# Patient Record
Sex: FEMALE | Race: WHITE | NOT HISPANIC OR LATINO | Employment: UNEMPLOYED | URBAN - METROPOLITAN AREA
[De-identification: names, ages, dates, MRNs, and addresses within clinical notes are randomized per-mention and may not be internally consistent; named-entity substitution may affect disease eponyms.]

---

## 2017-01-23 ENCOUNTER — HOSPITAL ENCOUNTER (EMERGENCY)
Facility: HOSPITAL | Age: 27
Discharge: HOME | End: 2017-01-23
Attending: EMERGENCY MEDICINE
Payer: MEDICAID

## 2017-01-23 VITALS
SYSTOLIC BLOOD PRESSURE: 134 MMHG | DIASTOLIC BLOOD PRESSURE: 80 MMHG | BODY MASS INDEX: 34.55 KG/M2 | TEMPERATURE: 98 F | HEIGHT: 66 IN | OXYGEN SATURATION: 97 % | WEIGHT: 215 LBS | RESPIRATION RATE: 20 BRPM | HEART RATE: 95 BPM

## 2017-01-23 DIAGNOSIS — K63.89 EPIPLOIC APPENDAGITIS: Primary | ICD-10-CM

## 2017-01-23 LAB
ALBUMIN SERPL BCP-MCNC: 3.7 G/DL
ALP SERPL-CCNC: 63 U/L
ALT SERPL W/O P-5'-P-CCNC: 35 U/L
ANION GAP SERPL CALC-SCNC: 9 MMOL/L
AST SERPL-CCNC: 20 U/L
B-HCG UR QL: NEGATIVE
BASOPHILS # BLD AUTO: 0 K/UL
BASOPHILS NFR BLD: 0.5 %
BILIRUB SERPL-MCNC: 0.6 MG/DL
BILIRUB UR QL STRIP: NEGATIVE
BUN SERPL-MCNC: 9 MG/DL
CALCIUM SERPL-MCNC: 9.1 MG/DL
CHLORIDE SERPL-SCNC: 109 MMOL/L
CLARITY UR: CLEAR
CO2 SERPL-SCNC: 20 MMOL/L
COLOR UR: YELLOW
CREAT SERPL-MCNC: 0.7 MG/DL
CTP QC/QA: YES
DIFFERENTIAL METHOD: NORMAL
EOSINOPHIL # BLD AUTO: 0 K/UL
EOSINOPHIL NFR BLD: 0.6 %
ERYTHROCYTE [DISTWIDTH] IN BLOOD BY AUTOMATED COUNT: 13.1 %
EST. GFR  (AFRICAN AMERICAN): >60 ML/MIN/1.73 M^2
EST. GFR  (NON AFRICAN AMERICAN): >60 ML/MIN/1.73 M^2
GLUCOSE SERPL-MCNC: 91 MG/DL
GLUCOSE UR QL STRIP: NEGATIVE
HCT VFR BLD AUTO: 38.6 %
HGB BLD-MCNC: 13.1 G/DL
HGB UR QL STRIP: NEGATIVE
KETONES UR QL STRIP: NEGATIVE
LEUKOCYTE ESTERASE UR QL STRIP: NEGATIVE
LIPASE SERPL-CCNC: 17 U/L
LYMPHOCYTES # BLD AUTO: 2.6 K/UL
LYMPHOCYTES NFR BLD: 33.9 %
MCH RBC QN AUTO: 27.9 PG
MCHC RBC AUTO-ENTMCNC: 33.8 %
MCV RBC AUTO: 83 FL
MONOCYTES # BLD AUTO: 0.5 K/UL
MONOCYTES NFR BLD: 6.2 %
NEUTROPHILS # BLD AUTO: 4.5 K/UL
NEUTROPHILS NFR BLD: 58.8 %
NITRITE UR QL STRIP: NEGATIVE
PH UR STRIP: 6 [PH] (ref 5–8)
PLATELET # BLD AUTO: 314 K/UL
PMV BLD AUTO: 9.3 FL
POTASSIUM SERPL-SCNC: 3.6 MMOL/L
PROT SERPL-MCNC: 6.6 G/DL
PROT UR QL STRIP: NEGATIVE
RBC # BLD AUTO: 4.68 M/UL
SODIUM SERPL-SCNC: 138 MMOL/L
SP GR UR STRIP: 1.02 (ref 1–1.03)
URN SPEC COLLECT METH UR: NORMAL
UROBILINOGEN UR STRIP-ACNC: NEGATIVE EU/DL
WBC # BLD AUTO: 7.6 K/UL

## 2017-01-23 PROCEDURE — 87086 URINE CULTURE/COLONY COUNT: CPT

## 2017-01-23 PROCEDURE — 25500020 PHARM REV CODE 255: Performed by: EMERGENCY MEDICINE

## 2017-01-23 PROCEDURE — 81003 URINALYSIS AUTO W/O SCOPE: CPT

## 2017-01-23 PROCEDURE — 83690 ASSAY OF LIPASE: CPT

## 2017-01-23 PROCEDURE — 25500020 PHARM REV CODE 255

## 2017-01-23 PROCEDURE — 81025 URINE PREGNANCY TEST: CPT | Performed by: EMERGENCY MEDICINE

## 2017-01-23 PROCEDURE — 36415 COLL VENOUS BLD VENIPUNCTURE: CPT

## 2017-01-23 PROCEDURE — 80053 COMPREHEN METABOLIC PANEL: CPT

## 2017-01-23 PROCEDURE — 85025 COMPLETE CBC W/AUTO DIFF WBC: CPT

## 2017-01-23 PROCEDURE — 99284 EMERGENCY DEPT VISIT MOD MDM: CPT | Mod: 25

## 2017-01-23 RX ORDER — NAPROXEN 500 MG/1
500 TABLET ORAL 2 TIMES DAILY WITH MEALS
Qty: 30 TABLET | Refills: 0 | Status: SHIPPED | OUTPATIENT
Start: 2017-01-23 | End: 2020-04-14

## 2017-01-23 RX ADMIN — IOHEXOL 100 ML: 350 INJECTION, SOLUTION INTRAVENOUS at 12:01

## 2017-01-23 RX ADMIN — IOHEXOL 30 ML: 350 INJECTION, SOLUTION INTRAVENOUS at 02:01

## 2017-01-23 NOTE — ED NOTES
Upon discharge, patient is AAOx4, no cardiac or respiratory complications. Follow up care and  Medications have been reviewed with patient and has been instructed to return to the ER if needed. Patient verbalized understanding and ambulated to the lobby without difficulty. MARY MITCHELL.

## 2017-01-23 NOTE — ED PROVIDER NOTES
Encounter Date: 2017    SCRIBE #1 NOTE: I, Odette Washington, am scribing for, and in the presence of, Dr. Boateng.       History     Chief Complaint   Patient presents with    Abdominal Pain     left lower abd. pain x 3 days     Review of patient's allergies indicates:   Allergen Reactions    Bactrim [sulfamethoxazole-trimethoprim]     Ciprofloxacin     Penicillins      HPI Comments:   2017 9:47 AM     Chief Complaint: Abdominal pain      The patient is a 26 y.o. female with anxiety who presents to the ED with an acute onset of constant LLQ abdominal pain. The symptoms began 2 days ago. The pain is described as a dull pain and is exacerbated with movement. She also endorses a non-productive cough. The patient is unsure if she has had prior similar symptoms to ovarian cysts in the past. She has a SHx including an appendectomy and a  section. The patient denies smoking tobacco, drinking alcohol, fever, SOB, swelling, chest pain, nausea, vomiting, diarrhea, constipation, dysuria, vaginal discharge, weakness, or any other symptoms at this time. Penicillin, Bactrim and Wellbutrin drug allergies noted.    The history is provided by the patient.     Past Medical History   Diagnosis Date    Anxiety     MRSA pneumonia      No past medical history pertinent negatives.  Past Surgical History   Procedure Laterality Date     section, low transverse      Appendectomy      Tonsillectomy      Bronchoscopy       Family History   Problem Relation Age of Onset    COPD Mother     Heart disease Brother      Social History   Substance Use Topics    Smoking status: Never Smoker    Smokeless tobacco: None    Alcohol use Yes      Comment: socially     Review of Systems   Constitutional: Negative for activity change, appetite change, chills, diaphoresis, fatigue and fever.   HENT: Negative for congestion, rhinorrhea, sore throat, trouble swallowing and voice change.    Respiratory: Positive for cough.  Negative for choking, chest tightness, shortness of breath, wheezing and stridor.    Cardiovascular: Negative for chest pain, palpitations and leg swelling.   Gastrointestinal: Positive for abdominal pain (LLQ). Negative for abdominal distention, blood in stool, constipation, diarrhea, nausea and vomiting.   Genitourinary: Negative for difficulty urinating, dysuria, flank pain, frequency and vaginal discharge.   Musculoskeletal: Negative for arthralgias, back pain, joint swelling, myalgias and neck pain.   Skin: Negative for color change and rash.   Neurological: Negative for dizziness, syncope, speech difficulty, weakness, numbness and headaches.   Hematological: Negative for adenopathy. Does not bruise/bleed easily.   All other systems reviewed and are negative.    Physical Exam   Initial Vitals   BP Pulse Resp Temp SpO2   01/23/17 0912 01/23/17 0912 01/23/17 0912 01/23/17 0912 01/23/17 0912   134/80 95 20 98.4 °F (36.9 °C) 97 %     Physical Exam    Nursing note and vitals reviewed.  Constitutional: She appears well-developed and well-nourished. She is not diaphoretic. No distress.   HENT:   Head: Normocephalic and atraumatic.   Mouth/Throat: Oropharynx is clear and moist.   Eyes: EOM are normal. Pupils are equal, round, and reactive to light.   Pupils are 3 mm round and reactive to light.   Neck: Neck supple.   Cardiovascular: Normal rate, regular rhythm and normal heart sounds. Exam reveals no gallop and no friction rub.    No murmur heard.  Pulses:       Radial pulses are 2+ on the right side, and 2+ on the left side.        Posterior tibial pulses are 2+ on the right side, and 2+ on the left side.   Capillary refill less than 2 seconds.    Pulmonary/Chest: Breath sounds normal. She has no wheezes. She has no rhonchi. She has no rales.   Abdominal: Soft. Bowel sounds are normal. She exhibits no distension. There is tenderness (to palpation) in the left lower quadrant. There is CVA tenderness (mild). There is no  rebound and no guarding.   Active bowel sounds. No palpable organomegaly.    Musculoskeletal: She exhibits no edema or tenderness.   No step-off, deformity, or bony tenderness of the spine. LE's full ROM.    Lymphadenopathy:     She has no cervical adenopathy.   Neurological: She is alert and oriented to person, place, and time. She has normal strength. No cranial nerve deficit or sensory deficit.   Negative straight leg raise, bilaterally.    Skin: Skin is warm and dry.   Psychiatric: She has a normal mood and affect. Her behavior is normal. Judgment and thought content normal.       ED Course   Procedures  Labs Reviewed   COMPREHENSIVE METABOLIC PANEL - Abnormal; Notable for the following:        Result Value    CO2 20 (*)     All other components within normal limits   CULTURE, URINE   CBC W/ AUTO DIFFERENTIAL   LIPASE   URINALYSIS   POCT URINE PREGNANCY     Imaging Results         CT Abdomen Pelvis With Contrast (Final result) Result time:  01/23/17 14:23:49    Final result by Lavonne Delarosa MD (01/23/17 14:23:49)    Impression:      1.  Acute, epiploic appendagitis in the left lower quadrant adjacent to the distal descending colon.    2.  Diffuse hepatic steatosis.      Electronically signed by: Lavonne Delarosa MD  Date:     01/23/17  Time:    14:23     Narrative:      Comparison:12/07/14    Technique: Axial 5-mm images are reviewed from above the diaphragm to the proximal femora following the administration of intravenous (100 ml Omnipaque 350) and oral contrast material.  Coronal and sagittal reformatted images are reviewed.    Findings:The lung bases are clear.    Diffuse hepatic steatosis is identified.  There is peripheral fatty sparing.  No acute gallbladder abnormality is seen.  The spleen is unremarkable.  The adrenal glands and pancreas are unremarkable.  No biliary dilatation is seen.    The kidneys enhance symmetrically.  No hydroureteronephrosis or nephroureterolithiasis is  identified.    The stomach is mildly well distended.  No dilated loops of small bowel are seen.  No signs of acute appendicitis noted.  The appendix is not clearly visualized and may be absent.  Positive contrast reaches the colon.  There is an inflamed epiploic appendage within the left lower quadrant on axial image 56, adjacent to the distal descending colon.    The uterus and adnexa are unremarkable for age.  There is a small follicle in the right ovary.  The urinary bladder is mildly well distended and otherwise unremarkable.    No abdominal or pelvic lymphadenopathy is seen.  The aorta is non-aneurysmal.  The principal vascular structures of the abdomen and pelvis appear patent.    No acute osseous abnormality is seen.            US Pelvis Comp with Transvag NON-OB (xpd (Final result) Result time:  01/23/17 11:02:29    Final result by Trey Owens MD (01/23/17 11:02:29)    Impression:     Unremarkable pelvic ultrasound.      Electronically signed by: Trey Owens MD  Date:     01/23/17  Time:    11:02     Narrative:    Sonographic evaluation of the pelvis was performed transabdominally and transvaginally    Comparison: 12/07/2014    Findings: The uterus measures 9.1 x 4.6 x 5.7 cm.  The individual stripe is normal in thickness at 2 mm.  A small nabothian cyst of the cervix is noted.    There is a normal in size and appearance with normal flow present.  There is no adnexal mass.    There is no free fluid.               X-Rays:   Independently Interpreted Readings:   Abdomen:   Abdomen and Pelvis CT with Contrast - No masses.  Normal vessels. Patient has findings consistent with epiploic appendagitis in the left lower quadrant consistant with her pain     Medical Decision Making:   History:   Old Medical Records: I decided to obtain old medical records.  Initial Assessment:   This is an emergent evaluation of an 26 y.o. presenting with LLQ abdominal pain.   Differential Diagnosis:   My differential  diagnosis includes, but not limited to, ovarian cysts, UTI, pyelonephritis, and kidney stone.   Independently Interpreted Test(s):   I have ordered and independently interpreted X-rays - see prior notes.  Clinical Tests:   Lab Tests: Reviewed and Ordered  Radiological Study: Reviewed and Ordered            Scribe Attestation:   Scribe #1: I performed the above scribed service and the documentation accurately describes the services I performed. I attest to the accuracy of the note.    Attending Attestation:           Physician Attestation for Scribe:  Physician Attestation Statement for Scribe #1: I, Dr. Boateng, reviewed documentation, as scribed by Odette Washington in my presence, and it is both accurate and complete.         Attending ED Notes:   Patient labs show no acute abnormality, patient CT shows epiploic appendicitis Echo she will be referred to surgery for elective management and will be started on Naprosyn for pain she is cautioned to return immediately to the ER for any worsening or any further concerns          ED Course     Clinical Impression:     1. Epiploic appendagitis        Disposition:   Disposition: Discharged  Condition: Stable       Blair Boateng MD  01/23/17 9795

## 2017-01-23 NOTE — ED AVS SNAPSHOT
[facility] [address] [place] [address]               Oneida Dela Cruz   2017  9:15 AM   ED    Description:  Female : 1990   Department:  [facility]           Your Care was Coordinated By:     Provider Role From To    Blair Boateng MD Attending Provider 17 0942 --      Reason for Visit     Abdominal Pain           Diagnoses this Visit        Comments    Epiploic appendagitis    -  Primary       ED Disposition     ED Disposition Condition Comment    Discharge             To Do List           Follow-up Information     Follow up with [facility].    Specialty:  Emergency Medicine    Why:  If symptoms worsen    Contact information:    [address] [place] [id number] [phone]        Follow up with Trey Rodrigues MD.    Specialties:  General Surgery, Surgery    Contact information:     [facility]  [address]  [facility] [address]  [phone]         These Medications        Disp Refills Start End    naproxen (NAPROSYN) 500 MG tablet 30 tablet 0 2017     Take 1 tablet (500 mg total) by mouth 2 (two) times daily with meals. - Oral      [facility]     [facility] On Call Nurse Care Line -  Assistance  Registered nurses in the [facility] On Call Center provide clinical advisement, health education, appointment booking, and other advisory services.  Call for this free service at [phone].             Medications           Message regarding Medications     Verify the changes and/or additions to your medication regime listed below are the same as discussed with your clinician today.  If any of these changes or additions are incorrect, please notify your healthcare provider.        START taking these NEW medications        Refills    naproxen (NAPROSYN) 500 MG tablet 0    Sig: Take 1 tablet (500 mg total) by mouth 2 (two) times daily with meals.    Class: Print    Route: Oral     "  These medications were administered today        Dose Freq    omnipaque 350 iohexol 350 mg iodine/mL      Notes to Pharmacy: Created by cabinet override    omnipaque 350 iohexol 30 mL 30 mL IMG once as needed    Sig: Take 30 mLs by mouth ONCE PRN for contrast.    Class: Normal    Route: Oral      STOP taking these medications     tramadol (ULTRAM) 50 mg tablet Take 1 tablet (50 mg total) by mouth every 6 (six) hours as needed.    ibuprofen (ADVIL,MOTRIN) 800 MG tablet Take 1 tablet (800 mg total) by mouth 3 (three) times daily as needed.    docusate sodium (COLACE) 100 MG capsule Take 1 capsule (100 mg total) by mouth 2 (two) times daily as needed for Constipation.           Verify that the below list of medications is an accurate representation of the medications you are currently taking.  If none reported, the list may be blank. If incorrect, please contact your healthcare provider. Carry this list with you in case of emergency.           Current Medications     cetirizine (ZYRTEC) 10 MG tablet Take 10 mg by mouth once daily.    naproxen (NAPROSYN) 500 MG tablet Take 1 tablet (500 mg total) by mouth 2 (two) times daily with meals.    sertraline (ZOLOFT) 100 MG tablet Take 100 mg by mouth once daily.           Clinical Reference Information           Your Vitals Were     BP Pulse Temp Resp Height Weight    134/80 95 98.4 °F (36.9 °C) (Oral) 20 5' 6" (1.676 m) 97.5 kg (215 lb)    Last Period SpO2 BMI          01/13/2017 97% 34.7 kg/m2        Allergies as of 1/23/2017        Reactions    Bactrim [Sulfamethoxazole-trimethoprim]     Ciprofloxacin     Penicillins       Immunizations Administered on Date of Encounter - 1/23/2017     None      ED Micro, Lab, POCT     Start Ordered       Status Ordering Provider    01/23/17 0959 01/23/17 0958  POCT urine pregnancy  Once      Final result     01/23/17 0959 01/23/17 0958  Urine culture **CANNOT BE ORDERED STAT**  Once      In process     01/23/17 0955 01/23/17 0954  CBC W/ " AUTO DIFFERENTIAL  Once      Final result     01/23/17 0955 01/23/17 0954  Comp. Metabolic Panel  STAT      Final result     01/23/17 0955 01/23/17 0954  Lipase  STAT      Final result     01/23/17 0955 01/23/17 0954  Urinalysis - Clean Catch  STAT      Final result       ED Imaging Orders     Start Ordered       Status Ordering Provider    01/23/17 1128 01/23/17 1128  CT Abdomen Pelvis With Contrast  1 time imaging      Final result     01/23/17 0957 01/23/17 0957  US Pelvis Comp with Transvag NON-OB (xpd  1 time imaging      Final result       Discharge References/Attachments     COLON, HOW IT WORKS (ENGLISH)    ABDOMINAL PAIN, ADULT (ENGLISH)    HERNIA (ADULT) (ENGLISH)      [facility] Sign-Up     Activating your [facility] account is as easy as 1-2-3!     1) Visit [email or web address], select Sign Up Now, enter this activation code and your date of birth, then select Next.  [email or web address]  Expires: 3/9/2017  2:55 PM      2) Create a username and password to use when you visit [facility] in the future and select a security question in case you lose your password and select Next.    3) Enter your e-mail address and click Sign Up!    Additional Information  If you have questions, please e-mail [email or web address] or call [phone] to talk to our [facility] staff. Remember, [facility] is NOT to be used for urgent needs. For medical emergencies, dial 911.          [facility] complies with applicable Federal civil rights laws and does not discriminate on the basis of race, color, national origin, age, disability, or sex.        Language Assistance Services     ATTENTION: Language assistance services are available, free of charge. Please call [phone].      ATENCIÓN: Si habla español, tiene a hylton disposición servicios gratuitos de asistencia lingüística. Llame al [phone].     CHÚ Ý: N?u b?n nói Ti?ng Vi?t, có các d?ch v? h? tr? ngôn ng? mi?n phí dành cho b?n. G?i s? [phone].

## 2017-01-24 LAB — BACTERIA UR CULT: NO GROWTH

## 2017-06-27 ENCOUNTER — OFFICE VISIT (OUTPATIENT)
Dept: ALLERGY | Facility: CLINIC | Age: 27
End: 2017-06-27
Payer: COMMERCIAL

## 2017-06-27 VITALS
DIASTOLIC BLOOD PRESSURE: 70 MMHG | WEIGHT: 212 LBS | SYSTOLIC BLOOD PRESSURE: 124 MMHG | BODY MASS INDEX: 34.07 KG/M2 | HEIGHT: 66 IN

## 2017-06-27 DIAGNOSIS — K21.9 LARYNGOPHARYNGEAL REFLUX (LPR): ICD-10-CM

## 2017-06-27 DIAGNOSIS — Z87.01 HISTORY OF PNEUMONIA: ICD-10-CM

## 2017-06-27 DIAGNOSIS — J31.0 CHRONIC RHINITIS: ICD-10-CM

## 2017-06-27 DIAGNOSIS — B99.9 RECURRENT INFECTIONS: Primary | ICD-10-CM

## 2017-06-27 PROCEDURE — 99244 OFF/OP CNSLTJ NEW/EST MOD 40: CPT | Mod: ,,, | Performed by: ALLERGY & IMMUNOLOGY

## 2017-06-27 RX ORDER — AZELASTINE 1 MG/ML
1 SPRAY, METERED NASAL 2 TIMES DAILY
Qty: 30 ML | Refills: 3 | Status: SHIPPED | OUTPATIENT
Start: 2017-06-27 | End: 2020-06-09

## 2017-06-27 RX ORDER — CLONAZEPAM 0.5 MG/1
0.5 TABLET ORAL
COMMUNITY
End: 2019-12-19 | Stop reason: SDUPTHER

## 2017-06-27 RX ORDER — TIZANIDINE 4 MG/1
4 TABLET ORAL
COMMUNITY
End: 2020-04-14

## 2017-06-27 RX ORDER — CEFDINIR 300 MG/1
300 CAPSULE ORAL 2 TIMES DAILY
COMMUNITY
End: 2017-09-01 | Stop reason: ALTCHOICE

## 2017-06-27 NOTE — PATIENT INSTRUCTIONS
Lifestyle Changes for Controlling GERD    When you have GERD, stomach acid feels as if its backing up toward your mouth. Whether or not you take medicine to control your GERD, your symptoms can often be improved with lifestyle changes. Talk to your healthcare provider about the following suggestions. These suggestions may help you get relief from your symptoms.  Raise your head  Reflux is more likely to strike when youre lying down flat, because stomach fluid can flow backward more easily. Raising the head of your bed 4 to 6 inches can help. To do this:  · Slide blocks or books under the legs at the head of your bed. Or, place a wedge under the mattress. Many [employer] can make a suitable wedge for you. The wedge should run from your waist to the top of your head.  · Dont just prop your head on several pillows. This increases pressure on your stomach. It can make GERD worse.  Watch your eating habits  Certain foods may increase the acid in your stomach or relax the lower esophageal sphincter. This makes GERD more likely. Its best to avoid the following if they cause you symptoms:  · Coffee, tea, and carbonated drinks (with and without caffeine)  · Fatty, fried, or spicy food  · Mint, chocolate, onions, and tomatoes  · Peppermint  · Any other foods that seem to irritate your stomach or cause you pain  Relieve the pressure  Tips include the following:  · Eat smaller meals, even if you have to eat more often.  · Dont lie down right after you eat. Wait a few hours for your stomach to empty.  · Avoid tight belts and tight-fitting clothes.  · Lose excess weight.  Tobacco and alcohol  Avoid smoking tobacco and drinking alcohol. They can make GERD symptoms worse.  Date Last Reviewed: 7/1/2016  © 1527-5912 [employer]. [address] [place] [address], [place] [address]. All rights reserved. This information is not intended as a substitute for professional medical care. Always follow your healthcare  professional's instructions.

## 2017-06-27 NOTE — PROGRESS NOTES
"Subjective:       Patient ID: Oneida Dela Cruz is a 27 y.o. female.    Chief Complaint: Allergic Rhinitis  (recurrent sinus infections) and Pneumonia (twice in the last 3 years, recurrent bronchitis)    HPI     Pt presents as a consult from Peace Gray NP.   Pt states since she was 19 yrs had recurrent infections.   SInus infections about 6 infections per year.   Abx: 2-3 or 3-4 days will help.   Steroid shots will help.   Sinus infections: headaches, pressure, green mucus , increased congestion, ear pain, lymph node swelling in posterior chain. No fever.   Pneumonia- 3 years ago MRSA and intubated. She was hospitalized for a while.   Ear infections: usually at the same time as sinustis.   No recurrent skin staph infections.   Sinus ct: none   No family history of PID.   Never had an immune eval prior.     She will stay sick longer than other contacts.     Pt has a baseline of chronic rhinitis. She takes a zyrtec daily. "If I don't take zyrtec daily I will get sick."     Review of Systems    General: neg unexpected weight changes, fevers, chills, night sweats, malaise  HEENT: see hpi, Neg eye pain, vision changes, ear drainage, nose bleeds, throat tightness, sores in the mouth  CV: Neg chest pain, palpitations, swelling  Resp: see hpi, neg shortness of breath, hemoptysis, cough  GI: see hpi, neg dysphagia, night abdominal pain, reflux, chronic diarrhea, chronic constipation  Derm: See Hpi, neg new rash, neg flushing  Mu/sk: Neg joint pain, joint swelling   Psych: Neg anxiety  neuro: neg chronic headaches, muscle weakness  Endo: neg heat/cold intolerance, chronic fatigue      Objective:       Vitals:    06/27/17 1039   BP: 124/70   Weight: 96.2 kg (212 lb)   Height: 5' 5.5" (1.664 m)       Physical Exam      General: no acute distress, well developed well nourished   HEENT:   Head:normocephalic atraumatic  Eyes: DARÍO, EOMI, Neg injection, scleral icterus, or conjunctival papillary hypertrophy.  Ears: tm clear " bilaterally, normal canal  Nose:2-3 + inferior turbinates pink, neg nasal polyps            Mucosa: dry             Septal irritation: distal bilaterally  OP: mucus membranes moist, - cobblestoning, - PND, neg erythema or lesions  Neck: supple, Full range of motion, neg lymphadenopathy  Chest: full respiratory excursion no abnormal chest abnormality  Resp: clear to ascultation bilaterally  CV: RRR, neg MRG, brisk capillary refill  Abdomen: BS+, non tender, non distended, neg hepatosplenomegaly.   Ext:  Neg clubbing, cyanosis, pitting edema  Skin: Neg rashes or lesions  Lymph: neg supraclavicular, axillary     Assessment:     Recurrent bacterial infection  - start immune eval  - quant ig titers and strep pneumo- pt getting done at [facility].     Chronic rhinitis  - start rhinitis action plan  1. rhinitis action plan given with INS technique shown today   2. start fluticasone 2 SEN BID x 2 weeks then titrate to lowest effective dose   3. start azelastine 1 SEN BID   4. start sinus rinse- instruction on use given   5. start sinus buster   6. skin prick testing- skin testing deferred today   7. F/u in 4-6 weeks    LPR- likely contributing to rhinitis symptoms.   Information given and reviewed

## 2017-06-28 NOTE — PROGRESS NOTES
I will have to look up her chart in [facility] to get the full lab scope. Cody doesn't report fully back to [facility] unfortunately....

## 2017-07-06 NOTE — PROGRESS NOTES
Will you call her and tell her that her strep pneumo protection is very low. She needs to get Dr. Arias's office to give her the pneumovax-23 vaccine and repeat titers 4-6 weeks later to see if she responded.

## 2017-08-08 DIAGNOSIS — A49.9 RECURRENT BACTERIAL INFECTION: Primary | ICD-10-CM

## 2017-09-01 ENCOUNTER — OFFICE VISIT (OUTPATIENT)
Dept: ALLERGY | Facility: CLINIC | Age: 27
End: 2017-09-01
Payer: MEDICAID

## 2017-09-01 VITALS
WEIGHT: 215.5 LBS | BODY MASS INDEX: 35.9 KG/M2 | HEIGHT: 65 IN | DIASTOLIC BLOOD PRESSURE: 80 MMHG | SYSTOLIC BLOOD PRESSURE: 118 MMHG

## 2017-09-01 DIAGNOSIS — K21.9 LARYNGOPHARYNGEAL REFLUX (LPR): ICD-10-CM

## 2017-09-01 DIAGNOSIS — J31.0 OTHER CHRONIC RHINITIS: ICD-10-CM

## 2017-09-01 DIAGNOSIS — Z87.01 HISTORY OF PNEUMONIA: ICD-10-CM

## 2017-09-01 DIAGNOSIS — B99.9 RECURRENT INFECTIONS: Primary | ICD-10-CM

## 2017-09-01 PROCEDURE — 99214 OFFICE O/P EST MOD 30 MIN: CPT | Mod: ,,, | Performed by: ALLERGY & IMMUNOLOGY

## 2017-09-01 PROCEDURE — 3008F BODY MASS INDEX DOCD: CPT | Mod: ,,, | Performed by: ALLERGY & IMMUNOLOGY

## 2017-09-01 RX ORDER — FLUTICASONE PROPIONATE 50 MCG
1 SPRAY, SUSPENSION (ML) NASAL DAILY
COMMUNITY
End: 2023-01-11

## 2017-09-01 NOTE — PROGRESS NOTES
"Subjective:       Patient ID: Oneida Dela Cruz is a 27 y.o. female.    Chief Complaint: Allergic Rhinitis  (no better since last visit, labs not done)    HPI     Pt presents for sinusitis management.  She has been ill twice since her last visit.   The first illness lasted for about a week. She felt that she was able to fight it "off."  She used elder berry and emergency C.   The second illness was about 2 weeks ago today. She did take cefdinir.   Her main symptoms- cough, congestion, malaise, fatigue, denies any fevers.   Currently: her symptoms are Rhinorrhea, and cough  Tx: flonase 2 SEN BID and azelastine 2 SEN BID, saline- BID- sinus buster helped for a few mins but then it seemed that her nose was congested afterwards.     Reflux: omeprazole- 20 mg- takes before breakfast. Doesn't fast 30 mins.     Her immune eval showed:    Quantitative IG  Normal     IgG- 984  IgM- 148  IgA- 220  Normal subclasses    Strep Pneumo  4/14 protective    Pneumovax at Dr. Arias's office. July 2017  Post strep titers: not done yet  Pt is willing to do today at [facility].     Review of Systems      General: neg unexpected weight changes, fevers, chills, night sweats, malaise  HEENT: see hpi, Neg eye pain, vision changes, ear drainage, nose bleeds, throat tightness, sores in the mouth  CV: Neg chest pain, palpitations, swelling  Resp: see hpi, neg shortness of breath, hemoptysis  GI: see hpi, neg dysphagia, night abdominal pain, chronic diarrhea, chronic constipation  Derm: See Hpi, neg new rash, neg flushing  Mu/sk: Neg joint pain, joint swelling   Psych: Neg anxiety  neuro: neg chronic headaches, muscle weakness  Endo: neg heat/cold intolerance, chronic fatigue      Objective:       Vitals:    09/01/17 1045   BP: 118/80   Weight: 97.8 kg (215 lb 8 oz)   Height: 5' 5" (1.651 m)       Physical Exam      General: no acute distress, well developed well nourished   HEENT:   Head:normocephalic atraumatic  Eyes: DARÍO, EOMI, Neg injection, " scleral icterus, or conjunctival papillary hypertrophy.  Ears: tm clear bilaterally, normal canal  Nose: 3+ inferior turbinates pink, neg nasal polyps            Mucosa: dry and red             Septal irritation: none   OP: mucus membranes moist, - cobblestoning, - PND, neg erythema or lesions  Neck: supple, Full range of motion, neg lymphadenopathy  Chest: full respiratory excursion no abnormal chest abnormality  Resp: clear to ascultation bilaterally  CV: RRR, neg MRG, brisk capillary refill  Abdomen: BS+, non tender, non distended.   Ext:  Neg clubbing, cyanosis, pitting edema  Skin: Neg rashes or lesions  Lymph: neg supraclavicular, axillary       Assessment:       1. Recurrent infections    2. History of pneumonia    3. Other chronic rhinitis    4. Laryngopharyngeal reflux (LPR)        Plan:       Recurrent infections  -     CT Maxillofacial Without Contrast  -     Humoral Immune Eval (Pneumo 14) with H. flu; Future; Expected date: 09/01/2017        -     currently normal quantitative titers, eval vaccine response today        -      In 6 months, repeat titer to evaluate for memory.      History of pneumonia  - continue immune eval  - consider amox prophylaxis.     Other chronic rhinitis  - continue fluticasone, azelastine, saline, sinus buster per rhinitis action plan.   - discussed using afrin and the proper use of it when she goes to [place] in December.   - CT sinus to evaluate for obstruction and need for surgical management.     Laryngopharyngeal reflux (LPR)  - continue omeprazole.   - may possibly be contributing to congestion and pnd.   - fast 30 mins prior to omeprazole ingestion.      Follow up in 3 months.     Allie Hicks M.D.  Allergy/Immunology  [facility]   [phone] phone  [phone] fax

## 2017-09-01 NOTE — LETTER
September 1, 2017        Cliff Arias MD  [address]  [address]  [facility] [address]             [facility]  [address] [place]  [address]  [place] [address]  Phone: [phone]  Fax: [phone]   Patient: Oneida Dela Cruz   MR Number: [id number]   YOB: 1990   Date of Visit: 9/1/2017       Dear Dr. Arias:    Thank you for referring Oneida Dela Cruz to me for evaluation. Attached you will find relevant portions of my assessment and plan of care.    If you have questions, please do not hesitate to call me. I look forward to following Oneida Dela Cruz along with you.    Sincerely,      Allie Hicks MD            CC  No Recipients    Enclosure

## 2017-09-07 ENCOUNTER — TELEPHONE (OUTPATIENT)
Dept: ALLERGY | Facility: CLINIC | Age: 27
End: 2017-09-07

## 2017-09-07 NOTE — TELEPHONE ENCOUNTER
----- Message from Allie Hicks MD sent at 9/7/2017  3:56 PM CDT -----  Will you tell her that her strep pneumo levels look fabulous?

## 2017-09-12 ENCOUNTER — TELEPHONE (OUTPATIENT)
Dept: ALLERGY | Facility: CLINIC | Age: 27
End: 2017-09-12

## 2017-09-12 NOTE — TELEPHONE ENCOUNTER
----- Message from Allie Hicks MD sent at 9/12/2017  9:05 AM CDT -----  Will you call and tell her that she has mild sinus disease however she may have some polyp vs other lesions in her right maxillary sinus. This may not account for the totality of her symptoms but may contribute towards any right sided fullness in the face. To me, It doesn't look as though antibiotics are needed at this time. For the polyp type lesions, ENT can better comment on whether or not surgical intervention is required.

## 2019-06-13 ENCOUNTER — HOSPITAL ENCOUNTER (EMERGENCY)
Facility: HOSPITAL | Age: 29
Discharge: HOME | End: 2019-06-13
Attending: EMERGENCY MEDICINE
Payer: COMMERCIAL

## 2019-06-13 VITALS
WEIGHT: 215.63 LBS | OXYGEN SATURATION: 97 % | TEMPERATURE: 98 F | HEART RATE: 96 BPM | SYSTOLIC BLOOD PRESSURE: 128 MMHG | HEIGHT: 65 IN | BODY MASS INDEX: 35.93 KG/M2 | DIASTOLIC BLOOD PRESSURE: 79 MMHG | RESPIRATION RATE: 18 BRPM

## 2019-06-13 DIAGNOSIS — J06.9 VIRAL URI WITH COUGH: ICD-10-CM

## 2019-06-13 DIAGNOSIS — R05.9 COUGH: Primary | ICD-10-CM

## 2019-06-13 LAB
B-HCG UR QL: NEGATIVE
CTP QC/QA: YES

## 2019-06-13 PROCEDURE — 99283 EMERGENCY DEPT VISIT LOW MDM: CPT | Mod: 25

## 2019-06-13 PROCEDURE — 81025 URINE PREGNANCY TEST: CPT | Performed by: PHYSICIAN ASSISTANT

## 2019-06-13 RX ORDER — CHOLECALCIFEROL (VITAMIN D3) 25 MCG
3000 TABLET ORAL DAILY
COMMUNITY
End: 2020-06-09

## 2019-06-13 RX ORDER — DOXYCYCLINE HYCLATE 100 MG
100 TABLET ORAL 2 TIMES DAILY
COMMUNITY
End: 2019-12-17

## 2019-06-13 NOTE — ED NOTES
C/o moist cough with productive green sputum and fever x3 days also c/o emesis after coughing and states that she has been wheezing. Even non labored respirations aware to notify nurse of needs or concerns

## 2019-06-13 NOTE — ED PROVIDER NOTES
Encounter Date: 2019       History     Chief Complaint   Patient presents with    Cough       Time seen by provider: 10:33 AM on 2019    Oneida Dela Cruz is a 29 y.o. female with a PMHx of MRSA pneumonia who presents to the ED with the onset of a green productive cough that began 3 days PTA and has been persistent since onset. Associated sx include post tussive emesis, wheezing, and a fever. The pt states that her doctor prescribed her Doxycycline for the sx 3 days ago which has not helped relieve the sx. She also admits to taking Flonase, Albuterol, and Mucinex. She denies having any congestion, runny nose, being a smoker or any other sx at this time. Past surgical hx includes an appendectomy and a tonsillectomy. Pt has drug allergies to Bactrim, Ciprofloxacin, and Penicillins.    The history is provided by the patient.     Review of patient's allergies indicates:   Allergen Reactions    Bactrim [sulfamethoxazole-trimethoprim]      Rash     Ciprofloxacin      Doesn't remember ? Rash     Penicillins Rash     Tiny raised bumps. Not urticarial- 10 years.      Past Medical History:   Diagnosis Date    Anxiety     MRSA pneumonia      Past Surgical History:   Procedure Laterality Date    APPENDECTOMY      BRONCHOSCOPY      BRONCHOSCOPY N/A 2014    [facility]    BRONCHOSCOPY N/A 2014    [facility] Lambert [facility] at [facility]     SECTION, LOW TRANSVERSE      TONSILLECTOMY       Family History   Problem Relation Age of Onset    COPD Mother     Asthma Mother     Heart disease Mother     Heart disease Brother     Heart disease Maternal Grandfather     Diabetes Paternal Grandmother     Heart disease Paternal Grandmother     Immunodeficiency Neg Hx     Eczema Neg Hx     Rhinitis Neg Hx      Social History     Tobacco Use    Smoking status: Never Smoker    Smokeless tobacco: Never Used   Substance Use Topics    Alcohol use: Yes     Comment:  "socially    Drug use: No     Review of Systems   Constitutional: Positive for fever. Negative for activity change, appetite change and chills.   HENT: Negative for congestion, rhinorrhea and sore throat.    Eyes: Negative for redness and visual disturbance.   Respiratory: Positive for cough and wheezing. Negative for chest tightness and shortness of breath.         Positive for post tussive emesis.   Cardiovascular: Negative for chest pain.   Gastrointestinal: Negative for abdominal pain, diarrhea, nausea and vomiting.   Genitourinary: Negative for dysuria and frequency.   Musculoskeletal: Negative for back pain, neck pain and neck stiffness.   Skin: Negative for rash.   Neurological: Negative for dizziness, syncope, numbness and headaches.       Physical Exam     Vitals:    06/13/19 1012 06/13/19 1053 06/13/19 1115   BP: 137/83 128/79    BP Location: Right arm Left arm    Patient Position: Sitting Sitting    Pulse: 109 107 96   Resp: 16 18    Temp: 98.5 °F (36.9 °C) 98.2 °F (36.8 °C)    TempSrc: Oral Oral    SpO2: 97% 97%    Weight: 97.8 kg (215 lb 9.8 oz)     Height: 5' 5" (1.651 m)         Physical Exam    Nursing note and vitals reviewed.  Constitutional: She appears well-developed and well-nourished. She is cooperative.  Non-toxic appearance. She does not have a sickly appearance.   HENT:   Head: Normocephalic and atraumatic.   Right Ear: External ear normal.   Left Ear: External ear normal.   Nose: Nose normal.   Mouth/Throat: Oropharynx is clear and moist.   Eyes: Conjunctivae and lids are normal. Pupils are equal, round, and reactive to light.   Neck: Normal range of motion and full passive range of motion without pain. Neck supple.   Cardiovascular: Normal rate, regular rhythm and normal heart sounds. Exam reveals no gallop and no friction rub.    No murmur heard.  Pulmonary/Chest: Breath sounds normal. She has no wheezes. She has no rhonchi. She has no rales.   Abdominal: Soft. Normal appearance. There is " no tenderness. There is no rigidity, no rebound and no guarding.   Skin: Skin is warm, dry and intact. No rash noted.         ED Course   Procedures  Labs Reviewed   POCT URINE PREGNANCY          Imaging Results          X-Ray Chest PA And Lateral (Final result)  Result time 06/13/19 10:56:02    Final result by Trey Owens MD (06/13/19 10:56:02)                 Impression:      No acute process.  No significant change.      Electronically signed by: Trey Owens MD  Date:    06/13/2019  Time:    10:56             Narrative:    EXAMINATION:  XR CHEST PA AND LATERAL    CLINICAL HISTORY:  Cough    TECHNIQUE:  PA and lateral views of the chest were performed.    COMPARISON:  07/27/2014    FINDINGS:  The cardiomediastinal silhouette is with normal limits.  There is no consolidation or pleural effusion.                                 Medical Decision Making:   History:   Old Medical Records: I decided to obtain old medical records.  Clinical Tests:   Lab Tests: Ordered and Reviewed  Radiological Study: Ordered and Reviewed       APC / Resident Notes:   Urgent evaluation of a 29 year old female with complaint of congestion, rhinorrhea, cough and subjective fever. No abdominal pain, nausea or vomiting.  Vital signs are stable.  Patient is afebrile.  Abdomen is soft and nontender.  There is no rebound, rigidity or distention.  I doubt intra-abdominal process.  Bilateral TMs with no erythema, retraction or perforation.  There is no mastoid tenderness.  There is no movement tenderness to bilateral ears.  No tonsillar swelling or exudate noted.  Uvula is midline.  No concern for ludwigs angina.  Workup is negative.  Suspect symptoms are secondary to viral illness.  Symptomatic treatment. Discussed results with patient. Return precautions given. Patient is to follow up with their primary care provider. Case was discussed with Dr. Salazar who has evaluated the patient and is in agreement with the plan of care.  All questions answered.          Scribe Attestation:   Scribe #1: I performed the above scribed service and the documentation accurately describes the services I performed. I attest to the accuracy of the note.    I, Lakshmi Banda PA-C, personally performed the services described in this documentation. All medical record entries made by the scribe were at my direction and in my presence.  I have reviewed the chart and agree that the record reflects my personal performance and is accurate and complete. Lakshmi Banda PA-C.  12:22 PM 06/13/2019          ED Course as of Jun 13 1223   Thu Jun 13, 2019   1056 CXR:  NAD. (my read)    [MR]      ED Course User Index  [MR] Roge Salazar MD     Clinical Impression:       ICD-10-CM ICD-9-CM   1. Cough R05 786.2   2. Viral URI with cough J06.9 465.9    B97.89          Disposition:   Disposition: Discharged  Condition: Stable                        Lakshmi Banda PA-C  06/13/19 1223

## 2019-06-13 NOTE — DISCHARGE INSTRUCTIONS
Continue your medication regimen as prescribed.  Follow up with your primary care provider.  For worsening symptoms, chest pain, shortness of breath, increased abdominal pain, high grade fever, stroke or stroke like symptoms, immediately go to the nearest Emergency Room or call 911 as soon as possible.

## 2019-11-14 ENCOUNTER — CLINICAL SUPPORT (OUTPATIENT)
Dept: FAMILY MEDICINE | Facility: CLINIC | Age: 29
End: 2019-11-14
Payer: COMMERCIAL

## 2019-11-14 VITALS — TEMPERATURE: 99 F

## 2019-11-14 DIAGNOSIS — Z23 FLU VACCINE NEED: Primary | ICD-10-CM

## 2019-11-14 PROCEDURE — 90471 FLU VACCINE - QUADRIVALENT (RECOMBINANT) PRESERVATIVE FREE: ICD-10-PCS | Mod: S$GLB,,, | Performed by: FAMILY MEDICINE

## 2019-11-14 PROCEDURE — 90682 RIV4 VACC RECOMBINANT DNA IM: CPT | Mod: S$GLB,,, | Performed by: FAMILY MEDICINE

## 2019-11-14 PROCEDURE — 90682 FLU VACCINE - QUADRIVALENT (RECOMBINANT) PRESERVATIVE FREE: ICD-10-PCS | Mod: S$GLB,,, | Performed by: FAMILY MEDICINE

## 2019-11-14 PROCEDURE — 90471 IMMUNIZATION ADMIN: CPT | Mod: S$GLB,,, | Performed by: FAMILY MEDICINE

## 2019-12-09 ENCOUNTER — TELEPHONE (OUTPATIENT)
Dept: FAMILY MEDICINE | Facility: CLINIC | Age: 29
End: 2019-12-09

## 2019-12-09 NOTE — TELEPHONE ENCOUNTER
----- Message from Earline Minor sent at 12/9/2019 10:11 AM CST -----  Patient Is requesting for a refill of KLONOPIN sent to [facility] in [facility] [phone]

## 2019-12-09 NOTE — TELEPHONE ENCOUNTER
LMOR letting pt know she would have to be seen before sending any refills because we have not seen her since March for a sick visit and have no sent this medication since 2018.

## 2019-12-17 ENCOUNTER — OFFICE VISIT (OUTPATIENT)
Dept: FAMILY MEDICINE | Facility: CLINIC | Age: 29
End: 2019-12-17
Payer: COMMERCIAL

## 2019-12-17 VITALS
WEIGHT: 228 LBS | HEIGHT: 65 IN | DIASTOLIC BLOOD PRESSURE: 86 MMHG | SYSTOLIC BLOOD PRESSURE: 122 MMHG | HEART RATE: 84 BPM | BODY MASS INDEX: 37.99 KG/M2

## 2019-12-17 DIAGNOSIS — N39.0 URINARY TRACT INFECTION WITHOUT HEMATURIA, SITE UNSPECIFIED: ICD-10-CM

## 2019-12-17 DIAGNOSIS — R30.0 DYSURIA: Primary | ICD-10-CM

## 2019-12-17 LAB
BILIRUB UR QL STRIP: NEGATIVE
GLUCOSE UR QL STRIP: NEGATIVE
KETONES UR QL STRIP: NEGATIVE
LEUKOCYTE ESTERASE UR QL STRIP: NEGATIVE
PH, POC UA: 6
POC BLOOD, URINE: NEGATIVE
POC NITRATES, URINE: NEGATIVE
PROT UR QL STRIP: NEGATIVE
SP GR UR STRIP: 1.02 (ref 1–1.03)
UROBILINOGEN UR STRIP-ACNC: 0.2 (ref 0.1–1.1)

## 2019-12-17 PROCEDURE — 99213 PR OFFICE/OUTPT VISIT, EST, LEVL III, 20-29 MIN: ICD-10-PCS | Mod: 25,S$GLB,, | Performed by: NURSE PRACTITIONER

## 2019-12-17 PROCEDURE — 3008F BODY MASS INDEX DOCD: CPT | Mod: S$GLB,,, | Performed by: NURSE PRACTITIONER

## 2019-12-17 PROCEDURE — 81003 POCT URINALYSIS, DIPSTICK, AUTOMATED, W/O SCOPE: ICD-10-PCS | Mod: QW,S$GLB,, | Performed by: NURSE PRACTITIONER

## 2019-12-17 PROCEDURE — 81003 URINALYSIS AUTO W/O SCOPE: CPT | Mod: QW,S$GLB,, | Performed by: NURSE PRACTITIONER

## 2019-12-17 PROCEDURE — 3008F PR BODY MASS INDEX (BMI) DOCUMENTED: ICD-10-PCS | Mod: S$GLB,,, | Performed by: NURSE PRACTITIONER

## 2019-12-17 PROCEDURE — 99213 OFFICE O/P EST LOW 20 MIN: CPT | Mod: 25,S$GLB,, | Performed by: NURSE PRACTITIONER

## 2019-12-17 RX ORDER — METFORMIN HYDROCHLORIDE 500 MG/1
1000 TABLET ORAL 2 TIMES DAILY WITH MEALS
Refills: 5 | COMMUNITY
Start: 2019-11-27 | End: 2020-06-09

## 2019-12-17 RX ORDER — NITROFURANTOIN 25; 75 MG/1; MG/1
100 CAPSULE ORAL 2 TIMES DAILY
Qty: 14 CAPSULE | Refills: 0 | Status: SHIPPED | OUTPATIENT
Start: 2019-12-17 | End: 2020-04-14

## 2019-12-17 NOTE — PROGRESS NOTES
SUBJECTIVE:    Patient ID: Oneida Ingram is a 29 y.o. female.    Chief Complaint: Abdominal Pain (x 2 days. no bottles brought ac) and Urinary Tract Infection    Presents with complaints of urinary discomfort.  Reports that symptoms started about 3 days ago.  Feels like symptoms are getting worse.  No fever.  No back pain.  Has not taken anything.  Is experiencing pressure and burning at the end of urination.  Patient does report a history of UTIs.      Office Visit on 2019   Component Date Value Ref Range Status    POC Blood, Urine 2019 Negative  Negative Final    POC Bilirubin, Urine 2019 Negative  Negative Final    POC Urobilinogen, Urine 2019 0.2  0.1 - 1.1 Final    POC Ketones, Urine 2019 Negative  Negative Final    POC Protein, Urine 2019 Negative  Negative Final    POC Nitrates, Urine 2019 Negative  Negative Final    POC Glucose, Urine 2019 Negative  Negative Final    pH, UA 2019 6.0   Final    POC Specific Gravity, Urine 2019 1.025  1.003 - 1.029 Final    POC Leukocytes, Urine 2019 Negative  Negative Final       Past Medical History:   Diagnosis Date    Anxiety     MRSA pneumonia      Past Surgical History:   Procedure Laterality Date    APPENDECTOMY      BRONCHOSCOPY       SECTION, LOW TRANSVERSE      TONSILLECTOMY       Family History   Problem Relation Age of Onset    COPD Mother     Asthma Mother     Heart disease Mother     Heart disease Brother     Heart disease Maternal Grandfather     Diabetes Paternal Grandmother     Heart disease Paternal Grandmother     Immunodeficiency Neg Hx     Eczema Neg Hx     Rhinitis Neg Hx        Marital Status:   Alcohol History:  reports that she drinks alcohol.  Tobacco History:  reports that she has never smoked. She has never used smokeless tobacco.  Drug History:  reports that she does not use drugs.    Review of patient's allergies indicates:   Allergen  Reactions    Azithromycin Hives    Bactrim [sulfamethoxazole-trimethoprim]      Rash     Ciprofloxacin      Doesn't remember ? Rash     Naltrexone-bupropion Nausea And Vomiting and Other (See Comments)     hands and face numbness    Penicillins Rash     Tiny raised bumps. Not urticarial- 10 years.        Current Outpatient Medications:     albuterol sulfate (PROAIR HFA INHL), Inhale into the lungs., Disp: , Rfl:     azelastine (ASTELIN) 137 mcg (0.1 %) nasal spray, 1 spray (137 mcg total) by Nasal route 2 (two) times daily., Disp: 30 mL, Rfl: 3    cetirizine (ZYRTEC) 10 MG tablet, Take 10 mg by mouth once daily., Disp: , Rfl:     clonazePAM (KLONOPIN) 0.5 MG tablet, Take 0.5 mg by mouth as needed for Anxiety., Disp: , Rfl:     fluticasone (FLONASE) 50 mcg/actuation nasal spray, 1 spray by Each Nare route once daily., Disp: , Rfl:     metFORMIN (GLUCOPHAGE) 500 MG tablet, TAKE ONE TABLET BY MOUTH DAILY FOR TWO WEEKS, then increase 1 TABLET TWICE DAILY FOR TWO WEEKS, then 2 TABLETS TWICE DAILY, Disp: , Rfl: 5    naproxen (NAPROSYN) 500 MG tablet, Take 1 tablet (500 mg total) by mouth 2 (two) times daily with meals., Disp: 30 tablet, Rfl: 0    nitrofurantoin, macrocrystal-monohydrate, (MACROBID) 100 MG capsule, Take 1 capsule (100 mg total) by mouth 2 (two) times daily., Disp: 14 capsule, Rfl: 0    sertraline (ZOLOFT) 100 MG tablet, Take 100 mg by mouth once daily., Disp: , Rfl:     tizanidine (ZANAFLEX) 4 MG tablet, Take 4 mg by mouth as needed (lower back pain)., Disp: , Rfl:     vitamin D (VITAMIN D3) 1000 units Tab, Take 3,000 Units by mouth once daily., Disp: , Rfl:     Review of Systems   Constitutional: Negative for chills and fever.   Respiratory: Negative for cough and shortness of breath.    Cardiovascular: Negative for chest pain and leg swelling.   Gastrointestinal: Negative for abdominal pain.   Genitourinary: Positive for difficulty urinating and urgency. Negative for flank pain and  "hematuria.   Musculoskeletal: Negative for back pain.          Objective:      Vitals:    12/17/19 1040   BP: 122/86   Pulse: 84   Weight: 103.4 kg (228 lb)   Height: 5' 5" (1.651 m)     Body mass index is 37.94 kg/m².  Physical Exam   Constitutional: She appears well-developed and well-nourished.   Cardiovascular: Normal rate, regular rhythm and normal heart sounds.   Pulmonary/Chest: Effort normal and breath sounds normal.   Abdominal: Soft. Bowel sounds are normal. There is tenderness in the periumbilical area. There is no CVA tenderness.         Assessment:       1. Dysuria    2. Urinary tract infection without hematuria, site unspecified         Plan:       Dysuria  -     POCT Urinalysis, Dipstick, Automated, W/O Scope  -     Urine Culture, Routine; Future; Expected date: 12/17/2019    Urinary tract infection without hematuria, site unspecified  Comments:  Start Macrobid.  Push fluids.  Will send for culture.    Other orders  -     nitrofurantoin, macrocrystal-monohydrate, (MACROBID) 100 MG capsule; Take 1 capsule (100 mg total) by mouth 2 (two) times daily.  Dispense: 14 capsule; Refill: 0      Follow up if symptoms worsen or fail to improve.            "

## 2019-12-19 LAB — BACTERIA UR CULT: NORMAL

## 2019-12-19 RX ORDER — CLONAZEPAM 0.5 MG/1
0.5 TABLET ORAL
Qty: 30 TABLET | Refills: 0 | Status: SHIPPED | OUTPATIENT
Start: 2019-12-19 | End: 2020-04-14 | Stop reason: SDUPTHER

## 2019-12-19 NOTE — TELEPHONE ENCOUNTER
----- Message from Luna Morris sent at 12/19/2019 12:04 PM CST -----  Klonopin   [facility]   Pt [phone]

## 2019-12-26 ENCOUNTER — TELEPHONE (OUTPATIENT)
Dept: FAMILY MEDICINE | Facility: CLINIC | Age: 29
End: 2019-12-26

## 2019-12-26 NOTE — TELEPHONE ENCOUNTER
----- Message from Peace Gray NP sent at 12/26/2019  2:37 PM CST -----  Urine culture is negative.

## 2020-01-24 ENCOUNTER — TELEPHONE (OUTPATIENT)
Dept: FAMILY MEDICINE | Facility: CLINIC | Age: 30
End: 2020-01-24

## 2020-01-24 ENCOUNTER — OFFICE VISIT (OUTPATIENT)
Dept: FAMILY MEDICINE | Facility: CLINIC | Age: 30
End: 2020-01-24
Payer: COMMERCIAL

## 2020-01-24 VITALS
TEMPERATURE: 99 F | HEIGHT: 65 IN | WEIGHT: 228 LBS | SYSTOLIC BLOOD PRESSURE: 122 MMHG | HEART RATE: 92 BPM | BODY MASS INDEX: 37.99 KG/M2 | DIASTOLIC BLOOD PRESSURE: 84 MMHG

## 2020-01-24 DIAGNOSIS — J02.9 PHARYNGITIS, UNSPECIFIED ETIOLOGY: Primary | ICD-10-CM

## 2020-01-24 DIAGNOSIS — R50.9 FEVER, UNSPECIFIED FEVER CAUSE: ICD-10-CM

## 2020-01-24 LAB
CTP QC/QA: YES
CTP QC/QA: YES
MOLECULAR STREP A: NEGATIVE
POC MOLECULAR INFLUENZA A AGN: NEGATIVE
POC MOLECULAR INFLUENZA B AGN: NEGATIVE

## 2020-01-24 PROCEDURE — 3008F PR BODY MASS INDEX (BMI) DOCUMENTED: ICD-10-PCS | Mod: S$GLB,,, | Performed by: NURSE PRACTITIONER

## 2020-01-24 PROCEDURE — 87651 POCT STREP A MOLECULAR: ICD-10-PCS | Mod: QW,,, | Performed by: NURSE PRACTITIONER

## 2020-01-24 PROCEDURE — 87651 STREP A DNA AMP PROBE: CPT | Mod: QW,,, | Performed by: NURSE PRACTITIONER

## 2020-01-24 PROCEDURE — 99213 PR OFFICE/OUTPT VISIT, EST, LEVL III, 20-29 MIN: ICD-10-PCS | Mod: 25,S$GLB,, | Performed by: NURSE PRACTITIONER

## 2020-01-24 PROCEDURE — 3008F BODY MASS INDEX DOCD: CPT | Mod: S$GLB,,, | Performed by: NURSE PRACTITIONER

## 2020-01-24 PROCEDURE — 87502 POCT INFLUENZA A/B MOLECULAR: ICD-10-PCS | Mod: QW,,, | Performed by: NURSE PRACTITIONER

## 2020-01-24 PROCEDURE — 99213 OFFICE O/P EST LOW 20 MIN: CPT | Mod: 25,S$GLB,, | Performed by: NURSE PRACTITIONER

## 2020-01-24 PROCEDURE — 87502 INFLUENZA DNA AMP PROBE: CPT | Mod: QW,,, | Performed by: NURSE PRACTITIONER

## 2020-01-24 RX ORDER — CEFUROXIME AXETIL 250 MG/1
250 TABLET ORAL EVERY 12 HOURS
Qty: 20 TABLET | Refills: 0 | Status: SHIPPED | OUTPATIENT
Start: 2020-01-24 | End: 2020-02-03

## 2020-01-24 NOTE — TELEPHONE ENCOUNTER
----- Message from Cory Pedraza sent at 1/24/2020  8:32 AM CST -----  Contact: Oneidacapo Ingram  Pt says she thinks she may have strep throat. Her throat is sore and she has fever, she would like to know can she be seen today?? Please give the pt a call back as soon as possible.   Pt# [phone]

## 2020-01-24 NOTE — PROGRESS NOTES
SUBJECTIVE:    Patient ID: Oneida Ingram is a 29 y.o. female.    Chief Complaint: Sore Throat (possible strep, went to  yesterday and they said they didn't see any puss pockets// SW) and Otalgia (since Wednesday, no bottles// SW)    Pt presents for c/o sore throat since Wednesday. Reports exudate in her throat as well as submandible gland swelling and tenderness. Went to  and was told she was strep negative and not treated. Started with fever yesterday evening. Tmax 102. Some nausea. Denies cough. Some headaches and fatigue. Painful swallowing.         Office Visit on 2020   Component Date Value Ref Range Status    POC Molecular Influenza A Ag 2020 Negative  Negative, Not Reported Final    POC Molecular Influenza B Ag 2020 Negative  Negative, Not Reported Final     Acceptable 2020 Yes   Final    Molecular Strep A, POC 2020 Negative  Negative Final     Acceptable 2020 Yes   Final   Office Visit on 2019   Component Date Value Ref Range Status    POC Blood, Urine 2019 Negative  Negative Final    POC Bilirubin, Urine 2019 Negative  Negative Final    POC Urobilinogen, Urine 2019 0.2  0.1 - 1.1 Final    POC Ketones, Urine 2019 Negative  Negative Final    POC Protein, Urine 2019 Negative  Negative Final    POC Nitrates, Urine 2019 Negative  Negative Final    POC Glucose, Urine 2019 Negative  Negative Final    pH, UA 2019 6.0   Final    POC Specific Gravity, Urine 2019 1.025  1.003 - 1.029 Final    POC Leukocytes, Urine 2019 Negative  Negative Final    Urine Culture, Routine 2019    Final       Past Medical History:   Diagnosis Date    Anxiety     MRSA pneumonia      Past Surgical History:   Procedure Laterality Date    APPENDECTOMY      BRONCHOSCOPY       SECTION, LOW TRANSVERSE      TONSILLECTOMY       Family History   Problem Relation Age of Onset     COPD Mother     Asthma Mother     Heart disease Mother     Heart disease Brother     Heart disease Maternal Grandfather     Diabetes Paternal Grandmother     Heart disease Paternal Grandmother     Immunodeficiency Neg Hx     Eczema Neg Hx     Rhinitis Neg Hx        Marital Status:   Alcohol History:  reports that she drinks alcohol.  Tobacco History:  reports that she has never smoked. She has never used smokeless tobacco.  Drug History:  reports that she does not use drugs.    Review of patient's allergies indicates:   Allergen Reactions    Azithromycin Hives    Bactrim [sulfamethoxazole-trimethoprim]      Rash     Ciprofloxacin      Doesn't remember ? Rash     Naltrexone-bupropion Nausea And Vomiting and Other (See Comments)     hands and face numbness    Penicillins Rash     Tiny raised bumps. Not urticarial- 10 years.        Current Outpatient Medications:     albuterol sulfate (PROAIR HFA INHL), Inhale into the lungs., Disp: , Rfl:     azelastine (ASTELIN) 137 mcg (0.1 %) nasal spray, 1 spray (137 mcg total) by Nasal route 2 (two) times daily., Disp: 30 mL, Rfl: 3    cefUROXime (CEFTIN) 250 MG tablet, Take 1 tablet (250 mg total) by mouth every 12 (twelve) hours. for 10 days, Disp: 20 tablet, Rfl: 0    cetirizine (ZYRTEC) 10 MG tablet, Take 10 mg by mouth once daily., Disp: , Rfl:     clonazePAM (KLONOPIN) 0.5 MG tablet, Take 1 tablet (0.5 mg total) by mouth as needed for Anxiety., Disp: 30 tablet, Rfl: 0    fluticasone (FLONASE) 50 mcg/actuation nasal spray, 1 spray by Each Nare route once daily., Disp: , Rfl:     metFORMIN (GLUCOPHAGE) 500 MG tablet, TAKE ONE TABLET BY MOUTH DAILY FOR TWO WEEKS, then increase 1 TABLET TWICE DAILY FOR TWO WEEKS, then 2 TABLETS TWICE DAILY, Disp: , Rfl: 5    naproxen (NAPROSYN) 500 MG tablet, Take 1 tablet (500 mg total) by mouth 2 (two) times daily with meals., Disp: 30 tablet, Rfl: 0    nitrofurantoin, macrocrystal-monohydrate, (MACROBID) 100 MG  "capsule, Take 1 capsule (100 mg total) by mouth 2 (two) times daily., Disp: 14 capsule, Rfl: 0    sertraline (ZOLOFT) 100 MG tablet, Take 100 mg by mouth once daily., Disp: , Rfl:     tizanidine (ZANAFLEX) 4 MG tablet, Take 4 mg by mouth as needed (lower back pain)., Disp: , Rfl:     vitamin D (VITAMIN D3) 1000 units Tab, Take 3,000 Units by mouth once daily., Disp: , Rfl:     Review of Systems   Constitutional: Positive for fatigue and fever.   HENT: Positive for ear pain, sore throat and trouble swallowing. Negative for congestion and sinus pain.    Respiratory: Negative for choking.    Cardiovascular: Negative for chest pain and palpitations.   Gastrointestinal: Positive for nausea. Negative for vomiting.   Genitourinary: Negative.    Musculoskeletal: Negative.    Neurological: Positive for headaches. Negative for dizziness and weakness.   Psychiatric/Behavioral: Negative.           Objective:      Vitals:    01/24/20 1122   BP: 122/84   Pulse: 92   Temp: 98.7 °F (37.1 °C)   Weight: 103.4 kg (228 lb)   Height: 5' 5" (1.651 m)     Body mass index is 37.94 kg/m².  Physical Exam   Constitutional: She appears well-developed and well-nourished.   HENT:   Head: Normocephalic.   Right Ear: External ear normal.   Left Ear: External ear normal.   Nose: Nose normal.   Mouth/Throat: Oropharyngeal exudate and posterior oropharyngeal erythema present.   Neck: Normal range of motion. Neck supple.   Cardiovascular: Normal rate, regular rhythm and normal heart sounds.   Pulmonary/Chest: Effort normal and breath sounds normal.   Abdominal: Soft.   Musculoskeletal: Normal range of motion.   Neurological: She is alert.   Skin: Skin is warm.   Psychiatric: She has a normal mood and affect.         Assessment:       1. Pharyngitis, unspecified etiology    2. Fever, unspecified fever cause         Plan:       Pharyngitis, unspecified etiology  -     POCT Influenza A/B Molecular  -     POCT Strep A, Molecular  -     cefUROXime " (CEFTIN) 250 MG tablet; Take 1 tablet (250 mg total) by mouth every 12 (twelve) hours. for 10 days  Dispense: 20 tablet; Refill: 0  - Strep a negative but given symptoms, will treat as strep pharyngitis. Ceftin as pt is allergic to penicillins.    Fever, unspecified fever cause  - tylenol and ibuprofen as needed for fever.  - Rest, fluids    Follow up if symptoms worsen or fail to improve.

## 2020-04-01 ENCOUNTER — TELEPHONE (OUTPATIENT)
Dept: FAMILY MEDICINE | Facility: CLINIC | Age: 30
End: 2020-04-01

## 2020-04-13 ENCOUNTER — TELEPHONE (OUTPATIENT)
Dept: FAMILY MEDICINE | Facility: CLINIC | Age: 30
End: 2020-04-13

## 2020-04-13 NOTE — TELEPHONE ENCOUNTER
----- Message from Cory Pedraza sent at 4/13/2020  3:48 PM CDT -----  Contact: Oneida Ingram  Pt would like to set up a VV this week. She says she needs medications refilled.   Pt# [phone]

## 2020-04-14 ENCOUNTER — TELEPHONE (OUTPATIENT)
Dept: FAMILY MEDICINE | Facility: CLINIC | Age: 30
End: 2020-04-14

## 2020-04-14 ENCOUNTER — OFFICE VISIT (OUTPATIENT)
Dept: FAMILY MEDICINE | Facility: CLINIC | Age: 30
End: 2020-04-14
Payer: COMMERCIAL

## 2020-04-14 DIAGNOSIS — F33.42 RECURRENT MAJOR DEPRESSIVE DISORDER, IN FULL REMISSION: Primary | ICD-10-CM

## 2020-04-14 DIAGNOSIS — F41.9 ANXIETY: ICD-10-CM

## 2020-04-14 DIAGNOSIS — E28.2 PCOS (POLYCYSTIC OVARIAN SYNDROME): ICD-10-CM

## 2020-04-14 DIAGNOSIS — J30.2 SEASONAL ALLERGIES: ICD-10-CM

## 2020-04-14 PROCEDURE — 99213 OFFICE O/P EST LOW 20 MIN: CPT | Mod: 95,,, | Performed by: NURSE PRACTITIONER

## 2020-04-14 PROCEDURE — 99213 PR OFFICE/OUTPT VISIT, EST, LEVL III, 20-29 MIN: ICD-10-PCS | Mod: 95,,, | Performed by: NURSE PRACTITIONER

## 2020-04-14 RX ORDER — CLONAZEPAM 0.5 MG/1
0.5 TABLET ORAL 2 TIMES DAILY PRN
Qty: 60 TABLET | Refills: 1 | Status: SHIPPED | OUTPATIENT
Start: 2020-04-14 | End: 2020-12-11 | Stop reason: SDUPTHER

## 2020-04-14 RX ORDER — SERTRALINE HYDROCHLORIDE 100 MG/1
100 TABLET, FILM COATED ORAL DAILY
Qty: 90 TABLET | Refills: 1 | Status: SHIPPED | OUTPATIENT
Start: 2020-04-14 | End: 2020-10-19 | Stop reason: SDUPTHER

## 2020-04-14 NOTE — PROGRESS NOTES
Subjective:        The chief complaint leading to consultation is: medication refill  The patient location is:  Home  Visit type: Virtual visit with synchronous audio/video or audio only  This was a video visit in lieu of in-person visit due to the coronavirus emergency. Patient acknowledged and consented to the video visit encounter.     Patient presents for routine maintenance and medication refill.  History of pneumonia, seasonal allergies, anxiety and depression, in PCOS.  Currently on metformin her OBGYN to help control PCOS. Also taking spearmint tea capsules to decrease testosterone.  Not currently on birth control.  Still taking Zoloft daily and feels it works well for her.  Taking Klonopin 1-2 times per week.  Takes Zyrtec daily for seasonal allergies.  Uses Flonase p.r.n..  States she has been well and has not dealt with any sicknesses.  She is due for lab work with OBGYN but it is currently on hold due to COVID.        Past Surgical History:   Procedure Laterality Date    APPENDECTOMY      BRONCHOSCOPY       SECTION, LOW TRANSVERSE      TONSILLECTOMY       Past Medical History:   Diagnosis Date    Anxiety     MRSA pneumonia      Family History   Problem Relation Age of Onset    COPD Mother     Asthma Mother     Heart disease Mother     Heart disease Brother     Heart disease Maternal Grandfather     Diabetes Paternal Grandmother     Heart disease Paternal Grandmother     Immunodeficiency Neg Hx     Eczema Neg Hx     Rhinitis Neg Hx         Social History:   Marital Status:   Alcohol History:  reports that she drinks alcohol.  Tobacco History:  reports that she has never smoked. She has never used smokeless tobacco.  Drug History:  reports that she does not use drugs.    Review of patient's allergies indicates:   Allergen Reactions    Azithromycin Hives    Bactrim [sulfamethoxazole-trimethoprim]      Rash     Ciprofloxacin      Doesn't remember ? Rash      Naltrexone-bupropion Nausea And Vomiting and Other (See Comments)     hands and face numbness    Penicillins Rash     Tiny raised bumps. Not urticarial- 10 years.        Current Outpatient Medications   Medication Sig Dispense Refill    cetirizine (ZYRTEC) 10 MG tablet Take 10 mg by mouth once daily.      clonazePAM (KLONOPIN) 0.5 MG tablet Take 1 tablet (0.5 mg total) by mouth 2 (two) times daily as needed for Anxiety. 60 tablet 1    fluticasone (FLONASE) 50 mcg/actuation nasal spray 1 spray by Each Nare route once daily.      metFORMIN (GLUCOPHAGE) 500 MG tablet Take 1,000 mg by mouth 2 (two) times daily with meals.   5    NON FORMULARY MEDICATION Take 1 capsule by mouth 2 (two) times daily. Spearmint tea capsule      sertraline (ZOLOFT) 100 MG tablet Take 1 tablet (100 mg total) by mouth once daily. 90 tablet 1    albuterol sulfate (PROAIR HFA INHL) Inhale into the lungs.      azelastine (ASTELIN) 137 mcg (0.1 %) nasal spray 1 spray (137 mcg total) by Nasal route 2 (two) times daily. 30 mL 3    vitamin D (VITAMIN D3) 1000 units Tab Take 3,000 Units by mouth once daily.       No current facility-administered medications for this visit.        Review of Systems   Constitutional: Negative.    HENT: Negative for congestion, ear pain and sinus pressure.    Respiratory: Negative for cough and chest tightness.    Cardiovascular: Negative for chest pain and palpitations.   Gastrointestinal: Negative for abdominal pain, nausea and vomiting.   Genitourinary: Negative for dysuria, frequency and urgency.   Musculoskeletal: Negative for back pain and myalgias.   Skin: Negative for rash.   Neurological: Negative for dizziness and headaches.   Psychiatric/Behavioral: Negative.          Objective:        Physical Exam:   Physical Exam   Constitutional: She is oriented to person, place, and time. She appears well-developed and well-nourished. No distress.   HENT:   Head: Normocephalic and atraumatic.   Neck: Normal  range of motion.   Pulmonary/Chest: No respiratory distress.   Neurological: She is alert and oriented to person, place, and time.   Psychiatric: She has a normal mood and affect. Her behavior is normal.            Assessment:       1. Recurrent major depressive disorder, in full remission    2. Anxiety    3. PCOS (polycystic ovarian syndrome)    4. Seasonal allergies      Plan:   Recurrent major depressive disorder, in full remission  -     sertraline (ZOLOFT) 100 MG tablet; Take 1 tablet (100 mg total) by mouth once daily.  Dispense: 90 tablet; Refill: 1    Anxiety  -     clonazePAM (KLONOPIN) 0.5 MG tablet; Take 1 tablet (0.5 mg total) by mouth 2 (two) times daily as needed for Anxiety.  Dispense: 60 tablet; Refill: 1    PCOS (polycystic ovarian syndrome)  - Managed per OBGYN. Due for lab work.    Seasonal allergies  - Managed with zyrtec    Pt doing well on current medication regimen. Will refill meds. Follow up in about 6 months for annual wellness exam and annual lab work.     Follow up in about 6 months (around 10/14/2020) for Annual Physical.    Total time spent with patient: 15 minutes    Each patient to whom he or she provides medical services by telemedicine is:  (1) informed of the relationship between the physician and patient and the respective role of any other health care provider with respect to management of the patient; and (2) notified that he or she may decline to receive medical services by telemedicine and may withdraw from such care at any time.    This note was created using [employer] voice recognition software that occasionally misinterprets phrases or words.

## 2020-05-18 ENCOUNTER — TELEPHONE (OUTPATIENT)
Dept: FAMILY MEDICINE | Facility: CLINIC | Age: 30
End: 2020-05-18

## 2020-05-18 DIAGNOSIS — M79.673 PAIN OF FOOT, UNSPECIFIED LATERALITY: Primary | ICD-10-CM

## 2020-05-18 DIAGNOSIS — R20.8 BURNING SENSATION OF FOOT: ICD-10-CM

## 2020-05-18 NOTE — TELEPHONE ENCOUNTER
Has she been diagnosed with plantar fasciitis in the past? I can place an order but I'd like to know her symptoms. Please also ask her is she has preference of where she would like to do PT.

## 2020-05-18 NOTE — TELEPHONE ENCOUNTER
----- Message from Deyanira Maria sent at 5/18/2020  1:53 PM CDT -----  Contact: [facility]   estefani Mcmahon calling about the attached pt no information left.  # [phone]

## 2020-05-18 NOTE — TELEPHONE ENCOUNTER
Patient contacted the below PT office wanting PT services for plantar fascitis. They need a referral in order to provide services for the pt.

## 2020-05-19 ENCOUNTER — PATIENT MESSAGE (OUTPATIENT)
Dept: ADMINISTRATIVE | Facility: OTHER | Age: 30
End: 2020-05-19

## 2020-05-19 NOTE — TELEPHONE ENCOUNTER
Well I can certainly place the referral and we can see if PT helps. If that does not help we could always send to Podiatry.

## 2020-05-20 ENCOUNTER — TELEPHONE (OUTPATIENT)
Dept: FAMILY MEDICINE | Facility: CLINIC | Age: 30
End: 2020-05-20

## 2020-05-20 ENCOUNTER — PATIENT MESSAGE (OUTPATIENT)
Dept: FAMILY MEDICINE | Facility: CLINIC | Age: 30
End: 2020-05-20

## 2020-05-20 NOTE — TELEPHONE ENCOUNTER
----- Message from Cory Pedraza sent at 5/20/2020  2:41 PM CDT -----  Contact: Oneida Ingram  Pt says that she has a rash that she needs to speak with the nurse about. She would like to schedule a VV this evening today please?? She says that she has been putting steroid cream on it but it's just not working.   Pt# [phone]

## 2020-05-21 ENCOUNTER — OFFICE VISIT (OUTPATIENT)
Dept: FAMILY MEDICINE | Facility: CLINIC | Age: 30
End: 2020-05-21
Payer: COMMERCIAL

## 2020-05-21 DIAGNOSIS — L28.2 PRURITIC RASH: Primary | ICD-10-CM
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
## 2020-05-21 PROCEDURE — 99212 PR OFFICE/OUTPT VISIT, EST, LEVL II, 10-19 MIN: ICD-10-PCS | Mod: 95,,, | Performed by: NURSE PRACTITIONER

## 2020-05-21 PROCEDURE — 99212 OFFICE O/P EST SF 10 MIN: CPT | Mod: 95,,, | Performed by: NURSE PRACTITIONER

## 2020-05-21 RX ORDER — METHYLPREDNISOLONE 4 MG/1
TABLET ORAL
Qty: 1 PACKAGE | Refills: 0 | Status: SHIPPED | OUTPATIENT
Start: 2020-05-21 | End: 2020-06-09

## 2020-05-21 RX ORDER — HYDROXYZINE HYDROCHLORIDE 25 MG/1
25 TABLET, FILM COATED ORAL 3 TIMES DAILY
Qty: 30 TABLET | Refills: 0 | Status: SHIPPED | OUTPATIENT
Start: 2020-05-21 | End: 2020-06-09

## 2020-05-21 NOTE — PROGRESS NOTES
Subjective:        The chief complaint leading to consultation is: rash  The patient location is:  Home  Visit type: Virtual visit with synchronous audio/video or audio only  This was a video visit in lieu of in-person visit due to the coronavirus emergency. Patient acknowledged and consented to the video visit encounter.     Pt presents with c/o a rash she states started about 2 days ago. First thought it was a bug bite as it felt like a single, pruritic bump. Has her daughter look and found that at was a handful of red bumps. No burning or pain, just itching. Does not recall touching any particular plants or chemicals. States she was outside washing windows a few nights ago. No fevers. Has been putting clobetasol on it without much change.    Rash   This is a new problem. The current episode started in the past 7 days. The problem is unchanged. The affected locations include theback. The rash is characterized by redness and itchiness. Pertinent negatives include no anorexia, congestion, cough, diarrhea, eye pain, facial edema, fatigue, fever, joint pain, nail changes, rhinorrhea, shortness of breath, sore throat or vomiting. Past treatments include antihistamine, moisturizer and topical steroids. The treatment provided mild relief. Her past medical history is significant for varicella. There is no history of allergies, asthma or eczema.       Past Surgical History:   Procedure Laterality Date    APPENDECTOMY      BRONCHOSCOPY       SECTION, LOW TRANSVERSE      TONSILLECTOMY       Past Medical History:   Diagnosis Date    Anxiety     MRSA pneumonia      Family History   Problem Relation Age of Onset    COPD Mother     Asthma Mother     Heart disease Mother     Heart disease Brother     Heart disease Maternal Grandfather     Diabetes Paternal Grandmother     Heart disease Paternal Grandmother     Immunodeficiency Neg Hx     Eczema Neg Hx     Rhinitis Neg Hx         Social History:    Marital Status:   Alcohol History:  reports that she drinks alcohol.  Tobacco History:  reports that she has never smoked. She has never used smokeless tobacco.  Drug History:  reports that she does not use drugs.    Review of patient's allergies indicates:   Allergen Reactions    Azithromycin Hives    Bactrim [sulfamethoxazole-trimethoprim]      Rash     Ciprofloxacin      Doesn't remember ? Rash     Naltrexone-bupropion Nausea And Vomiting and Other (See Comments)     hands and face numbness    Penicillins Rash     Tiny raised bumps. Not urticarial- 10 years.        Current Outpatient Medications   Medication Sig Dispense Refill    albuterol sulfate (PROAIR HFA INHL) Inhale into the lungs.      azelastine (ASTELIN) 137 mcg (0.1 %) nasal spray 1 spray (137 mcg total) by Nasal route 2 (two) times daily. 30 mL 3    cetirizine (ZYRTEC) 10 MG tablet Take 10 mg by mouth once daily.      clonazePAM (KLONOPIN) 0.5 MG tablet Take 1 tablet (0.5 mg total) by mouth 2 (two) times daily as needed for Anxiety. 60 tablet 1    fluticasone (FLONASE) 50 mcg/actuation nasal spray 1 spray by Each Nare route once daily.      hydroxyzine HCL (ATARAX) 25 MG tablet Take 1 tablet (25 mg total) by mouth 3 (three) times daily. 30 tablet 0    metFORMIN (GLUCOPHAGE) 500 MG tablet Take 1,000 mg by mouth 2 (two) times daily with meals.   5    methylPREDNISolone (MEDROL DOSEPACK) 4 mg tablet use as directed 1 Package 0    NON FORMULARY MEDICATION Take 1 capsule by mouth 2 (two) times daily. Spearmint tea capsule      sertraline (ZOLOFT) 100 MG tablet Take 1 tablet (100 mg total) by mouth once daily. 90 tablet 1    vitamin D (VITAMIN D3) 1000 units Tab Take 3,000 Units by mouth once daily.       No current facility-administered medications for this visit.        Review of Systems   Constitutional: Negative for fatigue and fever.   HENT: Negative for congestion, rhinorrhea and sore throat.    Eyes: Negative for pain.    Respiratory: Negative for cough and shortness of breath.    Gastrointestinal: Negative for anorexia, diarrhea and vomiting.   Musculoskeletal: Negative for joint pain.   Skin: Positive for rash. Negative for nail changes.   Neurological: Negative for headaches.         Objective:        Physical Exam:   Physical Exam   Constitutional: She is oriented to person, place, and time. She appears well-developed and well-nourished. No distress.   Pulmonary/Chest: No respiratory distress.   Neurological: She is alert and oriented to person, place, and time.   Skin: Rash noted.        Psychiatric: She has a normal mood and affect.            Assessment:       1. Pruritic rash    2. Mosquito bite, initial encounter      Plan:   Pruritic rash  -     methylPREDNISolone (MEDROL DOSEPACK) 4 mg tablet; use as directed  Dispense: 1 Package; Refill: 0  -     hydroxyzine HCL (ATARAX) 25 MG tablet; Take 1 tablet (25 mg total) by mouth 3 (three) times daily.  Dispense: 30 tablet; Refill: 0    Mosquito bite, initial encounter    Rash from heat and friction vs multiple bug bites. Difficult to distinguish via video conference. Will treat with steroids and atarax for itching.    Follow up if symptoms worsen or fail to improve.    Total time spent with patient: 12 minutes    Each patient to whom he or she provides medical services by telemedicine is:  (1) informed of the relationship between the physician and patient and the respective role of any other health care provider with respect to management of the patient; and (2) notified that he or she may decline to receive medical services by telemedicine and may withdraw from such care at any time.    This note was created using [employer] voice recognition software that occasionally misinterprets phrases or words.

## 2020-05-27 ENCOUNTER — PATIENT MESSAGE (OUTPATIENT)
Dept: FAMILY MEDICINE | Facility: CLINIC | Age: 30
End: 2020-05-27

## 2020-06-11 ENCOUNTER — HOSPITAL ENCOUNTER (OUTPATIENT)
Dept: RADIOLOGY | Facility: HOSPITAL | Age: 30
Discharge: HOME | End: 2020-06-11
Attending: NURSE PRACTITIONER
Payer: COMMERCIAL

## 2020-06-11 VITALS — WEIGHT: 229.06 LBS | BODY MASS INDEX: 36.81 KG/M2 | HEIGHT: 66 IN

## 2020-06-11 DIAGNOSIS — Z80.3 FAMILY HISTORY OF BREAST CANCER: ICD-10-CM

## 2020-06-11 DIAGNOSIS — N63.10 MASS OF BREAST, RIGHT: ICD-10-CM

## 2020-06-11 DIAGNOSIS — N63.10 BREAST MASS, RIGHT: ICD-10-CM

## 2020-06-11 PROCEDURE — 76642 US BREAST RIGHT LIMITED: ICD-10-PCS | Mod: 26,RT,, | Performed by: RADIOLOGY

## 2020-06-11 PROCEDURE — 77062 BREAST TOMOSYNTHESIS BI: CPT | Mod: 26,,, | Performed by: RADIOLOGY

## 2020-06-11 PROCEDURE — 76642 ULTRASOUND BREAST LIMITED: CPT | Mod: TC,RT

## 2020-06-11 PROCEDURE — 77062 MAMMO DIGITAL DIAGNOSTIC BILAT WITH TOMOSYNTHESIS_CAD: ICD-10-PCS | Mod: 26,,, | Performed by: RADIOLOGY

## 2020-06-11 PROCEDURE — 77066 DX MAMMO INCL CAD BI: CPT | Mod: 26,,, | Performed by: RADIOLOGY

## 2020-06-11 PROCEDURE — 76642 ULTRASOUND BREAST LIMITED: CPT | Mod: 26,RT,, | Performed by: RADIOLOGY

## 2020-06-11 PROCEDURE — 77066 DX MAMMO INCL CAD BI: CPT | Mod: TC

## 2020-06-11 PROCEDURE — 77066 MAMMO DIGITAL DIAGNOSTIC BILAT WITH TOMOSYNTHESIS_CAD: ICD-10-PCS | Mod: 26,,, | Performed by: RADIOLOGY

## 2020-07-21 ENCOUNTER — OFFICE VISIT (OUTPATIENT)
Dept: FAMILY MEDICINE | Facility: CLINIC | Age: 30
End: 2020-07-21
Payer: COMMERCIAL

## 2020-07-21 ENCOUNTER — PATIENT MESSAGE (OUTPATIENT)
Dept: FAMILY MEDICINE | Facility: CLINIC | Age: 30
End: 2020-07-21

## 2020-07-21 VITALS
HEIGHT: 67 IN | SYSTOLIC BLOOD PRESSURE: 118 MMHG | DIASTOLIC BLOOD PRESSURE: 84 MMHG | HEART RATE: 74 BPM | TEMPERATURE: 99 F | BODY MASS INDEX: 35 KG/M2 | WEIGHT: 223 LBS

## 2020-07-21 DIAGNOSIS — K52.9 GASTROENTERITIS: Primary | ICD-10-CM

## 2020-07-21 DIAGNOSIS — A09 DIARRHEA OF INFECTIOUS ORIGIN: ICD-10-CM

## 2020-07-21 DIAGNOSIS — R10.9 ABDOMINAL PAIN, UNSPECIFIED ABDOMINAL LOCATION: ICD-10-CM

## 2020-07-21 DIAGNOSIS — R10.12 LEFT UPPER QUADRANT PAIN: Primary | ICD-10-CM

## 2020-07-21 LAB
BILIRUB UR QL STRIP: NEGATIVE
GLUCOSE UR QL STRIP: NEGATIVE
KETONES UR QL STRIP: NEGATIVE
LEUKOCYTE ESTERASE UR QL STRIP: NEGATIVE
PH, POC UA: 5.5
POC BLOOD, URINE: POSITIVE
POC NITRATES, URINE: NEGATIVE
PROT UR QL STRIP: NEGATIVE
SP GR UR STRIP: 1.03 (ref 1–1.03)
UROBILINOGEN UR STRIP-ACNC: ABNORMAL (ref 0.1–1.1)

## 2020-07-21 PROCEDURE — 3008F BODY MASS INDEX DOCD: CPT | Mod: S$GLB,,, | Performed by: NURSE PRACTITIONER

## 2020-07-21 PROCEDURE — 81003 URINALYSIS AUTO W/O SCOPE: CPT | Mod: QW,S$GLB,, | Performed by: NURSE PRACTITIONER

## 2020-07-21 PROCEDURE — 81003 POCT URINALYSIS, DIPSTICK, AUTOMATED, W/O SCOPE: ICD-10-PCS | Mod: QW,S$GLB,, | Performed by: NURSE PRACTITIONER

## 2020-07-21 PROCEDURE — 99213 OFFICE O/P EST LOW 20 MIN: CPT | Mod: 25,S$GLB,, | Performed by: NURSE PRACTITIONER

## 2020-07-21 PROCEDURE — 99213 PR OFFICE/OUTPT VISIT, EST, LEVL III, 20-29 MIN: ICD-10-PCS | Mod: 25,S$GLB,, | Performed by: NURSE PRACTITIONER

## 2020-07-21 PROCEDURE — 3008F PR BODY MASS INDEX (BMI) DOCUMENTED: ICD-10-PCS | Mod: S$GLB,,, | Performed by: NURSE PRACTITIONER

## 2020-07-21 RX ORDER — METRONIDAZOLE 500 MG/1
500 TABLET ORAL EVERY 12 HOURS
Qty: 14 TABLET | Refills: 0 | Status: SHIPPED | OUTPATIENT
Start: 2020-07-21 | End: 2020-07-28

## 2020-07-21 NOTE — PROGRESS NOTES
SUBJECTIVE:    Patient ID: Oneida Ingram is a 30 y.o. female.    Chief Complaint: Abdominal Pain (started friday//brought bottles tb ) and Diarrhea    Pt presents with c/o abdominal pain and diarrhea that has been ongoing since Friday. Abdominal pain comes and goes, worse after eating. LUQ. Has diarrhea quickly after anything that she eats. Has been taking homeopathic supplements to help with PCOS for the last month but has stopped them since symptoms started. Also still taking probiotics. Has been having issues with menstrual cycle and has been experiencing menses for 3 weeks now. LUQ is not painful to touch but is tender. No fevers, no heart burn, no indigestion. Denies blood in stool. No dysuria or urinary urgency.      Office Visit on 07/21/2020   Component Date Value Ref Range Status    POC Blood, Urine 07/21/2020 Positive* Negative Final    POC Bilirubin, Urine 07/21/2020 Negative  Negative Final    POC Urobilinogen, Urine 07/21/2020 -  0.1 - 1.1 Final    POC Ketones, Urine 07/21/2020 Negative  Negative Final    POC Protein, Urine 07/21/2020 Negative  Negative Final    POC Nitrates, Urine 07/21/2020 Negative  Negative Final    POC Glucose, Urine 07/21/2020 Negative  Negative Final    pH, UA 07/21/2020 5.5   Final    POC Specific Gravity, Urine 07/21/2020 1.030* 1.003 - 1.029 Final    POC Leukocytes, Urine 07/21/2020 Negative  Negative Final   Orders Only on 06/29/2020   Component Date Value Ref Range Status    AM Cortisol 06/30/2020 6.6  6.2 - 19.4 ug/dL Final    LH 06/30/2020 12.8  mIU/mL Final    Follicle Stimulating Hormone 06/30/2020 4.9  mIU/mL Final    Estradiol 06/30/2020 44.3  pg/mL Final   Orders Only on 06/24/2020   Component Date Value Ref Range Status    Salivary Cortisol, MS 07/07/2020 0.022  ug/dL Final   Office Visit on 06/09/2020   Component Date Value Ref Range Status    DIAGNOSIS: 06/09/2020 Comment   Final    Specimen adequacy: 06/09/2020 Comment   Final    Clinician  Provided ICD10 2020 Comment   Final    Performed by: 2020 Comment   Final    QC reviewed by: 2020 Comment   Final    . 2020 .   Final    Note: 2020 Comment   Final    Test Methodology: 2020 Comment   Final    . 2020 Comment   Final   Office Visit on 2020   Component Date Value Ref Range Status    Glucose 2020 86  65 - 99 mg/dL Final    Insulin 2020 25.7* 2.6 - 24.9 uIU/mL Final    Testosterone 2020 53* 8 - 48 ng/dL Final    Testost., % Free+Weakly Bound 2020 21.3* 3.0 - 18.0 % Final    Testosterone, Free and Weakly Bound 2020 11.3* 0.0 - 9.5 ng/dL Final    Prolactin 2020 10.2  4.8 - 23.3 ng/mL Final    DHEA-SO4 2020 121.0  84.8 - 378.0 ug/dL Final    TSH 2020 3.150  0.450 - 4.500 uIU/mL Final    Thyroid Peroxidase Ab 2020 <9  0 - 34 IU/mL Final    Hemoglobin A1C 2020 5.3  4.8 - 5.6 % Final    Cholesterol 2020 167  100 - 199 mg/dL Final    Triglycerides 2020 152* 0 - 149 mg/dL Final    HDL 2020 36* >39 mg/dL Final    VLDL Cholesterol Pawel 2020 30  5 - 40 mg/dL Final    LDL Calculated 2020 101* 0 - 99 mg/dL Final    T4, Free 2020 1.02  0.82 - 1.77 ng/dL Final    T3, Free 2020 3.2  2.0 - 4.4 pg/mL Final   Office Visit on 2020   Component Date Value Ref Range Status    POC Molecular Influenza A Ag 2020 Negative  Negative, Not Reported Final    POC Molecular Influenza B Ag 2020 Negative  Negative, Not Reported Final     Acceptable 2020 Yes   Final    Molecular Strep A, POC 2020 Negative  Negative Final     Acceptable 2020 Yes   Final       Past Medical History:   Diagnosis Date    Anxiety     MRSA pneumonia      Past Surgical History:   Procedure Laterality Date    APPENDECTOMY      BRONCHOSCOPY       SECTION, LOW TRANSVERSE      TONSILLECTOMY       Family History    Problem Relation Age of Onset    COPD Mother     Asthma Mother     Heart disease Mother     Heart disease Brother     Heart disease Maternal Grandfather     Diabetes Paternal Grandmother     Heart disease Paternal Grandmother     Breast cancer Paternal Aunt     Breast cancer Paternal Aunt     Breast cancer Paternal Cousin     Immunodeficiency Neg Hx     Eczema Neg Hx     Rhinitis Neg Hx        Marital Status:   Alcohol History:  reports current alcohol use.  Tobacco History:  reports that she has never smoked. She has never used smokeless tobacco.  Drug History:  reports no history of drug use.    Review of patient's allergies indicates:   Allergen Reactions    Azithromycin Hives    Bactrim [sulfamethoxazole-trimethoprim]      Rash     Ciprofloxacin      Doesn't remember ? Rash     Naltrexone-bupropion Nausea And Vomiting and Other (See Comments)     hands and face numbness    Penicillins Rash     Tiny raised bumps. Not urticarial- 10 years.        Current Outpatient Medications:     albuterol sulfate (PROAIR HFA INHL), Inhale into the lungs., Disp: , Rfl:     APPLE CIDER VINEGAR ORAL, Take by mouth., Disp: , Rfl:     cetirizine (ZYRTEC) 10 MG tablet, Take 10 mg by mouth once daily., Disp: , Rfl:     clonazePAM (KLONOPIN) 0.5 MG tablet, Take 1 tablet (0.5 mg total) by mouth 2 (two) times daily as needed for Anxiety., Disp: 60 tablet, Rfl: 1    fluticasone (FLONASE) 50 mcg/actuation nasal spray, 1 spray by Each Nare route once daily., Disp: , Rfl:     sertraline (ZOLOFT) 100 MG tablet, Take 1 tablet (100 mg total) by mouth once daily., Disp: 90 tablet, Rfl: 1    thyroid, pork, (ARMOUR THYROID) 15 mg Tab, Take 1 tablet (15 mg total) by mouth before breakfast., Disp: 30 tablet, Rfl: 1    metroNIDAZOLE (FLAGYL) 500 MG tablet, Take 1 tablet (500 mg total) by mouth every 12 (twelve) hours. for 7 days, Disp: 14 tablet, Rfl: 0    Review of Systems   Constitutional: Negative.    HENT:  "Negative for congestion, ear pain, sinus pressure, sinus pain, tinnitus and trouble swallowing.    Eyes: Negative for pain and redness.   Respiratory: Negative for cough, chest tightness, shortness of breath and wheezing.    Cardiovascular: Negative for chest pain and palpitations.   Gastrointestinal: Positive for abdominal distention, abdominal pain, diarrhea and nausea. Negative for blood in stool and vomiting.   Genitourinary: Negative for dysuria, frequency and urgency.   Musculoskeletal: Negative for arthralgias, back pain and myalgias.   Skin: Negative for rash and wound.   Neurological: Negative for dizziness, weakness, light-headedness and headaches.   Psychiatric/Behavioral: Negative.           Objective:      Vitals:    07/21/20 1426   BP: 118/84   Pulse: 74   Temp: 98.7 °F (37.1 °C)   Weight: 101.2 kg (223 lb)   Height: 5' 7" (1.702 m)     Body mass index is 34.93 kg/m².  Physical Exam  Constitutional:       General: She is not in acute distress.     Appearance: Normal appearance.   HENT:      Head: Normocephalic and atraumatic.      Mouth/Throat:      Mouth: Mucous membranes are moist.   Neck:      Musculoskeletal: Normal range of motion.   Cardiovascular:      Rate and Rhythm: Normal rate and regular rhythm.      Heart sounds: Normal heart sounds.   Pulmonary:      Effort: Pulmonary effort is normal. No respiratory distress.      Breath sounds: Normal breath sounds.   Abdominal:      General: Bowel sounds are normal. There is no distension.      Tenderness: There is abdominal tenderness. There is no guarding.   Skin:     General: Skin is warm and dry.      Capillary Refill: Capillary refill takes less than 2 seconds.   Neurological:      Mental Status: She is alert and oriented to person, place, and time.   Psychiatric:         Mood and Affect: Mood normal.           Assessment:       1. Left upper quadrant pain    2. Diarrhea of infectious origin    3. Abdominal pain, unspecified abdominal location     "     Plan:       Left upper quadrant pain  -     CBC auto differential; Future; Expected date: 07/21/2020  -     Amylase; Future; Expected date: 07/21/2020  -     Lipase; Future; Expected date: 07/21/2020  -     Comprehensive metabolic panel; Future; Expected date: 07/21/2020  -     POCT Urinalysis, Dipstick, Automated, W/O Scope  - UA unremarkable. Will get lab work to rule out more serious concern.    Diarrhea of infectious origin  -     metroNIDAZOLE (FLAGYL) 500 MG tablet; Take 1 tablet (500 mg total) by mouth every 12 (twelve) hours. for 7 days  Dispense: 14 tablet; Refill: 0  - Will treat as infectious diarrhea at this point. Monitor symptoms. If no improvement, consider imaging and stool studies.    Abdominal pain, unspecified abdominal location      Follow up if symptoms worsen or fail to improve.

## 2020-07-22 ENCOUNTER — TELEPHONE (OUTPATIENT)
Dept: FAMILY MEDICINE | Facility: CLINIC | Age: 30
End: 2020-07-22

## 2020-07-22 LAB
ALBUMIN SERPL-MCNC: 4.8 G/DL (ref 3.6–5.1)
ALBUMIN/GLOB SERPL: 1.8 (CALC) (ref 1–2.5)
ALP SERPL-CCNC: 75 U/L (ref 31–125)
ALT SERPL-CCNC: 131 U/L (ref 6–29)
AMYLASE SERPL-CCNC: 15 U/L (ref 21–101)
AST SERPL-CCNC: 65 U/L (ref 10–30)
BASOPHILS # BLD AUTO: 57 CELLS/UL (ref 0–200)
BASOPHILS NFR BLD AUTO: 0.7 %
BILIRUB SERPL-MCNC: 0.7 MG/DL (ref 0.2–1.2)
BUN SERPL-MCNC: 12 MG/DL (ref 7–25)
BUN/CREAT SERPL: ABNORMAL (CALC) (ref 6–22)
CALCIUM SERPL-MCNC: 10 MG/DL (ref 8.6–10.2)
CHLORIDE SERPL-SCNC: 104 MMOL/L (ref 98–110)
CO2 SERPL-SCNC: 26 MMOL/L (ref 20–32)
CREAT SERPL-MCNC: 0.69 MG/DL (ref 0.5–1.1)
EOSINOPHIL # BLD AUTO: 510 CELLS/UL (ref 15–500)
EOSINOPHIL NFR BLD AUTO: 6.3 %
ERYTHROCYTE [DISTWIDTH] IN BLOOD BY AUTOMATED COUNT: 12.7 % (ref 11–15)
GFRSERPLBLD MDRD-ARVRAT: 118 ML/MIN/1.73M2
GLOBULIN SER CALC-MCNC: 2.7 G/DL (CALC) (ref 1.9–3.7)
GLUCOSE SERPL-MCNC: 81 MG/DL (ref 65–139)
HCT VFR BLD AUTO: 43 % (ref 35–45)
HGB BLD-MCNC: 14.5 G/DL (ref 11.7–15.5)
LIPASE SERPL-CCNC: 37 U/L (ref 7–60)
LYMPHOCYTES # BLD AUTO: 3046 CELLS/UL (ref 850–3900)
LYMPHOCYTES NFR BLD AUTO: 37.6 %
MCH RBC QN AUTO: 28.8 PG (ref 27–33)
MCHC RBC AUTO-ENTMCNC: 33.7 G/DL (ref 32–36)
MCV RBC AUTO: 85.5 FL (ref 80–100)
MONOCYTES # BLD AUTO: 583 CELLS/UL (ref 200–950)
MONOCYTES NFR BLD AUTO: 7.2 %
NEUTROPHILS # BLD AUTO: 3904 CELLS/UL (ref 1500–7800)
NEUTROPHILS NFR BLD AUTO: 48.2 %
PLATELET # BLD AUTO: 343 THOUSAND/UL (ref 140–400)
PMV BLD REES-ECKER: 11.3 FL (ref 7.5–12.5)
POTASSIUM SERPL-SCNC: 4.2 MMOL/L (ref 3.5–5.3)
PROT SERPL-MCNC: 7.5 G/DL (ref 6.1–8.1)
RBC # BLD AUTO: 5.03 MILLION/UL (ref 3.8–5.1)
SODIUM SERPL-SCNC: 140 MMOL/L (ref 135–146)
WBC # BLD AUTO: 8.1 THOUSAND/UL (ref 3.8–10.8)

## 2020-07-22 NOTE — TELEPHONE ENCOUNTER
----- Message from Elsa Bowers NP sent at 7/22/2020 11:47 AM CDT -----  White count is normal but liver enzymes are mildly elevated, which is not uncommon with acute gastroenteritis. We will monitor this for now and repeat in about 3 weeks.   Informed pt via portal, please set remind me to repeat CMP in 3 weeks.

## 2020-07-22 NOTE — PROGRESS NOTES
White count is normal but liver enzymes are mildly elevated, which is not uncommon with acute gastroenteritis. We will monitor this for now and repeat in about 3 weeks.   Informed pt via portal, please set remind me to repeat CMP in 3 weeks.

## 2020-07-22 NOTE — TELEPHONE ENCOUNTER
Spoke to patient with results verbatim per Elsa. Verbalized understanding on all. States that she hasn't seen it on her portal yet. Remind me created for 3 week lab.

## 2020-07-22 NOTE — TELEPHONE ENCOUNTER
I put all of the orders in. Please get appropriate sample cups from [facility] and bring them to the desk for her to .

## 2020-07-23 ENCOUNTER — TELEPHONE (OUTPATIENT)
Dept: FAMILY MEDICINE | Facility: CLINIC | Age: 30
End: 2020-07-23

## 2020-07-23 NOTE — TELEPHONE ENCOUNTER
Patient presents in office to  stool containers-question regarding this. Per Yani at [facility] when we looked at orders, the AFB Culture and Smear is a sputum test. I mentioned to Elsa what Yani said and Elsa said ok to cancel this test. Gm canceled.

## 2020-07-24 ENCOUNTER — PATIENT MESSAGE (OUTPATIENT)
Dept: FAMILY MEDICINE | Facility: CLINIC | Age: 30
End: 2020-07-24

## 2020-07-25 LAB — C DIFF GDH STL QL: NORMAL

## 2020-07-29 ENCOUNTER — PATIENT MESSAGE (OUTPATIENT)
Dept: FAMILY MEDICINE | Facility: CLINIC | Age: 30
End: 2020-07-29

## 2020-07-30 LAB
CAMPYLOBACTER STL CULT: NORMAL
E COLI SXT STL QL IA: NORMAL
O+P STL TRI STN: NORMAL
SALM + SHIG STL CULT: NORMAL
WBC STL QL MICRO: NORMAL

## 2020-08-06 ENCOUNTER — TELEPHONE (OUTPATIENT)
Dept: FAMILY MEDICINE | Facility: CLINIC | Age: 30
End: 2020-08-06

## 2020-08-06 DIAGNOSIS — R74.8 ABNORMAL LIVER ENZYMES: Primary | ICD-10-CM

## 2020-08-06 NOTE — TELEPHONE ENCOUNTER
----- Message from [facility], RT sent at 7/22/2020 11:59 AM CDT -----  Regarding: Lab due next week  Elsa Bowers, NP   7/22/2020 11:47 AM    White count is normal but liver enzymes are mildly elevated, which is not uncommon with acute gastroenteritis. We will monitor this for now and repeat in about 3 weeks.   Informed pt via portal, please set remind me to repeat CMP in 3 weeks.

## 2020-08-06 NOTE — TELEPHONE ENCOUNTER
Spoke to patient that fasting lab is due to recheck liver enzymes. Order pended. Patient will come in the next week or so.

## 2020-08-09 ENCOUNTER — HOSPITAL ENCOUNTER (EMERGENCY)
Facility: HOSPITAL | Age: 30
Discharge: HOME | End: 2020-08-09
Attending: EMERGENCY MEDICINE
Payer: COMMERCIAL

## 2020-08-09 VITALS
RESPIRATION RATE: 16 BRPM | TEMPERATURE: 99 F | OXYGEN SATURATION: 97 % | DIASTOLIC BLOOD PRESSURE: 73 MMHG | HEART RATE: 81 BPM | SYSTOLIC BLOOD PRESSURE: 127 MMHG | BODY MASS INDEX: 35.36 KG/M2 | WEIGHT: 220 LBS | HEIGHT: 66 IN

## 2020-08-09 DIAGNOSIS — N93.8 DYSFUNCTIONAL UTERINE BLEEDING: Primary | ICD-10-CM

## 2020-08-09 DIAGNOSIS — N85.00 ENDOMETRIAL HYPERPLASIA: ICD-10-CM

## 2020-08-09 LAB
ABO + RH BLD: NORMAL
ALBUMIN SERPL BCP-MCNC: 4.3 G/DL (ref 3.5–5.2)
ALP SERPL-CCNC: 70 U/L (ref 55–135)
ALT SERPL W/O P-5'-P-CCNC: 79 U/L (ref 10–44)
ANION GAP SERPL CALC-SCNC: 8 MMOL/L (ref 8–16)
AST SERPL-CCNC: 34 U/L (ref 10–40)
B-HCG UR QL: NEGATIVE
BACTERIA #/AREA URNS HPF: NEGATIVE /HPF
BASOPHILS # BLD AUTO: 0.04 K/UL (ref 0–0.2)
BASOPHILS NFR BLD: 0.5 % (ref 0–1.9)
BILIRUB SERPL-MCNC: 1.2 MG/DL (ref 0.1–1)
BILIRUB UR QL STRIP: NEGATIVE
BLD GP AB SCN CELLS X3 SERPL QL: NORMAL
BUN SERPL-MCNC: 11 MG/DL (ref 6–20)
CALCIUM SERPL-MCNC: 8.6 MG/DL (ref 8.7–10.5)
CHLORIDE SERPL-SCNC: 109 MMOL/L (ref 95–110)
CLARITY UR: CLEAR
CO2 SERPL-SCNC: 21 MMOL/L (ref 23–29)
COLOR UR: YELLOW
CREAT SERPL-MCNC: 0.6 MG/DL (ref 0.5–1.4)
CTP QC/QA: YES
DIFFERENTIAL METHOD: NORMAL
EOSINOPHIL # BLD AUTO: 0.3 K/UL (ref 0–0.5)
EOSINOPHIL NFR BLD: 3.2 % (ref 0–8)
ERYTHROCYTE [DISTWIDTH] IN BLOOD BY AUTOMATED COUNT: 12.5 % (ref 11.5–14.5)
EST. GFR  (AFRICAN AMERICAN): >60 ML/MIN/1.73 M^2
EST. GFR  (NON AFRICAN AMERICAN): >60 ML/MIN/1.73 M^2
GLUCOSE SERPL-MCNC: 107 MG/DL (ref 70–110)
GLUCOSE UR QL STRIP: NEGATIVE
HCT VFR BLD AUTO: 39 % (ref 37–48.5)
HGB BLD-MCNC: 13.4 G/DL (ref 12–16)
HGB UR QL STRIP: ABNORMAL
HYALINE CASTS #/AREA URNS LPF: 5 /LPF
IMM GRANULOCYTES # BLD AUTO: 0.02 K/UL (ref 0–0.04)
IMM GRANULOCYTES NFR BLD AUTO: 0.2 % (ref 0–0.5)
KETONES UR QL STRIP: NEGATIVE
LEUKOCYTE ESTERASE UR QL STRIP: NEGATIVE
LYMPHOCYTES # BLD AUTO: 2.5 K/UL (ref 1–4.8)
LYMPHOCYTES NFR BLD: 28.9 % (ref 18–48)
MCH RBC QN AUTO: 29.1 PG (ref 27–31)
MCHC RBC AUTO-ENTMCNC: 34.4 G/DL (ref 32–36)
MCV RBC AUTO: 85 FL (ref 82–98)
MICROSCOPIC COMMENT: ABNORMAL
MONOCYTES # BLD AUTO: 0.7 K/UL (ref 0.3–1)
MONOCYTES NFR BLD: 8.5 % (ref 4–15)
NEUTROPHILS # BLD AUTO: 5.1 K/UL (ref 1.8–7.7)
NEUTROPHILS NFR BLD: 58.7 % (ref 38–73)
NITRITE UR QL STRIP: NEGATIVE
NRBC BLD-RTO: 0 /100 WBC
PH UR STRIP: 5 [PH] (ref 5–8)
PLATELET # BLD AUTO: 341 K/UL (ref 150–350)
PMV BLD AUTO: 11.4 FL (ref 9.2–12.9)
POTASSIUM SERPL-SCNC: 3.5 MMOL/L (ref 3.5–5.1)
PROT SERPL-MCNC: 6.8 G/DL (ref 6–8.4)
PROT UR QL STRIP: NEGATIVE
RBC # BLD AUTO: 4.61 M/UL (ref 4–5.4)
RBC #/AREA URNS HPF: >100 /HPF (ref 0–4)
SODIUM SERPL-SCNC: 138 MMOL/L (ref 136–145)
SP GR UR STRIP: 1.02 (ref 1–1.03)
SPECIMEN SOURCE: NORMAL
SQUAMOUS #/AREA URNS HPF: 1 /HPF
T VAGINALIS GENITAL QL WET PREP: NORMAL
URN SPEC COLLECT METH UR: ABNORMAL
UROBILINOGEN UR STRIP-ACNC: NEGATIVE EU/DL
WBC # BLD AUTO: 8.7 K/UL (ref 3.9–12.7)
WBC #/AREA URNS HPF: 1 /HPF (ref 0–5)
YEAST GENITAL QL WET PREP: NORMAL

## 2020-08-09 PROCEDURE — 87491 CHLMYD TRACH DNA AMP PROBE: CPT

## 2020-08-09 PROCEDURE — 81025 URINE PREGNANCY TEST: CPT | Performed by: EMERGENCY MEDICINE

## 2020-08-09 PROCEDURE — 87081 CULTURE SCREEN ONLY: CPT

## 2020-08-09 PROCEDURE — 96360 HYDRATION IV INFUSION INIT: CPT

## 2020-08-09 PROCEDURE — 80053 COMPREHEN METABOLIC PANEL: CPT

## 2020-08-09 PROCEDURE — 81001 URINALYSIS AUTO W/SCOPE: CPT

## 2020-08-09 PROCEDURE — 36415 COLL VENOUS BLD VENIPUNCTURE: CPT

## 2020-08-09 PROCEDURE — 99284 EMERGENCY DEPT VISIT MOD MDM: CPT | Mod: 25

## 2020-08-09 PROCEDURE — 85025 COMPLETE CBC W/AUTO DIFF WBC: CPT

## 2020-08-09 PROCEDURE — 87210 SMEAR WET MOUNT SALINE/INK: CPT

## 2020-08-09 PROCEDURE — 87205 SMEAR GRAM STAIN: CPT

## 2020-08-09 PROCEDURE — 25000003 PHARM REV CODE 250: Performed by: EMERGENCY MEDICINE

## 2020-08-09 PROCEDURE — 86901 BLOOD TYPING SEROLOGIC RH(D): CPT

## 2020-08-09 RX ORDER — IBUPROFEN 200 MG
800 TABLET ORAL ONCE AS NEEDED
Status: ON HOLD | COMMUNITY
End: 2020-08-12 | Stop reason: HOSPADM

## 2020-08-09 RX ORDER — HYDROXYZINE HYDROCHLORIDE 25 MG/1
25 TABLET, FILM COATED ORAL ONCE AS NEEDED
COMMUNITY
End: 2020-10-19

## 2020-08-09 RX ADMIN — SODIUM CHLORIDE 1000 ML: 0.9 INJECTION, SOLUTION INTRAVENOUS at 05:08

## 2020-08-09 NOTE — FIRST PROVIDER EVALUATION
"Medical screening exam completed.  I have conducted a focused provider triage encounter, findings are as follows:    Brief history of present illness:  Vaginal bleeding and pelvic pain seen fro same a few weeks ago at [facility] had a positive urine pregnancy test at home however at [facility] had a negative urine pregnancy test and a negative serum pregnancy test.  Patient states she continues to have vaginal bleeding she was seen by her gyn doctor and placed on medications stop the bleeding however is not helped.    Vitals:    08/09/20 1634   BP: 136/89   BP Location: Left arm   Patient Position: Sitting   Pulse: 89   Resp: 16   Temp: 98.9 °F (37.2 °C)   TempSrc: Oral   SpO2: 98%   Weight: 99.8 kg (220 lb)   Height: 5' 6" (1.676 m)       Pertinent physical exam:  Vag bleeding with clots and pain    Brief workup plan:  ua labs     Preliminary workup initiated; this workup will be continued and followed by the physician or advanced practice provider that is assigned to the patient when roomed.  "

## 2020-08-09 NOTE — DISCHARGE INSTRUCTIONS
Please follow-up with your gyn doctor tomorrow for further evaluation definitive care as instructed  Return to the emergency department if you developed weakness, you feel like you may pass out, bleeding increases, pain increases, or any concerns

## 2020-08-09 NOTE — ED PROVIDER NOTES
Encounter Date: 2020       History     Chief Complaint   Patient presents with    Vaginal Bleeding     ONSET      30-year-old well-appearing female presents emergency department reports that she has been having some vaginal bleeding that started approximately .  Patient was seen for the same complaint at [facility] because at the time of her bleeding she had a positive urine pregnancy test at home.  At [facility] she was evaluated for this abnormal uterine bleeding she had a negative urine pregnancy test and a negative serum pregnancy test she was discharged home she followed up with Ms. Ansari her NP at her GYNs office.  The patient was placed on lysteda Friday however she reports she is continuing to have vaginal bleeding and some mild cramping.  Patient denies any shortness of breath or dizziness.        Review of patient's allergies indicates:   Allergen Reactions    Azithromycin Hives    Bactrim [sulfamethoxazole-trimethoprim]      Rash     Ciprofloxacin      Doesn't remember ? Rash     Naltrexone-bupropion Nausea And Vomiting and Other (See Comments)     hands and face numbness    Penicillins Rash     Tiny raised bumps. Not urticarial- 10 years.      Past Medical History:   Diagnosis Date    Anxiety     MRSA pneumonia      Past Surgical History:   Procedure Laterality Date    APPENDECTOMY      BRONCHOSCOPY       SECTION, LOW TRANSVERSE      TONSILLECTOMY       Family History   Problem Relation Age of Onset    COPD Mother     Asthma Mother     Heart disease Mother     Heart disease Brother     Heart disease Maternal Grandfather     Diabetes Paternal Grandmother     Heart disease Paternal Grandmother     Breast cancer Paternal Aunt     Breast cancer Paternal Aunt     Breast cancer Paternal Cousin     Immunodeficiency Neg Hx     Eczema Neg Hx     Rhinitis Neg Hx      Social History     Tobacco Use    Smoking status: Never Smoker     Smokeless tobacco: Never Used   Substance Use Topics    Alcohol use: Yes     Comment: socially    Drug use: No     Review of Systems   Constitutional: Negative.    Respiratory: Negative.    Cardiovascular: Negative.    Genitourinary: Positive for pelvic pain and vaginal bleeding.   Musculoskeletal: Negative.    Skin: Negative.    Allergic/Immunologic: Negative.    Neurological: Negative.    Hematological: Negative.    Psychiatric/Behavioral: Negative.    All other systems reviewed and are negative.      Physical Exam     Initial Vitals [08/09/20 1634]   BP Pulse Resp Temp SpO2   136/89 89 16 98.9 °F (37.2 °C) 98 %      MAP       --         Physical Exam    Nursing note and vitals reviewed.  Constitutional: She appears well-developed and well-nourished.   HENT:   Head: Normocephalic.   Right Ear: External ear normal.   Left Ear: External ear normal.   Eyes: Conjunctivae and EOM are normal. Pupils are equal, round, and reactive to light.   Neck: Normal range of motion.   Cardiovascular: Normal rate, regular rhythm, normal heart sounds and intact distal pulses.   Pulmonary/Chest: Breath sounds normal.   Abdominal: Soft. Bowel sounds are normal.   Genitourinary:    Vagina normal.      Pelvic exam was performed with patient prone.   Cervix exhibits no motion tenderness and no friability. Right adnexum displays no mass, no tenderness and no fullness. Left adnexum displays no mass, no tenderness and no fullness.    Genitourinary Comments: Patient is noted to have blood in her vaginal vault however do not appreciate copious amounts of blood from her cervical os.  There are no clots noted.     Musculoskeletal: Normal range of motion.   Neurological: She is alert and oriented to person, place, and time. She has normal strength. GCS score is 15. GCS eye subscore is 4. GCS verbal subscore is 5. GCS motor subscore is 6.   Skin: Skin is warm. Capillary refill takes less than 2 seconds.   Psychiatric: She has a normal mood and  affect. Her behavior is normal. Judgment and thought content normal.         ED Course   Procedures  Labs Reviewed   URINALYSIS, REFLEX TO URINE CULTURE - Abnormal; Notable for the following components:       Result Value    Occult Blood UA 3+ (*)     All other components within normal limits    Narrative:     Specimen Source->Urine   COMPREHENSIVE METABOLIC PANEL - Abnormal; Notable for the following components:    CO2 21 (*)     Calcium 8.6 (*)     Total Bilirubin 1.2 (*)     ALT 79 (*)     All other components within normal limits   URINALYSIS MICROSCOPIC - Abnormal; Notable for the following components:    RBC, UA >100 (*)     Hyaline Casts, UA 5 (*)     All other components within normal limits    Narrative:     Specimen Source->Urine   C. TRACHOMATIS/N. GONORRHOEAE BY AMP DNA   CULTURE, GONOCOCCUS   CBC W/ AUTO DIFFERENTIAL   VAGINAL SCREEN   POCT URINE PREGNANCY   TYPE & SCREEN          Imaging Results          US Pelvis Comp with Transvag NON-OB (xpd) (In process)  Result time 08/09/20 17:39:14   Procedure changed from US Pelvis Complete Non OB                  Medical Decision Making:   Initial Assessment:   Vaginal bleeding   Differential Diagnosis:   Ruptured cyst , dysfunctional uterine bleeding.  ED Management:  30-year-old well-appearing female presents to the emergency department she has had intermittent vaginal bleeding for several weeks she has been seen at [facility] for the same thing and her local gyn office she is currently on Lysteda  which is a anti febrile indicate agent.  She reports she continues to have vaginal bleeding.  The patient is hemodynamically stable her her vital signs are normal, her H&H is stable.  On vaginal exam she has blood noted to her vaginal vault no large amount of bleeding or clots noted from her cervical os.  Patient has no adnexal or uterine tenderness.  Or UPT here is negative.  Patient had a pelvic ultrasound which revealed endometrial hyperplasia.  I  did  the patient that she should follow-up with her OBGYN they may consider a short trial of hormone pills to slow down uterine bleeding.  I also instructed her she should be evaluated for possible biopsy secondary to endometrial hyperplasia.  Patient was given detailed return precautions                                 Clinical Impression:       ICD-10-CM ICD-9-CM   1. Dysfunctional uterine bleeding  N93.8 626.8   2. Endometrial hyperplasia  N85.00 621.30                                Kiana Holman, PADMINI  08/09/20 5117

## 2020-08-10 PROBLEM — N93.9 ABNORMAL UTERINE BLEEDING: Status: ACTIVE | Noted: 2020-08-10

## 2020-08-10 NOTE — H&P (VIEW-ONLY)
Subjective:       Patient ID: Oneida Ingram is a 30 y.o. female.    Chief Complaint:  Endometrial Biopsy      History of Present Illness  30 year old obese  female patient presents today with a prolonged episode of menorrhagia. Patient's LMP prior to this was in 2020. Patient has a history of PCOS. Patient desires pregnancy. Patient has been taking supplements to help regulate her cycle and increase chances of fertility. Patient was seen a few weeks ago after an episode of heavy bleeding and clotting and had an ultrasound which revealed a thickened endometrium of 16mm. Patient then said the bleeding subsided for a few days-a week, and then began again . Patient was prescribed Lysteda and went on vacation this weekend. On the way home patient stopped at [facility]. Patient had an ultrasound and labs. Patient's endometrium was noted to be 13mm yesterday. Patient states the Lysteda did not help the bleeding at all. Patient's ultrasound today reveals an endometrium of 7mm with a questionable endometrial polyp. Patient desires definitive treatment of this as she is attempting pregnancy.   Patient has a history of PCOS.       Past Medical History:   Diagnosis Date    Anxiety     MRSA pneumonia      Past Surgical History:   Procedure Laterality Date    APPENDECTOMY      BRONCHOSCOPY       SECTION, LOW TRANSVERSE      TONSILLECTOMY       Social History     Tobacco Use    Smoking status: Never Smoker    Smokeless tobacco: Never Used   Substance Use Topics    Alcohol use: Yes     Comment: socially    Drug use: No          Current Outpatient Medications:     albuterol sulfate (PROAIR HFA INHL), Inhale 1 puff into the lungs every 4 to 6 hours as needed. , Disp: , Rfl:     cetirizine (ZYRTEC) 10 MG tablet, Take 10 mg by mouth once daily., Disp: , Rfl:     clonazePAM (KLONOPIN) 0.5 MG tablet, Take 1 tablet (0.5 mg total) by mouth 2 (two) times daily as needed for Anxiety.,  Disp: 60 tablet, Rfl: 1    fluticasone (FLONASE) 50 mcg/actuation nasal spray, 1 spray by Each Nare route once daily., Disp: , Rfl:     HYDROcodone-acetaminophen (NORCO) 5-325 mg per tablet, Take 1 tablet by mouth every 8 (eight) hours as needed for Pain., Disp: 10 tablet, Rfl: 0    hydrOXYzine HCL (ATARAX) 25 MG tablet, Take 25 mg by mouth once as needed., Disp: , Rfl:     ibuprofen (ADVIL,MOTRIN) 200 MG tablet, Take 800 mg by mouth once as needed for Pain., Disp: , Rfl:     progesterone (PROMETRIUM) 100 MG capsule, Place 1 capsule (100 mg total) vaginally nightly., Disp: 14 capsule, Rfl: 11    sertraline (ZOLOFT) 100 MG tablet, Take 1 tablet (100 mg total) by mouth once daily., Disp: 90 tablet, Rfl: 1    thyroid, pork, (ARMOUR THYROID) 15 mg Tab, Take 1 tablet (15 mg total) by mouth before breakfast., Disp: 30 tablet, Rfl: 1    tranexamic acid (LYSTEDA) 650 mg tablet, Take 2 tablets (1,300 mg total) by mouth 3 (three) times daily., Disp: 30 tablet, Rfl: 1  No current facility-administered medications for this visit.    Review of patient's allergies indicates:   Allergen Reactions    Azithromycin Hives    Bactrim [sulfamethoxazole-trimethoprim]      Rash     Ciprofloxacin      Doesn't remember ? Rash     Naltrexone-bupropion Nausea And Vomiting and Other (See Comments)     hands and face numbness    Penicillins Rash     Tiny raised bumps. Not urticarial- 10 years.           Review of Systems  See HPI      Review of Systems   Constitutional: Negative for chills, fever and weight loss.   HENT: Negative for ear pain, hearing loss and tinnitus.    Eyes: Negative for blurred vision, double vision and photophobia.   Respiratory: Negative for cough, hemoptysis and sputum production.    Cardiovascular: Negative for chest pain, palpitations and orthopnea.   Gastrointestinal: Negative for heartburn, nausea and vomiting.   Genitourinary: Negative for dysuria, frequency, hematuria and urgency.        + pelvic  "pain; dysmenorrhea; menorrhagia    Musculoskeletal: Negative for back pain, myalgias and neck pain.   Skin: Negative for itching and rash.   Neurological: Negative for dizziness, tingling and headaches.   Endo/Heme/Allergies: Negative for environmental allergies and polydipsia. Does not bruise/bleed easily.        PCOS    Psychiatric/Behavioral: Negative for depression, substance abuse and suicidal ideas.        Objective:    /76 (BP Location: Right arm, Patient Position: Sitting)   Temp 99.1 °F (37.3 °C) (Temporal)   Ht 5' 6" (1.676 m)   Wt 101.2 kg (223 lb)   LMP 07/13/2020 (Exact Date) Comment: pt started menstrual on 6/30 pt currently still on cycle  BMI 35.99 kg/m²    Physical Exam   Constitutional: She is oriented to person, place, and time and well-developed, well-nourished, and in no distress.   HENT:   Head: Normocephalic and atraumatic.   Eyes: Pupils are equal, round, and reactive to light. Conjunctivae and EOM are normal.   Neck: Normal range of motion. Neck supple.   Cardiovascular: Normal rate, regular rhythm, normal heart sounds and intact distal pulses.   Pulmonary/Chest: Effort normal and breath sounds normal.   Abdominal: Soft. Bowel sounds are normal.   Genitourinary:    Vagina, cervix, uterus, right adnexa and left adnexa normal.      Genitourinary Comments: Vaginal bleeding      Musculoskeletal: Normal range of motion.   Neurological: She is alert and oriented to person, place, and time. She has normal reflexes. Gait normal. GCS score is 15.   Skin: Skin is warm and dry.   Psychiatric: Mood, memory, affect and judgment normal.   Nursing note and vitals reviewed.        Lab Results   Component Value Date    WBC 8.70 08/09/2020    HGB 13.4 08/09/2020    HCT 39.0 08/09/2020    MCV 85 08/09/2020     08/09/2020       CMP  Sodium   Date Value Ref Range Status   08/09/2020 138 136 - 145 mmol/L Final     Potassium   Date Value Ref Range Status   08/09/2020 3.5 3.5 - 5.1 mmol/L Final "     Chloride   Date Value Ref Range Status   08/09/2020 109 95 - 110 mmol/L Final     CO2   Date Value Ref Range Status   08/09/2020 21 (L) 23 - 29 mmol/L Final     Glucose   Date Value Ref Range Status   08/09/2020 107 70 - 110 mg/dL Final     BUN, Bld   Date Value Ref Range Status   08/09/2020 11 6 - 20 mg/dL Final     Creatinine   Date Value Ref Range Status   08/09/2020 0.6 0.5 - 1.4 mg/dL Final     Calcium   Date Value Ref Range Status   08/09/2020 8.6 (L) 8.7 - 10.5 mg/dL Final     Total Protein   Date Value Ref Range Status   08/09/2020 6.8 6.0 - 8.4 g/dL Final     Albumin   Date Value Ref Range Status   08/09/2020 4.3 3.5 - 5.2 g/dL Final     Total Bilirubin   Date Value Ref Range Status   08/09/2020 1.2 (H) 0.1 - 1.0 mg/dL Final     Comment:     For infants and newborns, interpretation of results should be based  on gestational age, weight and in agreement with clinical  observations.  Premature Infant recommended reference ranges:  Up to 24 hours.............<8.0 mg/dL  Up to 48 hours............<12.0 mg/dL  3-5 days..................<15.0 mg/dL  6-29 days.................<15.0 mg/dL       Alkaline Phosphatase   Date Value Ref Range Status   08/09/2020 70 55 - 135 U/L Final     AST   Date Value Ref Range Status   08/09/2020 34 10 - 40 U/L Final     ALT   Date Value Ref Range Status   08/09/2020 79 (H) 10 - 44 U/L Final     Anion Gap   Date Value Ref Range Status   08/09/2020 8 8 - 16 mmol/L Final     eGFR if    Date Value Ref Range Status   08/09/2020 >60.0 >60 mL/min/1.73 m^2 Final     eGFR if non    Date Value Ref Range Status   08/09/2020 >60.0 >60 mL/min/1.73 m^2 Final     Comment:     Calculation used to obtain the estimated glomerular filtration  rate (eGFR) is the CKD-EPI equation.        US Pelvis Comp with Transvag NON-OB (xpd)  PROCEDURE:  US PELVIS COMP WITH TRANSVAG NON-OB (XPD)  dated  8/9/2020  5:22 PM    CLINICAL HISTORY:   Female 30 years of age.   pelvic  pain  . Heavy  bleeding.    TECHNIQUE:  Transvaginal and transabdominal Sonographic evaluation of  the pelvis was performed.    PREVIOUS STUDIES:  None Available    FINDINGS:    Transvaginal: Uterus measures 9.4 cm x 5.2 cm x 3.9 cm. Visualization  of the uterus and of the adnexa is omitted on transvaginal scanning  due to large amount of bowel gas.    Transabdominal: Uterus measures 9.8 cm x 4.5 cm on sagittal scanning,  by 4.5 cm in width. The endometrium is smooth. The double layer  endometrial stripe thickness is 13 mm. There is no uterine mass. There  is no free fluid. Left ovary is not visualized. Right ovary is normal  and measures 4 cm x 1.8 cm x 4.6 cm.    IMPRESSION:    Normal uterus. Normal right ovary. Nonvisualization of the left ovary.    Electronically Signed by Alla Andrews on 8/9/2020 6:50 PM       Assessment:        1. Abnormal uterine bleeding    2. 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 PCOS (polycystic ovarian syndrome)    4. Menorrhagia with irregular cycle    5. Depression, unspecified depression type    6. Dysmenorrhea    7. Obesity (BMI 35.0-39.9 without comorbidity)    8. Preop examination               Plan:      Abnormal uterine bleeding  -     Case Request Operating Room: DILATION AND CURETTAGE, UTERUS, HYSTEROSCOPY, POLYPECTOMY, UTERUS, HYSTEROSCOPIC  -     US OB/GYN In Clinic Procedure (Non Viewpoint)- Today  -     POCT URINE PREGNANCY  -     POCT RAPID DRUG SCREEN    Homozygous MTHFR mutation [id number]    PCOS (polycystic ovarian syndrome)    Menorrhagia with irregular cycle    Depression, unspecified depression type    Dysmenorrhea  -     HYDROcodone-acetaminophen (NORCO) 5-325 mg per tablet; Take 1 tablet by mouth every 8 (eight) hours as needed for Pain.  Dispense: 10 tablet; Refill: 0  -     POCT RAPID DRUG SCREEN    Obesity (BMI 35.0-39.9 without comorbidity)    Preop examination  -     CBC auto differential; Future; Expected date: 08/10/2020  -     ABO/Rh; Future; Expected date:  08/10/2020  -     Mita test, indirect, qualitative; Future; Expected date: 08/10/2020  -     X-Ray Chest PA And Lateral; Future; Expected date: 08/10/2020    Other orders  -     Full code; Standing  -     Insert peripheral IV; Standing  -     Notify Physician - Potential Need of Opioid Reversal; Standing  -     Diet NPO; Standing  -     COVID-19 Rapid Screening; Standing     D&C, hysteroscopy +/- myosure Wednesday      Follow up if symptoms worsen or fail to improve.   Spent :55 minutes (>50% of visit) discussing the risks of AUB  , the  pathophysiology, etiology, risks, and principles of treatment.

## 2020-08-11 ENCOUNTER — ANESTHESIA EVENT (OUTPATIENT)
Dept: SURGERY | Facility: HOSPITAL | Age: 30
End: 2020-08-11
Payer: COMMERCIAL

## 2020-08-11 ENCOUNTER — HOSPITAL ENCOUNTER (OUTPATIENT)
Dept: RADIOLOGY | Facility: HOSPITAL | Age: 30
Discharge: HOME | End: 2020-08-11
Attending: NURSE PRACTITIONER
Payer: COMMERCIAL

## 2020-08-11 ENCOUNTER — HOSPITAL ENCOUNTER (OUTPATIENT)
Dept: PREADMISSION TESTING | Facility: HOSPITAL | Age: 30
Discharge: HOME | End: 2020-08-11
Attending: OBSTETRICS & GYNECOLOGY
Payer: COMMERCIAL

## 2020-08-11 VITALS — BODY MASS INDEX: 35.68 KG/M2 | WEIGHT: 222 LBS | HEIGHT: 66 IN

## 2020-08-11 DIAGNOSIS — Z01.818 PREOP EXAMINATION: ICD-10-CM

## 2020-08-11 PROCEDURE — 71046 X-RAY EXAM CHEST 2 VIEWS: CPT | Mod: 26,,, | Performed by: RADIOLOGY

## 2020-08-11 PROCEDURE — 71046 XR CHEST PA AND LATERAL: ICD-10-PCS | Mod: 26,,, | Performed by: RADIOLOGY

## 2020-08-11 PROCEDURE — 71046 X-RAY EXAM CHEST 2 VIEWS: CPT | Mod: TC,FY

## 2020-08-11 NOTE — ANESTHESIA PREPROCEDURE EVALUATION
08/11/2020  Oneida Ingram is a 30 y.o., female.    Anesthesia Evaluation    I have reviewed the Patient Summary Reports.    I have reviewed the Nursing Notes. I have reviewed the NPO Status.   I have reviewed the Medications.     Review of Systems  Social:  Non-Smoker    Hematology/Oncology:  Hematology Normal   Oncology Normal     EENT/Dental:EENT/Dental Normal   Cardiovascular:  Cardiovascular Normal     Pulmonary:   Pneumonia (2014)    Renal/:  Renal/ Normal     Hepatic/GI:  Hepatic/GI Normal    Musculoskeletal:  Musculoskeletal Normal    Neurological:  Neurology Normal    Endocrine:  Endocrine Normal    Dermatological:  Skin Normal        Physical Exam  General:  Well nourished, Obesity    Airway/Jaw/Neck:  Airway Findings: Mouth Opening: Normal Tongue: Normal  General Airway Assessment: Adult  Mallampati: II  TM Distance: Normal, at least 6 cm       Chest/Lungs:  Chest/Lungs Findings: Clear to auscultation     Heart/Vascular:  Heart Findings: Rate: Normal  Rhythm: Regular Rhythm        Mental Status:  Mental Status Findings:  Cooperative, Alert and Oriented         Anesthesia Plan  Type of Anesthesia, risks & benefits discussed:  Anesthesia Type:  general  Patient's Preference:   Intra-op Monitoring Plan: standard ASA monitors  Intra-op Monitoring Plan Comments:   Post Op Pain Control Plan: IV/PO Opioids PRN  Post Op Pain Control Plan Comments:   Induction:   IV  Beta Blocker:  Patient is not currently on a Beta-Blocker (No further documentation required).       Informed Consent: Patient understands risks and agrees with Anesthesia plan.  Questions answered. Anesthesia consent signed with patient.  ASA Score: 2     Day of Surgery Review of History & Physical: I have interviewed and examined the patient. I have reviewed the patient's H&P dated:            Ready For Surgery From Anesthesia Perspective.

## 2020-08-12 ENCOUNTER — ANESTHESIA (OUTPATIENT)
Dept: SURGERY | Facility: HOSPITAL | Age: 30
End: 2020-08-12
Payer: COMMERCIAL

## 2020-08-12 ENCOUNTER — HOSPITAL ENCOUNTER (OUTPATIENT)
Facility: HOSPITAL | Age: 30
Discharge: HOME | End: 2020-08-12
Attending: OBSTETRICS & GYNECOLOGY | Admitting: OBSTETRICS & GYNECOLOGY
Payer: COMMERCIAL

## 2020-08-12 DIAGNOSIS — N93.9 ABNORMAL UTERINE BLEEDING: ICD-10-CM

## 2020-08-12 DIAGNOSIS — R93.89 THICKENED ENDOMETRIUM: Primary | ICD-10-CM

## 2020-08-12 LAB — SARS-COV-2 RDRP RESP QL NAA+PROBE: NEGATIVE

## 2020-08-12 PROCEDURE — 71000033 HC RECOVERY, INTIAL HOUR: Performed by: OBSTETRICS & GYNECOLOGY

## 2020-08-12 PROCEDURE — 25000003 PHARM REV CODE 250: Performed by: NURSE ANESTHETIST, CERTIFIED REGISTERED

## 2020-08-12 PROCEDURE — S0020 INJECTION, BUPIVICAINE HYDRO: HCPCS | Performed by: OBSTETRICS & GYNECOLOGY

## 2020-08-12 PROCEDURE — D9220A PRA ANESTHESIA: ICD-10-PCS | Mod: ,,, | Performed by: ANESTHESIOLOGY

## 2020-08-12 PROCEDURE — 27201423 OPTIME MED/SURG SUP & DEVICES STERILE SUPPLY: Performed by: OBSTETRICS & GYNECOLOGY

## 2020-08-12 PROCEDURE — U0002 COVID-19 LAB TEST NON-CDC: HCPCS

## 2020-08-12 PROCEDURE — 25000003 PHARM REV CODE 250: Performed by: ANESTHESIOLOGY

## 2020-08-12 PROCEDURE — C1782 MORCELLATOR: HCPCS | Performed by: OBSTETRICS & GYNECOLOGY

## 2020-08-12 PROCEDURE — 88342 IMHCHEM/IMCYTCHM 1ST ANTB: CPT | Performed by: PATHOLOGY

## 2020-08-12 PROCEDURE — 37000008 HC ANESTHESIA 1ST 15 MINUTES: Performed by: OBSTETRICS & GYNECOLOGY

## 2020-08-12 PROCEDURE — 88342 IMHCHEM/IMCYTCHM 1ST ANTB: CPT | Mod: 26,,, | Performed by: PATHOLOGY

## 2020-08-12 PROCEDURE — 25000003 PHARM REV CODE 250: Performed by: NURSE PRACTITIONER

## 2020-08-12 PROCEDURE — 63600175 PHARM REV CODE 636 W HCPCS: Performed by: NURSE ANESTHETIST, CERTIFIED REGISTERED

## 2020-08-12 PROCEDURE — 88305 TISSUE EXAM BY PATHOLOGIST: CPT | Mod: 26,,, | Performed by: PATHOLOGY

## 2020-08-12 PROCEDURE — S0028 INJECTION, FAMOTIDINE, 20 MG: HCPCS | Performed by: ANESTHESIOLOGY

## 2020-08-12 PROCEDURE — D9220A PRA ANESTHESIA: Mod: ,,, | Performed by: ANESTHESIOLOGY

## 2020-08-12 PROCEDURE — 71000015 HC POSTOP RECOV 1ST HR: Performed by: OBSTETRICS & GYNECOLOGY

## 2020-08-12 PROCEDURE — 88342 CHG IMMUNOCYTOCHEMISTRY: ICD-10-PCS | Mod: 26,,, | Performed by: PATHOLOGY

## 2020-08-12 PROCEDURE — 37000009 HC ANESTHESIA EA ADD 15 MINS: Performed by: OBSTETRICS & GYNECOLOGY

## 2020-08-12 PROCEDURE — 25000003 PHARM REV CODE 250: Performed by: OBSTETRICS & GYNECOLOGY

## 2020-08-12 PROCEDURE — 88305 TISSUE EXAM BY PATHOLOGIST: CPT | Performed by: PATHOLOGY

## 2020-08-12 PROCEDURE — 88305 TISSUE EXAM BY PATHOLOGIST: ICD-10-PCS | Mod: 26,,, | Performed by: PATHOLOGY

## 2020-08-12 PROCEDURE — 36000707: Performed by: OBSTETRICS & GYNECOLOGY

## 2020-08-12 PROCEDURE — 36000706: Performed by: OBSTETRICS & GYNECOLOGY

## 2020-08-12 PROCEDURE — 63600175 PHARM REV CODE 636 W HCPCS: Performed by: ANESTHESIOLOGY

## 2020-08-12 RX ORDER — SODIUM CHLORIDE, SODIUM LACTATE, POTASSIUM CHLORIDE, CALCIUM CHLORIDE 600; 310; 30; 20 MG/100ML; MG/100ML; MG/100ML; MG/100ML
125 INJECTION, SOLUTION INTRAVENOUS CONTINUOUS
Status: DISCONTINUED | OUTPATIENT
Start: 2020-08-12 | End: 2020-10-19

## 2020-08-12 RX ORDER — ONDANSETRON 2 MG/ML
4 INJECTION INTRAMUSCULAR; INTRAVENOUS DAILY PRN
Status: DISCONTINUED | OUTPATIENT
Start: 2020-08-12 | End: 2020-10-19

## 2020-08-12 RX ORDER — LIDOCAINE HYDROCHLORIDE 20 MG/ML
INJECTION, SOLUTION EPIDURAL; INFILTRATION; INTRACAUDAL; PERINEURAL
Status: DISCONTINUED | OUTPATIENT
Start: 2020-08-12 | End: 2020-08-12

## 2020-08-12 RX ORDER — SODIUM CHLORIDE, SODIUM LACTATE, POTASSIUM CHLORIDE, CALCIUM CHLORIDE 600; 310; 30; 20 MG/100ML; MG/100ML; MG/100ML; MG/100ML
INJECTION, SOLUTION INTRAVENOUS CONTINUOUS
Status: DISCONTINUED | OUTPATIENT
Start: 2020-08-12 | End: 2020-10-19

## 2020-08-12 RX ORDER — BUPIVACAINE HYDROCHLORIDE 5 MG/ML
INJECTION, SOLUTION EPIDURAL; INTRACAUDAL
Status: DISCONTINUED | OUTPATIENT
Start: 2020-08-12 | End: 2020-08-12 | Stop reason: HOSPADM

## 2020-08-12 RX ORDER — OXYCODONE AND ACETAMINOPHEN 10; 325 MG/1; MG/1
1 TABLET ORAL EVERY 6 HOURS PRN
Qty: 14 TABLET | Refills: 0 | Status: SHIPPED | OUTPATIENT
Start: 2020-08-12 | End: 2020-10-19

## 2020-08-12 RX ORDER — LIDOCAINE HYDROCHLORIDE 10 MG/ML
INJECTION INFILTRATION; PERINEURAL
Status: DISCONTINUED | OUTPATIENT
Start: 2020-08-12 | End: 2020-08-12 | Stop reason: HOSPADM

## 2020-08-12 RX ORDER — SODIUM CHLORIDE 9 MG/ML
INJECTION, SOLUTION INTRAVENOUS CONTINUOUS
Status: DISCONTINUED | OUTPATIENT
Start: 2020-08-12 | End: 2020-08-12 | Stop reason: HOSPADM

## 2020-08-12 RX ORDER — AMOXICILLIN 250 MG
1 CAPSULE ORAL 2 TIMES DAILY
COMMUNITY
Start: 2020-08-12 | End: 2020-10-19

## 2020-08-12 RX ORDER — DIPHENHYDRAMINE HCL 25 MG
25 CAPSULE ORAL EVERY 4 HOURS PRN
Status: CANCELLED | OUTPATIENT
Start: 2020-08-12

## 2020-08-12 RX ORDER — ONDANSETRON 4 MG/1
8 TABLET, ORALLY DISINTEGRATING ORAL EVERY 8 HOURS PRN
Status: DISCONTINUED | OUTPATIENT
Start: 2020-08-12 | End: 2020-08-12 | Stop reason: HOSPADM

## 2020-08-12 RX ORDER — DOXYCYCLINE HYCLATE 100 MG
200 TABLET ORAL ONCE
Status: CANCELLED | OUTPATIENT
Start: 2020-08-12 | End: 2020-08-12

## 2020-08-12 RX ORDER — LIDOCAINE HYDROCHLORIDE 10 MG/ML
1 INJECTION, SOLUTION EPIDURAL; INFILTRATION; INTRACAUDAL; PERINEURAL ONCE
Status: DISCONTINUED | OUTPATIENT
Start: 2020-08-12 | End: 2020-10-19

## 2020-08-12 RX ORDER — IBUPROFEN 600 MG/1
600 TABLET ORAL EVERY 6 HOURS PRN
Status: CANCELLED | OUTPATIENT
Start: 2020-08-12

## 2020-08-12 RX ORDER — DIPHENHYDRAMINE HYDROCHLORIDE 50 MG/ML
25 INJECTION INTRAMUSCULAR; INTRAVENOUS EVERY 4 HOURS PRN
Status: CANCELLED | OUTPATIENT
Start: 2020-08-12

## 2020-08-12 RX ORDER — PROPOFOL 10 MG/ML
VIAL (ML) INTRAVENOUS
Status: DISCONTINUED | OUTPATIENT
Start: 2020-08-12 | End: 2020-08-12

## 2020-08-12 RX ORDER — MORPHINE SULFATE 4 MG/ML
2 INJECTION, SOLUTION INTRAMUSCULAR; INTRAVENOUS EVERY 5 MIN PRN
Status: DISCONTINUED | OUTPATIENT
Start: 2020-08-12 | End: 2020-10-19

## 2020-08-12 RX ORDER — DIPHENHYDRAMINE HYDROCHLORIDE 50 MG/ML
12.5 INJECTION INTRAMUSCULAR; INTRAVENOUS
Status: DISCONTINUED | OUTPATIENT
Start: 2020-08-12 | End: 2020-10-19

## 2020-08-12 RX ORDER — DEXAMETHASONE SODIUM PHOSPHATE 4 MG/ML
INJECTION, SOLUTION INTRA-ARTICULAR; INTRALESIONAL; INTRAMUSCULAR; INTRAVENOUS; SOFT TISSUE
Status: DISCONTINUED | OUTPATIENT
Start: 2020-08-12 | End: 2020-08-12

## 2020-08-12 RX ORDER — MEPERIDINE HYDROCHLORIDE 50 MG/ML
INJECTION INTRAMUSCULAR; INTRAVENOUS; SUBCUTANEOUS
Status: DISCONTINUED | OUTPATIENT
Start: 2020-08-12 | End: 2020-08-12

## 2020-08-12 RX ORDER — MIDAZOLAM HYDROCHLORIDE 1 MG/ML
INJECTION, SOLUTION INTRAMUSCULAR; INTRAVENOUS
Status: DISCONTINUED | OUTPATIENT
Start: 2020-08-12 | End: 2020-08-12

## 2020-08-12 RX ORDER — ONDANSETRON 2 MG/ML
INJECTION INTRAMUSCULAR; INTRAVENOUS
Status: DISCONTINUED | OUTPATIENT
Start: 2020-08-12 | End: 2020-08-12

## 2020-08-12 RX ORDER — FAMOTIDINE 10 MG/ML
20 INJECTION INTRAVENOUS ONCE
Status: COMPLETED | OUTPATIENT
Start: 2020-08-12 | End: 2020-08-12

## 2020-08-12 RX ORDER — KETOROLAC TROMETHAMINE 30 MG/ML
INJECTION, SOLUTION INTRAMUSCULAR; INTRAVENOUS
Status: DISCONTINUED | OUTPATIENT
Start: 2020-08-12 | End: 2020-08-12

## 2020-08-12 RX ORDER — IBUPROFEN 800 MG/1
800 TABLET ORAL EVERY 8 HOURS PRN
Qty: 30 TABLET | Refills: 0 | Status: SHIPPED | OUTPATIENT
Start: 2020-08-12 | End: 2020-10-19

## 2020-08-12 RX ADMIN — LIDOCAINE HYDROCHLORIDE 100 MG: 20 INJECTION, SOLUTION EPIDURAL; INFILTRATION; INTRACAUDAL; PERINEURAL at 11:08

## 2020-08-12 RX ADMIN — SODIUM CHLORIDE, SODIUM LACTATE, POTASSIUM CHLORIDE, AND CALCIUM CHLORIDE: .6; .31; .03; .02 INJECTION, SOLUTION INTRAVENOUS at 10:08

## 2020-08-12 RX ADMIN — PROPOFOL 30 MG: 10 INJECTION, EMULSION INTRAVENOUS at 12:08

## 2020-08-12 RX ADMIN — PROPOFOL 30 MG: 10 INJECTION, EMULSION INTRAVENOUS at 11:08

## 2020-08-12 RX ADMIN — Medication 25 MG: at 11:08

## 2020-08-12 RX ADMIN — DEXAMETHASONE SODIUM PHOSPHATE 4 MG: 4 INJECTION, SOLUTION INTRAMUSCULAR; INTRAVENOUS at 11:08

## 2020-08-12 RX ADMIN — ONDANSETRON HYDROCHLORIDE 4 MG: 2 SOLUTION INTRAMUSCULAR; INTRAVENOUS at 12:08

## 2020-08-12 RX ADMIN — KETOROLAC TROMETHAMINE 30 MG: 30 INJECTION, SOLUTION INTRAMUSCULAR at 12:08

## 2020-08-12 RX ADMIN — PROPOFOL 20 MG: 10 INJECTION, EMULSION INTRAVENOUS at 12:08

## 2020-08-12 RX ADMIN — SODIUM CHLORIDE: 0.9 INJECTION, SOLUTION INTRAVENOUS at 12:08

## 2020-08-12 RX ADMIN — MIDAZOLAM HYDROCHLORIDE 2 MG: 1 INJECTION, SOLUTION INTRAMUSCULAR; INTRAVENOUS at 11:08

## 2020-08-12 RX ADMIN — MORPHINE SULFATE 2 MG: 4 INJECTION INTRAVENOUS at 12:08

## 2020-08-12 RX ADMIN — PROPOFOL 100 MG: 10 INJECTION, EMULSION INTRAVENOUS at 11:08

## 2020-08-12 RX ADMIN — FAMOTIDINE 20 MG: 10 INJECTION INTRAVENOUS at 10:08

## 2020-08-12 RX ADMIN — ONDANSETRON 4 MG: 2 INJECTION INTRAMUSCULAR; INTRAVENOUS at 11:08

## 2020-08-12 NOTE — INTERVAL H&P NOTE
The patient has been examined and the H&P has been reviewed:    I concur with the findings and no changes have occurred since H&P was written.    Surgery risks, benefits and alternative options discussed and understood by patient/family.          Active Hospital Problems    Diagnosis  POA    Abnormal uterine bleeding [N93.9]  Yes      Resolved Hospital Problems   No resolved problems to display.

## 2020-08-12 NOTE — TRANSFER OF CARE
"Anesthesia Transfer of Care Note    Patient: Oneida Ingram    Procedure(s) Performed: Procedure(s) (LRB):  DILATION AND CURETTAGE, UTERUS (N/A)  HYSTEROSCOPY (N/A)  POLYPECTOMY, UTERUS, HYSTEROSCOPIC (N/A)    Patient location: PACU    Anesthesia Type: general    Transport from OR: Transported from OR on room air with adequate spontaneous ventilation    Post pain: adequate analgesia    Post assessment: no apparent anesthetic complications and tolerated procedure well    Post vital signs: stable    Level of consciousness: awake, alert and oriented    Nausea/Vomiting: no nausea/vomiting    Complications: none    Transfer of care protocol was followed      Last vitals:   Visit Vitals  /78   Pulse 75   Temp 36.6 °C (97.8 °F) (Oral)   Resp 18   Ht 5' 6" (1.676 m)   Wt 100.7 kg (222 lb)   LMP 07/13/2020 (Exact Date)   SpO2 97%   Breastfeeding No   BMI 35.83 kg/m²     "

## 2020-08-12 NOTE — OP NOTE
[facility] - Periop Services  Operative Note     SUMMARY     Surgery Date: 8/12/2020     Procedure Performed By: Srinivasan Stewart MD    Procedure Performed: D & C Hysteroscopy with Myosure     Anesthesia:  General/MAC    Assisted By: lisa    Pre-op Diagnosis:  Abnormal uterine bleeding [N93.9]    Post-op Diagnosis:  Post-Op Diagnosis Codes:     * Abnormal uterine bleeding [N93.9]     Estimated Blood Loss: *50cc  Complications: none  Specimen:  Specimens (From admission, onward)     Start     Ordered    08/12/20 1235  Specimen to Pathology, Surgery Gynecology and Obstetrics  Once     Question:  Procedure Type:  Answer:  Gynecology and Obstetrics    08/12/20 1235              Findings: The patient sounded to 12 cm.  She had a very thickened endometrium.  No distinct polyp or fibroid noted        Procedure Performed: D & C Hysteroscopy with Myosure     Procedure in Detail: After ensuring informed consent, the patient was taken to the operating room where general anesthesia was initiated. A time out was performed with the O.R. crew. She was placed in the adjustable Kofi stirrups. Her perineum was prepped and draped in the usual sterile fashion. The anterior lip of the cervix was grasped with a single- toothed tenaculum. The uterus was sounded to the above stated length. The patient was gently dilated to the highest dilatation with the Hanks dilators. The hysteroscope was then placed inside the patients uterus, and inspection of the patients uterus revealed the above findings. The Myosure device was placed inside the uterine cavity. The endometrial pathology was removed. The hysteroscope was removed. All instruments were removed from the patients vagina. She was taken out of the adjustable Kofi stirrups and placed in the supine position. She was awakened from anesthesia and taken to the recovery room in stable condition.  All counts were correct x 2. The patient tolerated the procedure well. The  tissue was sent to the pathology.

## 2020-08-12 NOTE — ANESTHESIA POSTPROCEDURE EVALUATION
Anesthesia Post Evaluation    Patient: Oneida Ingram    Procedure(s) Performed: Procedure(s) (LRB):  DILATION AND CURETTAGE, UTERUS (N/A)  HYSTEROSCOPY (N/A)  POLYPECTOMY, UTERUS, HYSTEROSCOPIC (N/A)    Final Anesthesia Type: general    Patient location during evaluation: PACU  Patient participation: Yes- Able to Participate  Level of consciousness: awake and alert  Post-procedure vital signs: reviewed and stable  Pain management: adequate  Airway patency: patent    PONV status at discharge: No PONV  Anesthetic complications: no      Cardiovascular status: blood pressure returned to baseline  Respiratory status: unassisted  Hydration status: euvolemic  Follow-up not needed.          Vitals Value Taken Time   /80 08/12/20 1331   Temp 36.5 °C (97.7 °F) 08/12/20 1235   Pulse 85 08/12/20 1345   Resp 11 08/12/20 1345   SpO2 97 % 08/12/20 1345   Vitals shown include unvalidated device data.      Event Time   Out of Recovery 13:28:36         Pain/Adolph Score: Pain Rating Prior to Med Admin: 8 (8/12/2020 12:43 PM)  Adolph Score: 10 (8/12/2020  1:30 PM)

## 2020-08-12 NOTE — PLAN OF CARE
Patient arrives to PACU via stretcher monitor connected. PIV intact and infusing LR'S to gravity. Bear hugger at bedside. Kalina pad in place. VS'S, No complaints will continue to monitor.

## 2020-08-12 NOTE — BRIEF OP NOTE
[facility] - Periop Services  Brief Operative Note    Surgery Date: 8/12/2020     Surgeon(s) and Role:     * Srinivasan Stewart MD - Primary    Assisting Surgeon: None    Pre-op Diagnosis:  Abnormal uterine bleeding [N93.9]    Post-op Diagnosis:  Post-Op Diagnosis Codes:     * Abnormal uterine bleeding [N93.9]    Procedure(s) (LRB):  DILATION AND CURETTAGE, UTERUS (N/A)  HYSTEROSCOPY (N/A)  POLYPECTOMY, UTERUS, HYSTEROSCOPIC (N/A)    Anesthesia: General/MAC    Description of the findings of the procedure(s):  The patient sounded to 12 cm.  She had a very thickened endometrium.  No distinct polyp or fibroid noted.    Estimated Blood Loss: *50 cc       Specimens: Endometrium        Discharge Note    OUTCOME: Patient tolerated treatment/procedure well without complication and is now ready for discharge.    DISPOSITION: Home or Self Care    FINAL DIAGNOSIS:  Abnormal uterine bleeding    FOLLOWUP: In clinic    DISCHARGE INSTRUCTIONS:    Discharge Procedure Orders   Diet general     Call MD for:  temperature >100.4     Call MD for:  persistent nausea and vomiting     Call MD for:  severe uncontrolled pain     Call MD for:  difficulty breathing, headache or visual disturbances     Call MD for:  redness, tenderness, or signs of infection (pain, swelling, redness, odor or green/yellow discharge around incision site)        Clinical Reference Documents Added to Patient Instructions       Document    AFTER YOUR SURGERY: DISCHARGE INSTRUCTIONS (ENGLISH)    DILATION AND CURETTAGE (D AND C), DISCHARGE INSTRUCTIONS (ENGLISH)    DILATION AND CURETTAGE (ENGLISH)    HYSTEROSCOPY (ENGLISH)

## 2020-08-13 ENCOUNTER — PATIENT MESSAGE (OUTPATIENT)
Dept: FAMILY MEDICINE | Facility: CLINIC | Age: 30
End: 2020-08-13

## 2020-08-13 LAB
BACTERIA GENITAL AEROBE CULT: NORMAL
GRAM STN SPEC: NORMAL

## 2020-08-14 LAB
CHLAMYDIA, AMPLIFIED DNA: NEGATIVE
N GONORRHOEAE, AMPLIFIED DNA: NEGATIVE

## 2020-08-17 VITALS
HEART RATE: 77 BPM | RESPIRATION RATE: 14 BRPM | BODY MASS INDEX: 35.68 KG/M2 | HEIGHT: 66 IN | TEMPERATURE: 98 F | DIASTOLIC BLOOD PRESSURE: 80 MMHG | WEIGHT: 222 LBS | SYSTOLIC BLOOD PRESSURE: 127 MMHG | OXYGEN SATURATION: 97 %

## 2020-08-18 LAB
FINAL PATHOLOGIC DIAGNOSIS: NORMAL
GROSS: NORMAL
MICROSCOPIC EXAM: NORMAL

## 2020-08-20 ENCOUNTER — TELEPHONE (OUTPATIENT)
Dept: FAMILY MEDICINE | Facility: CLINIC | Age: 30
End: 2020-08-20

## 2020-08-20 NOTE — TELEPHONE ENCOUNTER
----- Message from [facility], RT sent at 7/22/2020 11:59 AM CDT -----  Regarding: Lab due next week  Elsa Bowers, NP   7/22/2020 11:47 AM    White count is normal but liver enzymes are mildly elevated, which is not uncommon with acute gastroenteritis. We will monitor this for now and repeat in about 3 weeks.   Informed pt via portal, please set remind me to repeat CMP in 3 weeks.

## 2020-08-20 NOTE — TELEPHONE ENCOUNTER
This remind me came up today. Looks like she had CMP done for Kiana Holman, PADMINI on 8/9/2020. FYI to Elsa to review.

## 2020-09-09 LAB
ALBUMIN SERPL-MCNC: 4.2 G/DL (ref 3.6–5.1)
ALBUMIN/GLOB SERPL: 1.9 (CALC) (ref 1–2.5)
ALP SERPL-CCNC: 63 U/L (ref 31–125)
ALT SERPL-CCNC: 48 U/L (ref 6–29)
AST SERPL-CCNC: 20 U/L (ref 10–30)
BILIRUB SERPL-MCNC: 0.3 MG/DL (ref 0.2–1.2)
BUN SERPL-MCNC: 9 MG/DL (ref 7–25)
BUN/CREAT SERPL: ABNORMAL (CALC) (ref 6–22)
CALCIUM SERPL-MCNC: 8.8 MG/DL (ref 8.6–10.2)
CHLORIDE SERPL-SCNC: 106 MMOL/L (ref 98–110)
CO2 SERPL-SCNC: 27 MMOL/L (ref 20–32)
CREAT SERPL-MCNC: 0.61 MG/DL (ref 0.5–1.1)
GFRSERPLBLD MDRD-ARVRAT: 123 ML/MIN/1.73M2
GLOBULIN SER CALC-MCNC: 2.2 G/DL (CALC) (ref 1.9–3.7)
GLUCOSE SERPL-MCNC: 102 MG/DL (ref 65–139)
POTASSIUM SERPL-SCNC: 3.8 MMOL/L (ref 3.5–5.3)
PROT SERPL-MCNC: 6.4 G/DL (ref 6.1–8.1)
SODIUM SERPL-SCNC: 141 MMOL/L (ref 135–146)

## 2020-09-14 ENCOUNTER — TELEPHONE (OUTPATIENT)
Dept: FAMILY MEDICINE | Facility: CLINIC | Age: 30
End: 2020-09-14
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
## 2020-09-14 NOTE — TELEPHONE ENCOUNTER
Please let pt know liver enzymes are still mildly elevated but are trending down. Will repeat in 4 months.

## 2020-09-14 NOTE — TELEPHONE ENCOUNTER
----- Message from Elsa Bowers NP sent at 9/14/2020  9:47 AM CDT -----  Please let pt know liver enzymes are still mildly elevated but are trending down. Will repeat in 4 months.     Cliff Arias MD   9/13/2020  5:47 PM      Call pt. Sugar and kidneys are normal, Liver is improving,but sl elevated. Cont current meds

## 2020-09-16 NOTE — TELEPHONE ENCOUNTER
Looks like she is active on the portal so maybe just send her a message and then can delano as done.

## 2020-09-16 NOTE — TELEPHONE ENCOUNTER
LMOR. This is 3rd attempt at trying to reach patient. I have created remind me for lab. Can I delano this result complete?

## 2020-09-16 NOTE — TELEPHONE ENCOUNTER
Left patient 3 messages and sent portal message with results. Per Elsa on to delano results reviewed.

## 2020-09-24 ENCOUNTER — PATIENT MESSAGE (OUTPATIENT)
Dept: FAMILY MEDICINE | Facility: CLINIC | Age: 30
End: 2020-09-24

## 2020-09-25 ENCOUNTER — PATIENT MESSAGE (OUTPATIENT)
Dept: FAMILY MEDICINE | Facility: CLINIC | Age: 30
End: 2020-09-25

## 2020-09-28 ENCOUNTER — PATIENT MESSAGE (OUTPATIENT)
Dept: FAMILY MEDICINE | Facility: CLINIC | Age: 30
End: 2020-09-28

## 2020-09-28 DIAGNOSIS — G44.229 CHRONIC TENSION-TYPE HEADACHE, NOT INTRACTABLE: Primary | ICD-10-CM

## 2020-09-28 RX ORDER — BUTALBITAL, ACETAMINOPHEN AND CAFFEINE 50; 325; 40 MG/1; MG/1; MG/1
1 TABLET ORAL EVERY 4 HOURS PRN
Qty: 20 TABLET | Refills: 1 | Status: SHIPPED | OUTPATIENT
Start: 2020-09-28 | End: 2020-10-28

## 2020-10-19 ENCOUNTER — OFFICE VISIT (OUTPATIENT)
Dept: FAMILY MEDICINE | Facility: CLINIC | Age: 30
End: 2020-10-19
Payer: COMMERCIAL

## 2020-10-19 VITALS
TEMPERATURE: 98 F | WEIGHT: 222 LBS | HEART RATE: 72 BPM | BODY MASS INDEX: 35.68 KG/M2 | HEIGHT: 66 IN | SYSTOLIC BLOOD PRESSURE: 118 MMHG | DIASTOLIC BLOOD PRESSURE: 82 MMHG

## 2020-10-19 DIAGNOSIS — J30.2 SEASONAL ALLERGIES: ICD-10-CM

## 2020-10-19 DIAGNOSIS — G44.229 CHRONIC TENSION-TYPE HEADACHE, NOT INTRACTABLE: ICD-10-CM

## 2020-10-19 DIAGNOSIS — Z79.899 HIGH RISK MEDICATION USE: ICD-10-CM

## 2020-10-19 DIAGNOSIS — F41.9 ANXIETY: Primary | ICD-10-CM

## 2020-10-19 DIAGNOSIS — E28.2 PCOS (POLYCYSTIC OVARIAN SYNDROME): ICD-10-CM

## 2020-10-19 DIAGNOSIS — F33.42 RECURRENT MAJOR DEPRESSIVE DISORDER, IN FULL REMISSION: ICD-10-CM

## 2020-10-19 PROCEDURE — 99214 OFFICE O/P EST MOD 30 MIN: CPT | Mod: S$GLB,,, | Performed by: NURSE PRACTITIONER

## 2020-10-19 PROCEDURE — 3008F PR BODY MASS INDEX (BMI) DOCUMENTED: ICD-10-PCS | Mod: S$GLB,,, | Performed by: NURSE PRACTITIONER

## 2020-10-19 PROCEDURE — 3008F BODY MASS INDEX DOCD: CPT | Mod: S$GLB,,, | Performed by: NURSE PRACTITIONER

## 2020-10-19 PROCEDURE — 99214 PR OFFICE/OUTPT VISIT, EST, LEVL IV, 30-39 MIN: ICD-10-PCS | Mod: S$GLB,,, | Performed by: NURSE PRACTITIONER

## 2020-10-19 NOTE — PROGRESS NOTES
"  SUBJECTIVE:    Patient ID: Oneida Ingram is a 30 y.o. female.    Chief Complaint: Follow-up (6 month//brought bottles//refused flu shot tb )    30 year old female presents for check up. Doing ok. Does feel stressed at times. Taking meds as prescribed. Currently trying to get pregnant. Recently saw her ob and was referred to a specialist for high risk maternal fetal med. Sleeping ok. Needs refill son zoloft.        Admission on 08/12/2020, Discharged on 08/12/2020   Component Date Value Ref Range Status    SARS-CoV-2 RNA, Amplification, Qual 08/12/2020 Negative  Negative Final    Final Pathologic Diagnosis 08/12/2020    Final                    Value:Endometrium, biopsy:  Secretory phase endometrium  Smooth muscle fragments with endometrial glands, possible adenomyosis  Negative for hyperplasia or malignancy      Gross 08/12/2020    Final                    Value:Container Label: Clinic Number/AP Number:  [id number], and "endometrium "  Received in formalin is a 4.0 x 3.0 x 0.5 cm aggregate of soft tan tissue  fragments.  Specimen is filtered and entirely submitted in 1042 A-D.  WBG--1-A  LKS--1-B  TIH--1-C  WVR--1-D  Tamar Cleary      Microscopic Exam 08/12/2020 P 53 performed with adequate control fails to highlight a high-grade lesion.   Final   Lab Visit on 08/11/2020   Component Date Value Ref Range Status    WBC 08/11/2020 7.33  3.90 - 12.70 K/uL Final    RBC 08/11/2020 4.45  4.00 - 5.40 M/uL Final    Hemoglobin 08/11/2020 13.1  12.0 - 16.0 g/dL Final    Hematocrit 08/11/2020 38.3  37.0 - 48.5 % Final    Mean Corpuscular Volume 08/11/2020 86  82 - 98 fL Final    Mean Corpuscular Hemoglobin 08/11/2020 29.4  27.0 - 31.0 pg Final    Mean Corpuscular Hemoglobin Conc 08/11/2020 34.2  32.0 - 36.0 g/dL Final    RDW 08/11/2020 12.4  11.5 - 14.5 % Final    Platelets 08/11/2020 325  150 - 350 K/uL Final    MPV 08/11/2020 11.4  9.2 - 12.9 fL Final    Immature Granulocytes 08/11/2020 " 0.1  0.0 - 0.5 % Final    Gran # (ANC) 08/11/2020 3.1  1.8 - 7.7 K/uL Final    Immature Grans (Abs) 08/11/2020 0.01  0.00 - 0.04 K/uL Final    Lymph # 08/11/2020 3.2  1.0 - 4.8 K/uL Final    Mono # 08/11/2020 0.6  0.3 - 1.0 K/uL Final    Eos # 08/11/2020 0.4  0.0 - 0.5 K/uL Final    Baso # 08/11/2020 0.05  0.00 - 0.20 K/uL Final    nRBC 08/11/2020 0  0 /100 WBC Final    Gran% 08/11/2020 42.2  38.0 - 73.0 % Final    Lymph% 08/11/2020 44.2  18.0 - 48.0 % Final    Mono% 08/11/2020 7.8  4.0 - 15.0 % Final    Eosinophil% 08/11/2020 5.0  0.0 - 8.0 % Final    Basophil% 08/11/2020 0.7  0.0 - 1.9 % Final    Differential Method 08/11/2020 Automated   Final    Group & Rh 08/11/2020 A POS   Final    Indirect Mita GEL 08/11/2020 NEG   Final   Office Visit on 08/10/2020   Component Date Value Ref Range Status    POC Preg Test, Ur 08/10/2020 Negative  Negative Final     Acceptable 08/10/2020 Yes   Final    Amphetamine, Qual, Ur 08/10/2020 Negative  Negative Final    METHAMPHETAMINE, URINE SCREEN 08/10/2020 Negative  Negative Final    Cocaine (Metabolite), Qual, Ur 08/10/2020 Negative  Negative Final    THC, UR 08/10/2020 Negative  Negative Final    Opiate Screen, Urine 08/10/2020 Negative  Negative Final    Benzodiazepines 08/10/2020 positive*  Final   Admission on 08/09/2020, Discharged on 08/09/2020   Component Date Value Ref Range Status    POC Preg Test, Ur 08/09/2020 Negative  Negative Final     Acceptable 08/09/2020 Yes   Final    Specimen UA 08/09/2020 Urine, Clean Catch   Final    Color, UA 08/09/2020 Yellow  Yellow, Straw, Cristina Final    Appearance, UA 08/09/2020 Clear  Clear Final    pH, UA 08/09/2020 5.0  5.0 - 8.0 Final    Specific [place], UA 08/09/2020 1.020  1.005 - 1.030 Final    Protein, UA 08/09/2020 Negative  Negative Final    Glucose, UA 08/09/2020 Negative  Negative Final    Ketones, UA 08/09/2020 Negative  Negative Final    Bilirubin (UA)  08/09/2020 Negative  Negative Final    Occult Blood UA 08/09/2020 3+* Negative Final    Nitrite, UA 08/09/2020 Negative  Negative Final    Urobilinogen, UA 08/09/2020 Negative  Negative EU/dL Final    Leukocytes, UA 08/09/2020 Negative  Negative Final    WBC 08/09/2020 8.70  3.90 - 12.70 K/uL Final    RBC 08/09/2020 4.61  4.00 - 5.40 M/uL Final    Hemoglobin 08/09/2020 13.4  12.0 - 16.0 g/dL Final    Hematocrit 08/09/2020 39.0  37.0 - 48.5 % Final    Mean Corpuscular Volume 08/09/2020 85  82 - 98 fL Final    Mean Corpuscular Hemoglobin 08/09/2020 29.1  27.0 - 31.0 pg Final    Mean Corpuscular Hemoglobin Conc 08/09/2020 34.4  32.0 - 36.0 g/dL Final    RDW 08/09/2020 12.5  11.5 - 14.5 % Final    Platelets 08/09/2020 341  150 - 350 K/uL Final    MPV 08/09/2020 11.4  9.2 - 12.9 fL Final    Immature Granulocytes 08/09/2020 0.2  0.0 - 0.5 % Final    Gran # (ANC) 08/09/2020 5.1  1.8 - 7.7 K/uL Final    Immature Grans (Abs) 08/09/2020 0.02  0.00 - 0.04 K/uL Final    Lymph # 08/09/2020 2.5  1.0 - 4.8 K/uL Final    Mono # 08/09/2020 0.7  0.3 - 1.0 K/uL Final    Eos # 08/09/2020 0.3  0.0 - 0.5 K/uL Final    Baso # 08/09/2020 0.04  0.00 - 0.20 K/uL Final    nRBC 08/09/2020 0  0 /100 WBC Final    Gran% 08/09/2020 58.7  38.0 - 73.0 % Final    Lymph% 08/09/2020 28.9  18.0 - 48.0 % Final    Mono% 08/09/2020 8.5  4.0 - 15.0 % Final    Eosinophil% 08/09/2020 3.2  0.0 - 8.0 % Final    Basophil% 08/09/2020 0.5  0.0 - 1.9 % Final    Differential Method 08/09/2020 Automated   Final    Sodium 08/09/2020 138  136 - 145 mmol/L Final    Potassium 08/09/2020 3.5  3.5 - 5.1 mmol/L Final    Chloride 08/09/2020 109  95 - 110 mmol/L Final    CO2 08/09/2020 21* 23 - 29 mmol/L Final    Glucose 08/09/2020 107  70 - 110 mg/dL Final    BUN, Bld 08/09/2020 11  6 - 20 mg/dL Final    Creatinine 08/09/2020 0.6  0.5 - 1.4 mg/dL Final    Calcium 08/09/2020 8.6* 8.7 - 10.5 mg/dL Final    Total Protein 08/09/2020 6.8  6.0  - 8.4 g/dL Final    Albumin 08/09/2020 4.3  3.5 - 5.2 g/dL Final    Total Bilirubin 08/09/2020 1.2* 0.1 - 1.0 mg/dL Final    Alkaline Phosphatase 08/09/2020 70  55 - 135 U/L Final    AST 08/09/2020 34  10 - 40 U/L Final    ALT 08/09/2020 79* 10 - 44 U/L Final    Anion Gap 08/09/2020 8  8 - 16 mmol/L Final    eGFR if African American 08/09/2020 >60.0  >60 mL/min/1.73 m^2 Final    eGFR if non African American 08/09/2020 >60.0  >60 mL/min/1.73 m^2 Final    Group & Rh 08/09/2020 A POS   Final    Indirect Mita 08/09/2020 NEG   Final    RBC, UA 08/09/2020 >100* 0 - 4 /hpf Final    WBC, UA 08/09/2020 1  0 - 5 /hpf Final    Bacteria 08/09/2020 Negative  None-Occ /hpf Final    Squam Epithel, UA 08/09/2020 1  /hpf Final    Hyaline Casts, UA 08/09/2020 5* 0-1/lpf /lpf Final    Microscopic Comment 08/09/2020 SEE COMMENT   Final    Trichomonas 08/09/2020 None  None Final    Budding Yeast 08/09/2020 None  None Final    Wet Prep Source 08/09/2020 Vagina   Final    Chlamydia, Amplified DNA 08/09/2020 Negative  Negative Final    N gonorrhoeae, amplified DNA 08/09/2020 Negative  Negative Final    GC Culture Only 08/09/2020 No Neisseria gonorrhoeae isolated   Final    Gram Stain Result 08/09/2020 Moderate WBC's   Final    Gram Stain Result 08/09/2020 Many Gram positive rods   Final    Gram Stain Result 08/09/2020 Rare Gram positive cocci   Final    Gram Stain Result 08/09/2020 Rare Gram negative rods   Final    Gram Stain Result 08/09/2020 Moderate epithelial cells   Final   Telephone on 08/06/2020   Component Date Value Ref Range Status    Glucose 09/08/2020 102  65 - 139 mg/dL Final    BUN, Bld 09/08/2020 9  7 - 25 mg/dL Final    Creatinine 09/08/2020 0.61  0.50 - 1.10 mg/dL Final    eGFR if non African American 09/08/2020 123  > OR = 60 mL/min/1.73m2 Final    eGFR if African American 09/08/2020 142  > OR = 60 mL/min/1.73m2 Final    BUN/Creatinine Ratio 09/08/2020 NOT APPLICABLE  6 - 22 (calc)  Final    Sodium 09/08/2020 141  135 - 146 mmol/L Final    Potassium 09/08/2020 3.8  3.5 - 5.3 mmol/L Final    Chloride 09/08/2020 106  98 - 110 mmol/L Final    CO2 09/08/2020 27  20 - 32 mmol/L Final    Calcium 09/08/2020 8.8  8.6 - 10.2 mg/dL Final    Total Protein 09/08/2020 6.4  6.1 - 8.1 g/dL Final    Albumin 09/08/2020 4.2  3.6 - 5.1 g/dL Final    Globulin, Total 09/08/2020 2.2  1.9 - 3.7 g/dL (calc) Final    Albumin/Globulin Ratio 09/08/2020 1.9  1.0 - 2.5 (calc) Final    Total Bilirubin 09/08/2020 0.3  0.2 - 1.2 mg/dL Final    Alkaline Phosphatase 09/08/2020 63  31 - 125 U/L Final    AST 09/08/2020 20  10 - 30 U/L Final    ALT 09/08/2020 48* 6 - 29 U/L Final   Office Visit on 07/27/2020   Component Date Value Ref Range Status    POC Preg Test, Ur 07/27/2020 Negative  Negative Final     Acceptable 07/27/2020 Yes   Final    Progesterone 07/27/2020 3.6  ng/mL Final    HCG, Quantitative 07/27/2020 <1  mIU/mL Final   Patient Message on 07/21/2020   Component Date Value Ref Range Status    Shiga Toxins, EIA W/Rfl To E.Coli * 07/24/2020    Final    Culture, Campylobacter 07/24/2020    Final    Culture, Salmonella and Shigella 07/24/2020    Final    Ova and Parasites, Stool Conc and * 07/24/2020    Final    FECAL LEUKOCYTE STAIN 07/24/2020    Final    C DIFF TOXIN/GDH W/REFLEX TO PCR 07/24/2020    Final   Office Visit on 07/21/2020   Component Date Value Ref Range Status    WBC 07/21/2020 8.1  3.8 - 10.8 Thousand/uL Final    RBC 07/21/2020 5.03  3.80 - 5.10 Million/uL Final    Hemoglobin 07/21/2020 14.5  11.7 - 15.5 g/dL Final    Hematocrit 07/21/2020 43.0  35.0 - 45.0 % Final    Mean Corpuscular Volume 07/21/2020 85.5  80.0 - 100.0 fL Final    Mean Corpuscular Hemoglobin 07/21/2020 28.8  27.0 - 33.0 pg Final    Mean Corpuscular Hemoglobin Conc 07/21/2020 33.7  32.0 - 36.0 g/dL Final    RDW 07/21/2020 12.7  11.0 - 15.0 % Final    Platelets 07/21/2020 343  140 - 400  Thousand/uL Final    MPV 07/21/2020 11.3  7.5 - 12.5 fL Final    Neutrophils Absolute 07/21/2020 3,904  1,500 - 7,800 cells/uL Final    Lymph # 07/21/2020 3,046  850 - 3,900 cells/uL Final    Mono # 07/21/2020 583  200 - 950 cells/uL Final    Eos # 07/21/2020 510* 15 - 500 cells/uL Final    Baso # 07/21/2020 57  0 - 200 cells/uL Final    Neutrophils Relative 07/21/2020 48.2  % Final    Lymph% 07/21/2020 37.6  % Final    Mono% 07/21/2020 7.2  % Final    Eosinophil% 07/21/2020 6.3  % Final    Basophil% 07/21/2020 0.7  % Final    Amylase 07/21/2020 15* 21 - 101 U/L Final    Lipase 07/21/2020 37  7 - 60 U/L Final    Glucose 07/21/2020 81  65 - 139 mg/dL Final    BUN, Bld 07/21/2020 12  7 - 25 mg/dL Final    Creatinine 07/21/2020 0.69  0.50 - 1.10 mg/dL Final    eGFR if non African American 07/21/2020 118  > OR = 60 mL/min/1.73m2 Final    eGFR if African American 07/21/2020 136  > OR = 60 mL/min/1.73m2 Final    BUN/Creatinine Ratio 07/21/2020 NOT APPLICABLE  6 - 22 (calc) Final    Sodium 07/21/2020 140  135 - 146 mmol/L Final    Potassium 07/21/2020 4.2  3.5 - 5.3 mmol/L Final    Chloride 07/21/2020 104  98 - 110 mmol/L Final    CO2 07/21/2020 26  20 - 32 mmol/L Final    Calcium 07/21/2020 10.0  8.6 - 10.2 mg/dL Final    Total Protein 07/21/2020 7.5  6.1 - 8.1 g/dL Final    Albumin 07/21/2020 4.8  3.6 - 5.1 g/dL Final    Globulin, Total 07/21/2020 2.7  1.9 - 3.7 g/dL (calc) Final    Albumin/Globulin Ratio 07/21/2020 1.8  1.0 - 2.5 (calc) Final    Total Bilirubin 07/21/2020 0.7  0.2 - 1.2 mg/dL Final    Alkaline Phosphatase 07/21/2020 75  31 - 125 U/L Final    AST 07/21/2020 65* 10 - 30 U/L Final    ALT 07/21/2020 131* 6 - 29 U/L Final    POC Blood, Urine 07/21/2020 Positive* Negative Final    POC Bilirubin, Urine 07/21/2020 Negative  Negative Final    POC Urobilinogen, Urine 07/21/2020 -  0.1 - 1.1 Final    POC Ketones, Urine 07/21/2020 Negative  Negative Final    POC Protein, Urine  07/21/2020 Negative  Negative Final    POC Nitrates, Urine 07/21/2020 Negative  Negative Final    POC Glucose, Urine 07/21/2020 Negative  Negative Final    pH, UA 07/21/2020 5.5   Final    POC Specific Gravity, Urine 07/21/2020 1.030* 1.003 - 1.029 Final    POC Leukocytes, Urine 07/21/2020 Negative  Negative Final   Orders Only on 06/29/2020   Component Date Value Ref Range Status    AM Cortisol 06/30/2020 6.6  6.2 - 19.4 ug/dL Final    LH 06/30/2020 12.8  mIU/mL Final    Follicle Stimulating Hormone 06/30/2020 4.9  mIU/mL Final    Estradiol 06/30/2020 44.3  pg/mL Final   Orders Only on 06/24/2020   Component Date Value Ref Range Status    Salivary Cortisol, MS 07/07/2020 0.022  ug/dL Final   There may be more visits with results that are not included.       Past Medical History:   Diagnosis Date    Anxiety     MRSA pneumonia     Pneumonia      Social History     Socioeconomic History    Marital status:      Spouse name: Not on file    Number of children: Not on file    Years of education: Not on file    Highest education level: Not on file   Occupational History    Not on file   Social Needs    Financial resource strain: Not on file    Food insecurity     Worry: Not on file     Inability: Not on file    Transportation needs     Medical: Not on file     Non-medical: Not on file   Tobacco Use    Smoking status: Never Smoker    Smokeless tobacco: Never Used   Substance and Sexual Activity    Alcohol use: Yes     Comment: socially    Drug use: No    Sexual activity: Yes     Partners: Male     Birth control/protection: None   Lifestyle    Physical activity     Days per week: Not on file     Minutes per session: Not on file    Stress: Not on file   Relationships    Social connections     Talks on phone: Not on file     Gets together: Not on file     Attends Christian service: Not on file     Active member of club or organization: Not on file     Attends meetings of clubs or  organizations: Not on file     Relationship status: Not on file   Other Topics Concern    Not on file   Social History Narrative    Plans from [facility]         GYN Visit & History [facility]1/7/2019     Obesity    PCOS        Visit Summary:    Down 5 pounds    UDS/ reviewed    Reviewed labs with patient    Discussed diet and exercise        Prescriptions:    SIG: metformin 500 mg oral tablet, 30 days, Dispense #120 Tablet, 5 Refills    Directions: take once daily for 2 weeks then increase to BID for 2 weeks, then increase to 2 po BID    SIG: phentermine 37.5 mg oral tablet, 30 days, Dispense #30 Tablet, 0 Refills    Directions: Take 1 oral tablet once a day        Plan:    Return for follow up appointment in 1 month        Nancy Saravia NP     GYN Visit & History [facility]0/2/2019     Obesity    Irregular menses    hx of ovarian cysts        Visit Summary:    PCOS panel 1 week prior to return appointment    UDS/ reviewed    Discussed diet and exercise at length        Prescriptions:    SIG: phentermine 37.5 mg oral tablet, 30 days, Dispense #30 Tablet, 0 Refills    Directions: Take 1 oral tablet once a day    SIG: Prenatal 28 mg iron- 800 mcg oral tablet, 30 days, Dispense #30 Tablet, 0 Refills    Directions: Take 1 oral tablet once a day        Plan:    Return for follow up appointment in1 month    Weight loss goal set for 8-10 pounds        Nancy Saravia NP     GYN Exam (Annual/6Wk PP)[facility]2/13/2016     Annual    Depression/Anxiety    Obesity    Break-through bleeding with OCP        Visit Summary    Ordered:    Blood Drawing    Pap IG, Ct-Ng, rfx HPV ASCU    CBC With Differential/Platelet    Glucose, Serum    Lipid Panel    UA w/o Micro: within normal limits    UPT: negative        Prescriptions:    SIG: NuvaRing 0.12-0.015 mg/24 hr ring,  days, Dispense #1 Ring, 3 Refills    Directions: one ring vaginally x 21 days, then remove and discard. Leave out for 7 days, then insert new ring.    Zoloft  working for depression management.    Pt has a PCP for depression management, discussed counseling, exercise etc.        Return for follow up appointment in one year or prn.     [id number]     [facility] k pneumonia. w same sensitivies as k pneumonia uti in .........gb us nl...renal us nl....co persisatnt pp...and frequency and urgency and nocgturia.....modeerat...x 1 y        a-relapsing k npuemonia uti,,..diarrhea x 1 mo            p-cipro 500 mg bid x 10 d...rtc 3 w for repaeat ucs and if still [prese nt will refer to dr mccallum......stool for ova and parasite and c deficil     GYN Visit & [id number] [facility]2016     25 yo........daily cramping in pelvis ...moderate....intermittendt and daily....duration 1 h....not worse w activity...u/s nl...on ocp.....associated w dysmen...requires advil and improves w nsaia....no dysp...no dysuria....frequency and urgency..x 1 yr, not worsening, but w apin associated with right cvat....h/o kleb uti ......sedc rate 14 on ....bm 2-4x/d, x 2 mos....sp cs...appy....ho mrsa pneumonia w intubation and blood transfusion 2 yrs....        a-pelvic p[ain, dysmenorrhea, urinary frequency and urgency....poss renal etiology...r/o gb and renal dis        p-renal and gb us...ucs.....rtc 1 wk.....consider dx lap     GYN Visit & History2016     Pelvic pain: intermittent    Contraceptive counseling    urinary tract infection        Plan: Finish Keflex.    Pt did not take doxy as prescribed.    Recommended to take the full 10 days.    Reviewed u/s and labs with patient.    Track pain, continue OCP.    Return for follow up appointment in 2 months for follow up with Dr Horan or sooner if needed.    Terazol esent if needed for yeast r/t prolonged antibiotic use.     GYN Visit & History2016     25 yo........pp x 2mos.....ocp 2-3 wk....cramping w sharp exacerbagtion...right great than left...no n/v...post coital pelvic pain....fever x 1 d...pos appy 10  yr ago        a-pelvic pain....vag dc....        p-gc chl affirm ucs cbc sed rate...doxy 100 bid x 10...us....rtc 1 w     GYN Exam (Annual/6Wk PP)1012/9/2015     Annual    Obesity        Plan: Pap with gc/ct    Adipex refilled    Zyrtec refilled.    Diet and exercise discussed.    Return for follow up appointment one year or prn.     GYN Visit & F/U1/6/2015     Endometritis, resolved.  Obesity.  Family history of breast cancer.    Bilateral breast ultrasound at [facility].    Prescription for Contrave.    Return to clinic in one month.     GYN Exam (Annual/6Wk PP)1012/11/2014     Annual exam.  Contraceptive management.  Strong family history of breast cancer.  Right breast cyst.  Endometritis.    CBC and sed.  Rate today.    Change birth control to Mircette 1 by mouth daily.    Doxycycline and Flagyl x2 weeks.    Ultrasound with radiology of bilateral breasts.    BRCA gene testing.     GYN Visit & F/U9/9/2014     Irregular menstrual cycle        Plan: Beta today    If starts menses, restart OCP.    Condom use.    Return for follow up appointment prn pending results.     GYN Visit & F/U5/5/2014     Yeast vulvovaginitis    Recent MRSA pneumonia, S/P 3 week ICU stay        Plan: Diflucan 150mg daily #1    Mycolog ointment esent    Continue PT and follow up with PCP as scheduled    Return for follow up appointment prn.     GYN Visit & F/U1/31/2014     Bacterial Vaginosis        Plan: Flagyl 500mg I PO BID, pt states she does have rx at home    Repeat diflucan.    Pt has no insurance at this time.        Return for follow up appointment prn.     GYN Visit & F/U11/19/2013     co uri...on tricycle....        o- heent adenopathy.....cx clean        a-sp luz maria 1 ..uri        p-pap...amp 500 tid.....rtc 6 mo p/p....     GYN Visit & F/U5/7/2013     cx bx hpv w/o dysplasia.....no tob...rtc 6 mos pap...Gardasil at hd     DAY GYN Visit + History4/22/2013     colpo-prob luz maria 2.....bx at 600/900.........rtc 1 wk     Past  Surgical History:   Procedure Laterality Date    APPENDECTOMY      BRONCHOSCOPY       SECTION, LOW TRANSVERSE      DILATION AND CURETTAGE OF UTERUS N/A 2020    Procedure: DILATION AND CURETTAGE, UTERUS;  Surgeon: Srinivasan Stewart MD;  Location: [facility] OR;  Service: OB/GYN;  Laterality: N/A;    HYSTEROSCOPIC POLYPECTOMY OF UTERUS N/A 2020    Procedure: POLYPECTOMY, UTERUS, HYSTEROSCOPIC;  Surgeon: Srinivasan Stewart MD;  Location: [facility] OR;  Service: OB/GYN;  Laterality: N/A;    HYSTEROSCOPY N/A 2020    Procedure: HYSTEROSCOPY;  Surgeon: Srinivasan Stewart MD;  Location: [facility] OR;  Service: OB/GYN;  Laterality: N/A;    TONSILLECTOMY       Family History   Problem Relation Age of Onset    COPD Mother     Asthma Mother     Heart disease Mother     Heart disease Brother     Heart disease Maternal Grandfather     Diabetes Paternal Grandmother     Heart disease Paternal Grandmother     Breast cancer Paternal Aunt     Breast cancer Paternal Aunt     Breast cancer Paternal Cousin     Immunodeficiency Neg Hx     Eczema Neg Hx     Rhinitis Neg Hx        Review of patient's allergies indicates:   Allergen Reactions    Azithromycin Hives    Bactrim [sulfamethoxazole-trimethoprim]      Rash     Ciprofloxacin      Doesn't remember ? Rash     Naltrexone-bupropion Nausea And Vomiting and Other (See Comments)     hands and face numbness    Penicillins Rash     Tiny raised bumps. Not urticarial- 10 years.        Current Outpatient Medications:     albuterol sulfate (PROAIR HFA INHL), Inhale 1 puff into the lungs every 4 to 6 hours as needed. , Disp: , Rfl:     butalbital-acetaminophen-caffeine -40 mg (FIORICET, ESGIC) -40 mg per tablet, Take 1 tablet by mouth every 4 (four) hours as needed for Headaches., Disp: 20 tablet, Rfl: 1    cetirizine (ZYRTEC) 10 MG tablet, Take 10 mg by mouth once daily., Disp: , Rfl:     clonazePAM (KLONOPIN) 0.5 MG tablet, Take 1 tablet (0.5  "mg total) by mouth 2 (two) times daily as needed for Anxiety., Disp: 60 tablet, Rfl: 1    fluticasone (FLONASE) 50 mcg/actuation nasal spray, 1 spray by Each Nare route once daily., Disp: , Rfl:     sertraline (ZOLOFT) 100 MG tablet, Take 1 tablet (100 mg total) by mouth once daily., Disp: 90 tablet, Rfl: 1    thyroid, pork, (ARMOUR THYROID) 15 mg Tab, Take 1 tablet (15 mg total) by mouth before breakfast., Disp: 30 tablet, Rfl: 1    folic acid (FOLVITE) 1 MG tablet, Take 4 tablets (4 mg total) by mouth once daily. (Patient not taking: Reported on 10/19/2020), Disp: 360 tablet, Rfl: 3    Review of Systems   Constitutional: Negative for chills, fever and unexpected weight change.   HENT: Negative for sore throat.    Eyes: Negative for pain and visual disturbance.   Respiratory: Negative for cough and shortness of breath.    Cardiovascular: Negative for chest pain, palpitations and leg swelling.   Gastrointestinal: Negative for abdominal pain, diarrhea, nausea and vomiting.   Genitourinary: Negative for difficulty urinating, hematuria and vaginal bleeding.   Musculoskeletal: Negative for arthralgias.   Skin: Negative for rash.   Neurological: Negative for dizziness, weakness and headaches.   Psychiatric/Behavioral: Negative for agitation and sleep disturbance. The patient is nervous/anxious.           Objective:      Vitals:    10/19/20 1134   BP: 118/82   Pulse: 72   Temp: 97.8 °F (36.6 °C)   Weight: 100.7 kg (222 lb)   Height: 5' 6" (1.676 m)     Physical Exam  Constitutional:       Appearance: She is well-developed.   Neck:      Musculoskeletal: Normal range of motion and neck supple.      Vascular: No JVD.   Cardiovascular:      Rate and Rhythm: Normal rate and regular rhythm.      Heart sounds: No murmur.   Pulmonary:      Effort: Pulmonary effort is normal.      Breath sounds: Normal breath sounds.   Abdominal:      General: Bowel sounds are normal.      Palpations: Abdomen is soft.   Musculoskeletal: Normal " range of motion.         General: No deformity.   Lymphadenopathy:      Cervical: No cervical adenopathy.   Skin:     General: Skin is warm and dry.      Findings: No rash.   Neurological:      Mental Status: She is alert and oriented to person, place, and time.      Gait: Gait normal.   Psychiatric:         Speech: Speech normal.         Behavior: Behavior normal.           Assessment:       1. Anxiety    2. Recurrent major depressive disorder, in full remission    3. Seasonal allergies    4. Chronic tension-type headache, not intractable    5. PCOS (polycystic ovarian syndrome)    6. High risk medication use         Plan:       Anxiety    Recurrent major depressive disorder, in full remission  -     sertraline (ZOLOFT) 100 MG tablet; Take 1 tablet (100 mg total) by mouth once daily.  Dispense: 90 tablet; Refill: 1    Seasonal allergies    Chronic tension-type headache, not intractable    PCOS (polycystic ovarian syndrome)    High risk medication use      Follow up in about 6 months (around 4/19/2021) for medication management.        10/22/2020 Peace Gray

## 2020-10-21 RX ORDER — SERTRALINE HYDROCHLORIDE 100 MG/1
100 TABLET, FILM COATED ORAL DAILY
Qty: 90 TABLET | Refills: 1 | Status: SHIPPED | OUTPATIENT
Start: 2020-10-21 | End: 2021-06-30 | Stop reason: SDUPTHER

## 2020-12-11 ENCOUNTER — PATIENT MESSAGE (OUTPATIENT)
Dept: FAMILY MEDICINE | Facility: CLINIC | Age: 30
End: 2020-12-11

## 2020-12-11 DIAGNOSIS — F41.9 ANXIETY: ICD-10-CM

## 2020-12-11 RX ORDER — CLONAZEPAM 0.5 MG/1
0.5 TABLET ORAL 2 TIMES DAILY PRN
Qty: 60 TABLET | Refills: 1 | Status: SHIPPED | OUTPATIENT
Start: 2020-12-11 | End: 2021-06-30 | Stop reason: SDUPTHER

## 2021-01-05 ENCOUNTER — TELEPHONE (OUTPATIENT)
Dept: FAMILY MEDICINE | Facility: CLINIC | Age: 31
End: 2021-01-05

## 2021-01-05 DIAGNOSIS — R74.8 ABNORMAL LIVER ENZYMES: Primary | ICD-10-CM

## 2021-01-05 DIAGNOSIS — Z79.899 HIGH RISK MEDICATION USE: ICD-10-CM

## 2021-01-16 LAB
ALBUMIN SERPL-MCNC: 4.5 G/DL (ref 3.6–5.1)
ALBUMIN/GLOB SERPL: 1.7 (CALC) (ref 1–2.5)
ALP SERPL-CCNC: 67 U/L (ref 31–125)
ALT SERPL-CCNC: 66 U/L (ref 6–29)
AST SERPL-CCNC: 34 U/L (ref 10–30)
BILIRUB SERPL-MCNC: 0.6 MG/DL (ref 0.2–1.2)
BUN SERPL-MCNC: 12 MG/DL (ref 7–25)
BUN/CREAT SERPL: ABNORMAL (CALC) (ref 6–22)
CALCIUM SERPL-MCNC: 9.9 MG/DL (ref 8.6–10.2)
CHLORIDE SERPL-SCNC: 104 MMOL/L (ref 98–110)
CO2 SERPL-SCNC: 29 MMOL/L (ref 20–32)
CREAT SERPL-MCNC: 0.66 MG/DL (ref 0.5–1.1)
GFRSERPLBLD MDRD-ARVRAT: 119 ML/MIN/1.73M2
GLOBULIN SER CALC-MCNC: 2.6 G/DL (CALC) (ref 1.9–3.7)
GLUCOSE SERPL-MCNC: 86 MG/DL (ref 65–99)
POTASSIUM SERPL-SCNC: 4.3 MMOL/L (ref 3.5–5.3)
PROT SERPL-MCNC: 7.1 G/DL (ref 6.1–8.1)
SODIUM SERPL-SCNC: 140 MMOL/L (ref 135–146)

## 2021-01-18 ENCOUNTER — PATIENT MESSAGE (OUTPATIENT)
Dept: FAMILY MEDICINE | Facility: CLINIC | Age: 31
End: 2021-01-18

## 2021-01-18 DIAGNOSIS — R74.8 ABNORMAL LIVER ENZYMES: Primary | ICD-10-CM

## 2021-02-03 ENCOUNTER — HOSPITAL ENCOUNTER (OUTPATIENT)
Dept: RADIOLOGY | Facility: HOSPITAL | Age: 31
Discharge: HOME | End: 2021-02-03
Attending: NURSE PRACTITIONER
Payer: COMMERCIAL

## 2021-02-03 ENCOUNTER — TELEPHONE (OUTPATIENT)
Dept: FAMILY MEDICINE | Facility: CLINIC | Age: 31
End: 2021-02-03

## 2021-02-03 DIAGNOSIS — R74.8 ABNORMAL LIVER ENZYMES: ICD-10-CM

## 2021-02-03 PROCEDURE — 76700 US EXAM ABDOM COMPLETE: CPT | Mod: TC,PO

## 2021-05-14 ENCOUNTER — OFFICE VISIT (OUTPATIENT)
Dept: FAMILY MEDICINE | Facility: CLINIC | Age: 31
End: 2021-05-14
Payer: COMMERCIAL

## 2021-05-14 VITALS
SYSTOLIC BLOOD PRESSURE: 118 MMHG | BODY MASS INDEX: 37.28 KG/M2 | HEART RATE: 89 BPM | WEIGHT: 232 LBS | HEIGHT: 66 IN | OXYGEN SATURATION: 99 % | DIASTOLIC BLOOD PRESSURE: 86 MMHG | TEMPERATURE: 98 F

## 2021-05-14 DIAGNOSIS — J32.9 SINUSITIS, UNSPECIFIED CHRONICITY, UNSPECIFIED LOCATION: Primary | ICD-10-CM

## 2021-05-14 PROCEDURE — 99213 OFFICE O/P EST LOW 20 MIN: CPT | Mod: 25,S$GLB,, | Performed by: PHYSICIAN ASSISTANT

## 2021-05-14 PROCEDURE — 96372 THER/PROPH/DIAG INJ SC/IM: CPT | Mod: S$GLB,,, | Performed by: PHYSICIAN ASSISTANT

## 2021-05-14 PROCEDURE — 96372 PR INJECTION,THERAP/PROPH/DIAG2ST, IM OR SUBCUT: ICD-10-PCS | Mod: S$GLB,,, | Performed by: PHYSICIAN ASSISTANT

## 2021-05-14 PROCEDURE — 99213 PR OFFICE/OUTPT VISIT, EST, LEVL III, 20-29 MIN: ICD-10-PCS | Mod: 25,S$GLB,, | Performed by: PHYSICIAN ASSISTANT

## 2021-05-14 RX ORDER — DEXAMETHASONE SODIUM PHOSPHATE 4 MG/ML
8 INJECTION, SOLUTION INTRA-ARTICULAR; INTRALESIONAL; INTRAMUSCULAR; INTRAVENOUS; SOFT TISSUE ONCE
Status: COMPLETED | OUTPATIENT
Start: 2021-05-14 | End: 2021-05-14

## 2021-05-14 RX ORDER — LEVOTHYROXINE SODIUM 100 UG/1
100 TABLET ORAL DAILY
COMMUNITY
Start: 2021-02-22 | End: 2021-11-03 | Stop reason: SDUPTHER

## 2021-05-14 RX ORDER — DOXYCYCLINE 100 MG/1
100 CAPSULE ORAL 2 TIMES DAILY
Qty: 20 CAPSULE | Refills: 0 | Status: SHIPPED | OUTPATIENT
Start: 2021-05-14 | End: 2021-05-24

## 2021-05-14 RX ADMIN — DEXAMETHASONE SODIUM PHOSPHATE 8 MG: 4 INJECTION, SOLUTION INTRA-ARTICULAR; INTRALESIONAL; INTRAMUSCULAR; INTRAVENOUS; SOFT TISSUE at 11:05

## 2021-05-19 ENCOUNTER — PATIENT MESSAGE (OUTPATIENT)
Dept: FAMILY MEDICINE | Facility: CLINIC | Age: 31
End: 2021-05-19

## 2021-05-29 ENCOUNTER — PATIENT MESSAGE (OUTPATIENT)
Dept: FAMILY MEDICINE | Facility: CLINIC | Age: 31
End: 2021-05-29

## 2021-06-21 ENCOUNTER — PATIENT MESSAGE (OUTPATIENT)
Dept: FAMILY MEDICINE | Facility: CLINIC | Age: 31
End: 2021-06-21

## 2021-06-30 ENCOUNTER — OFFICE VISIT (OUTPATIENT)
Dept: FAMILY MEDICINE | Facility: CLINIC | Age: 31
End: 2021-06-30
Payer: COMMERCIAL

## 2021-06-30 VITALS
WEIGHT: 233.63 LBS | HEIGHT: 66 IN | SYSTOLIC BLOOD PRESSURE: 122 MMHG | BODY MASS INDEX: 37.55 KG/M2 | DIASTOLIC BLOOD PRESSURE: 84 MMHG | HEART RATE: 84 BPM

## 2021-06-30 DIAGNOSIS — F41.9 ANXIETY: ICD-10-CM

## 2021-06-30 DIAGNOSIS — F33.1 MODERATE EPISODE OF RECURRENT MAJOR DEPRESSIVE DISORDER: ICD-10-CM

## 2021-06-30 DIAGNOSIS — R35.0 URINARY FREQUENCY: Primary | ICD-10-CM

## 2021-06-30 PROCEDURE — 99214 PR OFFICE/OUTPT VISIT, EST, LEVL IV, 30-39 MIN: ICD-10-PCS | Mod: S$GLB,,, | Performed by: NURSE PRACTITIONER

## 2021-06-30 PROCEDURE — 99214 OFFICE O/P EST MOD 30 MIN: CPT | Mod: S$GLB,,, | Performed by: NURSE PRACTITIONER

## 2021-06-30 PROCEDURE — 3008F PR BODY MASS INDEX (BMI) DOCUMENTED: ICD-10-PCS | Mod: S$GLB,,, | Performed by: NURSE PRACTITIONER

## 2021-06-30 PROCEDURE — 3008F BODY MASS INDEX DOCD: CPT | Mod: S$GLB,,, | Performed by: NURSE PRACTITIONER

## 2021-06-30 RX ORDER — SERTRALINE HYDROCHLORIDE 100 MG/1
100 TABLET, FILM COATED ORAL DAILY
Qty: 90 TABLET | Refills: 1 | Status: SHIPPED | OUTPATIENT
Start: 2021-06-30 | End: 2021-06-30 | Stop reason: SDUPTHER

## 2021-06-30 RX ORDER — VITAMIN E 268 MG
400 CAPSULE ORAL DAILY
COMMUNITY

## 2021-06-30 RX ORDER — LETROZOLE 2.5 MG/1
TABLET, FILM COATED ORAL
COMMUNITY
Start: 2021-06-01 | End: 2023-01-11

## 2021-06-30 RX ORDER — SERTRALINE HYDROCHLORIDE 100 MG/1
200 TABLET, FILM COATED ORAL DAILY
Qty: 180 TABLET | Refills: 1 | Status: SHIPPED | OUTPATIENT
Start: 2021-06-30 | End: 2022-01-14 | Stop reason: SDUPTHER

## 2021-06-30 RX ORDER — CLONAZEPAM 0.5 MG/1
0.5 TABLET ORAL 2 TIMES DAILY PRN
Qty: 60 TABLET | Refills: 1 | Status: SHIPPED | OUTPATIENT
Start: 2021-06-30 | End: 2022-03-29 | Stop reason: SDUPTHER

## 2021-06-30 RX ORDER — MAGNESIUM 30 MG
TABLET ORAL ONCE
COMMUNITY
End: 2023-01-11

## 2021-06-30 RX ORDER — ASPIRIN 81 MG/1
81 TABLET ORAL DAILY
COMMUNITY

## 2021-06-30 RX ORDER — PHENYLEPHRINE HCL 10 MG
500 TABLET ORAL DAILY
COMMUNITY

## 2021-07-02 LAB — BACTERIA UR CULT: NORMAL

## 2021-07-06 ENCOUNTER — PATIENT MESSAGE (OUTPATIENT)
Dept: FAMILY MEDICINE | Facility: CLINIC | Age: 31
End: 2021-07-06

## 2021-07-06 DIAGNOSIS — F33.42 RECURRENT MAJOR DEPRESSIVE DISORDER, IN FULL REMISSION: Primary | ICD-10-CM

## 2021-07-15 ENCOUNTER — PATIENT MESSAGE (OUTPATIENT)
Dept: FAMILY MEDICINE | Facility: CLINIC | Age: 31
End: 2021-07-15

## 2021-07-28 ENCOUNTER — PATIENT MESSAGE (OUTPATIENT)
Dept: FAMILY MEDICINE | Facility: CLINIC | Age: 31
End: 2021-07-28

## 2021-07-30 ENCOUNTER — PATIENT MESSAGE (OUTPATIENT)
Dept: FAMILY MEDICINE | Facility: CLINIC | Age: 31
End: 2021-07-30

## 2021-08-07 ENCOUNTER — PATIENT MESSAGE (OUTPATIENT)
Dept: FAMILY MEDICINE | Facility: CLINIC | Age: 31
End: 2021-08-07

## 2021-10-22 ENCOUNTER — OFFICE VISIT (OUTPATIENT)
Dept: FAMILY MEDICINE | Facility: CLINIC | Age: 31
End: 2021-10-22
Payer: COMMERCIAL

## 2021-10-22 ENCOUNTER — TELEPHONE (OUTPATIENT)
Dept: FAMILY MEDICINE | Facility: CLINIC | Age: 31
End: 2021-10-22

## 2021-10-22 VITALS
WEIGHT: 230 LBS | HEIGHT: 66 IN | DIASTOLIC BLOOD PRESSURE: 86 MMHG | SYSTOLIC BLOOD PRESSURE: 128 MMHG | HEART RATE: 90 BPM | BODY MASS INDEX: 36.96 KG/M2

## 2021-10-22 DIAGNOSIS — E28.2 PCOS (POLYCYSTIC OVARIAN SYNDROME): ICD-10-CM

## 2021-10-22 DIAGNOSIS — F33.42 RECURRENT MAJOR DEPRESSIVE DISORDER, IN FULL REMISSION: ICD-10-CM

## 2021-10-22 DIAGNOSIS — S16.1XXA STRAIN OF NECK MUSCLE, INITIAL ENCOUNTER: Primary | ICD-10-CM

## 2021-10-22 PROCEDURE — 3074F SYST BP LT 130 MM HG: CPT | Mod: S$GLB,,, | Performed by: FAMILY MEDICINE

## 2021-10-22 PROCEDURE — 1160F RVW MEDS BY RX/DR IN RCRD: CPT | Mod: S$GLB,,, | Performed by: FAMILY MEDICINE

## 2021-10-22 PROCEDURE — 1160F PR REVIEW ALL MEDS BY PRESCRIBER/CLIN PHARMACIST DOCUMENTED: ICD-10-PCS | Mod: S$GLB,,, | Performed by: FAMILY MEDICINE

## 2021-10-22 PROCEDURE — 3074F PR MOST RECENT SYSTOLIC BLOOD PRESSURE < 130 MM HG: ICD-10-PCS | Mod: S$GLB,,, | Performed by: FAMILY MEDICINE

## 2021-10-22 PROCEDURE — 99213 OFFICE O/P EST LOW 20 MIN: CPT | Mod: S$GLB,,, | Performed by: FAMILY MEDICINE

## 2021-10-22 PROCEDURE — 3079F PR MOST RECENT DIASTOLIC BLOOD PRESSURE 80-89 MM HG: ICD-10-PCS | Mod: S$GLB,,, | Performed by: FAMILY MEDICINE

## 2021-10-22 PROCEDURE — 3008F PR BODY MASS INDEX (BMI) DOCUMENTED: ICD-10-PCS | Mod: S$GLB,,, | Performed by: FAMILY MEDICINE

## 2021-10-22 PROCEDURE — 99213 PR OFFICE/OUTPT VISIT, EST, LEVL III, 20-29 MIN: ICD-10-PCS | Mod: S$GLB,,, | Performed by: FAMILY MEDICINE

## 2021-10-22 PROCEDURE — 3079F DIAST BP 80-89 MM HG: CPT | Mod: S$GLB,,, | Performed by: FAMILY MEDICINE

## 2021-10-22 PROCEDURE — 3008F BODY MASS INDEX DOCD: CPT | Mod: S$GLB,,, | Performed by: FAMILY MEDICINE

## 2021-10-22 RX ORDER — CHOLECALCIFEROL (VITAMIN D3) 25 MCG
1000 TABLET ORAL DAILY
COMMUNITY

## 2021-10-22 RX ORDER — LEVOCETIRIZINE DIHYDROCHLORIDE 5 MG/1
1 TABLET, FILM COATED ORAL DAILY
COMMUNITY
End: 2023-01-17 | Stop reason: SDUPTHER

## 2021-10-22 RX ORDER — METHYLPREDNISOLONE 4 MG/1
TABLET ORAL
Qty: 1 EACH | Refills: 0 | Status: SHIPPED | OUTPATIENT
Start: 2021-10-22 | End: 2021-11-12

## 2021-10-22 RX ORDER — CYCLOBENZAPRINE HCL 10 MG
10 TABLET ORAL NIGHTLY
Qty: 15 TABLET | Refills: 0 | Status: SHIPPED | OUTPATIENT
Start: 2021-10-22 | End: 2021-11-01

## 2021-10-31 ENCOUNTER — OFFICE VISIT (OUTPATIENT)
Dept: URGENT CARE | Facility: CLINIC | Age: 31
End: 2021-10-31
Payer: COMMERCIAL

## 2021-10-31 VITALS
BODY MASS INDEX: 36.96 KG/M2 | HEART RATE: 100 BPM | DIASTOLIC BLOOD PRESSURE: 87 MMHG | SYSTOLIC BLOOD PRESSURE: 118 MMHG | WEIGHT: 230 LBS | TEMPERATURE: 98 F | RESPIRATION RATE: 16 BRPM | HEIGHT: 66 IN | OXYGEN SATURATION: 96 %

## 2021-10-31 DIAGNOSIS — R00.2 PALPITATIONS: ICD-10-CM

## 2021-10-31 DIAGNOSIS — R00.0 TACHYCARDIA: Primary | ICD-10-CM

## 2021-10-31 PROCEDURE — 99213 PR OFFICE/OUTPT VISIT, EST, LEVL III, 20-29 MIN: ICD-10-PCS | Mod: S$GLB,,, | Performed by: NURSE PRACTITIONER

## 2021-10-31 PROCEDURE — 99213 OFFICE O/P EST LOW 20 MIN: CPT | Mod: S$GLB,,, | Performed by: NURSE PRACTITIONER

## 2021-10-31 PROCEDURE — 93000 ELECTROCARDIOGRAM COMPLETE: CPT | Mod: S$GLB,,, | Performed by: NURSE PRACTITIONER

## 2021-10-31 PROCEDURE — 93000 PR ELECTROCARDIOGRAM, COMPLETE: ICD-10-PCS | Mod: S$GLB,,, | Performed by: NURSE PRACTITIONER

## 2021-11-01 ENCOUNTER — TELEPHONE (OUTPATIENT)
Dept: FAMILY MEDICINE | Facility: CLINIC | Age: 31
End: 2021-11-01
Payer: COMMERCIAL

## 2021-11-03 ENCOUNTER — OFFICE VISIT (OUTPATIENT)
Dept: FAMILY MEDICINE | Facility: CLINIC | Age: 31
End: 2021-11-03
Payer: COMMERCIAL

## 2021-11-03 VITALS
SYSTOLIC BLOOD PRESSURE: 126 MMHG | WEIGHT: 230 LBS | HEART RATE: 76 BPM | HEIGHT: 66 IN | DIASTOLIC BLOOD PRESSURE: 84 MMHG | BODY MASS INDEX: 36.96 KG/M2

## 2021-11-03 DIAGNOSIS — F41.9 ANXIETY: ICD-10-CM

## 2021-11-03 DIAGNOSIS — Z79.899 ENCOUNTER FOR LONG-TERM (CURRENT) USE OF OTHER MEDICATIONS: ICD-10-CM

## 2021-11-03 DIAGNOSIS — R00.2 PALPITATIONS: Primary | ICD-10-CM

## 2021-11-03 DIAGNOSIS — F33.42 RECURRENT MAJOR DEPRESSIVE DISORDER, IN FULL REMISSION: ICD-10-CM

## 2021-11-03 PROCEDURE — 99214 OFFICE O/P EST MOD 30 MIN: CPT | Mod: S$GLB,,, | Performed by: PHYSICIAN ASSISTANT

## 2021-11-03 PROCEDURE — 99214 PR OFFICE/OUTPT VISIT, EST, LEVL IV, 30-39 MIN: ICD-10-PCS | Mod: S$GLB,,, | Performed by: PHYSICIAN ASSISTANT

## 2021-11-03 PROCEDURE — 3079F DIAST BP 80-89 MM HG: CPT | Mod: S$GLB,,, | Performed by: PHYSICIAN ASSISTANT

## 2021-11-03 PROCEDURE — 1160F PR REVIEW ALL MEDS BY PRESCRIBER/CLIN PHARMACIST DOCUMENTED: ICD-10-PCS | Mod: S$GLB,,, | Performed by: PHYSICIAN ASSISTANT

## 2021-11-03 PROCEDURE — 3008F PR BODY MASS INDEX (BMI) DOCUMENTED: ICD-10-PCS | Mod: S$GLB,,, | Performed by: PHYSICIAN ASSISTANT

## 2021-11-03 PROCEDURE — 3074F SYST BP LT 130 MM HG: CPT | Mod: S$GLB,,, | Performed by: PHYSICIAN ASSISTANT

## 2021-11-03 PROCEDURE — 3079F PR MOST RECENT DIASTOLIC BLOOD PRESSURE 80-89 MM HG: ICD-10-PCS | Mod: S$GLB,,, | Performed by: PHYSICIAN ASSISTANT

## 2021-11-03 PROCEDURE — 3008F BODY MASS INDEX DOCD: CPT | Mod: S$GLB,,, | Performed by: PHYSICIAN ASSISTANT

## 2021-11-03 PROCEDURE — 3074F PR MOST RECENT SYSTOLIC BLOOD PRESSURE < 130 MM HG: ICD-10-PCS | Mod: S$GLB,,, | Performed by: PHYSICIAN ASSISTANT

## 2021-11-03 PROCEDURE — 1160F RVW MEDS BY RX/DR IN RCRD: CPT | Mod: S$GLB,,, | Performed by: PHYSICIAN ASSISTANT

## 2021-11-03 RX ORDER — ACETAMINOPHEN AND CODEINE PHOSPHATE 300; 30 MG/1; MG/1
1-2 TABLET ORAL
COMMUNITY
Start: 2021-10-29 | End: 2021-11-05

## 2021-11-03 RX ORDER — LEVOTHYROXINE SODIUM 100 UG/1
100 TABLET ORAL DAILY
Qty: 90 TABLET | Refills: 0 | Status: SHIPPED | OUTPATIENT
Start: 2021-11-03 | End: 2022-03-09 | Stop reason: SDUPTHER

## 2021-11-04 ENCOUNTER — TELEPHONE (OUTPATIENT)
Dept: FAMILY MEDICINE | Facility: CLINIC | Age: 31
End: 2021-11-04
Payer: COMMERCIAL

## 2021-11-04 LAB
ALBUMIN SERPL-MCNC: 4.4 G/DL (ref 3.6–5.1)
ALBUMIN/GLOB SERPL: 1.7 (CALC) (ref 1–2.5)
ALP SERPL-CCNC: 72 U/L (ref 31–125)
ALT SERPL-CCNC: 52 U/L (ref 6–29)
AST SERPL-CCNC: 25 U/L (ref 10–30)
BASOPHILS # BLD AUTO: 38 CELLS/UL (ref 0–200)
BASOPHILS NFR BLD AUTO: 0.5 %
BILIRUB SERPL-MCNC: 0.7 MG/DL (ref 0.2–1.2)
BUN SERPL-MCNC: 10 MG/DL (ref 7–25)
BUN/CREAT SERPL: ABNORMAL (CALC) (ref 6–22)
CALCIUM SERPL-MCNC: 9.6 MG/DL (ref 8.6–10.2)
CHLORIDE SERPL-SCNC: 104 MMOL/L (ref 98–110)
CHOLEST SERPL-MCNC: 177 MG/DL
CHOLEST/HDLC SERPL: 4.8 (CALC)
CO2 SERPL-SCNC: 24 MMOL/L (ref 20–32)
CREAT SERPL-MCNC: 0.67 MG/DL (ref 0.5–1.1)
EOSINOPHIL # BLD AUTO: 98 CELLS/UL (ref 15–500)
EOSINOPHIL NFR BLD AUTO: 1.3 %
ERYTHROCYTE [DISTWIDTH] IN BLOOD BY AUTOMATED COUNT: 12.6 % (ref 11–15)
GLOBULIN SER CALC-MCNC: 2.6 G/DL (CALC) (ref 1.9–3.7)
GLUCOSE SERPL-MCNC: 87 MG/DL (ref 65–99)
HBA1C MFR BLD: 5.5 % OF TOTAL HGB
HCT VFR BLD AUTO: 44.4 % (ref 35–45)
HDLC SERPL-MCNC: 37 MG/DL
HGB BLD-MCNC: 14.6 G/DL (ref 11.7–15.5)
LDLC SERPL CALC-MCNC: 111 MG/DL (CALC)
LYMPHOCYTES # BLD AUTO: 2963 CELLS/UL (ref 850–3900)
LYMPHOCYTES NFR BLD AUTO: 39.5 %
MCH RBC QN AUTO: 28.5 PG (ref 27–33)
MCHC RBC AUTO-ENTMCNC: 32.9 G/DL (ref 32–36)
MCV RBC AUTO: 86.7 FL (ref 80–100)
MONOCYTES # BLD AUTO: 473 CELLS/UL (ref 200–950)
MONOCYTES NFR BLD AUTO: 6.3 %
NEUTROPHILS # BLD AUTO: 3930 CELLS/UL (ref 1500–7800)
NEUTROPHILS NFR BLD AUTO: 52.4 %
NONHDLC SERPL-MCNC: 140 MG/DL (CALC)
PLATELET # BLD AUTO: 370 THOUSAND/UL (ref 140–400)
PMV BLD REES-ECKER: 11.2 FL (ref 7.5–12.5)
POTASSIUM SERPL-SCNC: 4.1 MMOL/L (ref 3.5–5.3)
PROT SERPL-MCNC: 7 G/DL (ref 6.1–8.1)
RBC # BLD AUTO: 5.12 MILLION/UL (ref 3.8–5.1)
SODIUM SERPL-SCNC: 140 MMOL/L (ref 135–146)
TRIGL SERPL-MCNC: 172 MG/DL
TSH SERPL-ACNC: 1.05 MIU/L
WBC # BLD AUTO: 7.5 THOUSAND/UL (ref 3.8–10.8)

## 2021-11-11 ENCOUNTER — CLINICAL SUPPORT (OUTPATIENT)
Dept: CARDIOLOGY | Facility: HOSPITAL | Age: 31
End: 2021-11-11
Attending: PHYSICIAN ASSISTANT
Payer: COMMERCIAL

## 2021-11-11 DIAGNOSIS — R00.2 PALPITATIONS: ICD-10-CM

## 2021-11-11 PROCEDURE — 93227 XTRNL ECG REC<48 HR R&I: CPT | Mod: ,,, | Performed by: INTERNAL MEDICINE

## 2021-11-11 PROCEDURE — 93226 XTRNL ECG REC<48 HR SCAN A/R: CPT

## 2021-11-11 PROCEDURE — 93227 HOLTER MONITOR - 48 HOUR (CUPID ONLY): ICD-10-PCS | Mod: ,,, | Performed by: INTERNAL MEDICINE

## 2021-11-17 ENCOUNTER — PATIENT MESSAGE (OUTPATIENT)
Dept: FAMILY MEDICINE | Facility: CLINIC | Age: 31
End: 2021-11-17
Payer: COMMERCIAL

## 2021-11-18 ENCOUNTER — TELEPHONE (OUTPATIENT)
Dept: CARDIOLOGY | Facility: CLINIC | Age: 31
End: 2021-11-18
Payer: COMMERCIAL

## 2021-11-18 ENCOUNTER — PATIENT MESSAGE (OUTPATIENT)
Dept: FAMILY MEDICINE | Facility: CLINIC | Age: 31
End: 2021-11-18
Payer: COMMERCIAL

## 2021-11-18 RX ORDER — METOPROLOL SUCCINATE 25 MG/1
12.5 TABLET, EXTENDED RELEASE ORAL DAILY
Qty: 45 TABLET | Refills: 0 | Status: SHIPPED | OUTPATIENT
Start: 2021-11-18 | End: 2022-03-03 | Stop reason: SDUPTHER

## 2021-11-19 LAB
OHS CV EVENT MONITOR DAY: 0
OHS CV HOLTER LENGTH DECIMAL HOURS: 48
OHS CV HOLTER LENGTH HOURS: 48
OHS CV HOLTER LENGTH MINUTES: 0
OHS CV HOLTER SINUS AVERAGE HR: 91
OHS CV HOLTER SINUS MAX HR: 153
OHS CV HOLTER SINUS MIN HR: 50

## 2021-11-22 ENCOUNTER — TELEPHONE (OUTPATIENT)
Dept: FAMILY MEDICINE | Facility: CLINIC | Age: 31
End: 2021-11-22
Payer: COMMERCIAL

## 2021-11-29 ENCOUNTER — PATIENT MESSAGE (OUTPATIENT)
Dept: FAMILY MEDICINE | Facility: CLINIC | Age: 31
End: 2021-11-29
Payer: COMMERCIAL

## 2022-01-04 ENCOUNTER — PATIENT MESSAGE (OUTPATIENT)
Dept: FAMILY MEDICINE | Facility: CLINIC | Age: 32
End: 2022-01-04
Payer: COMMERCIAL

## 2022-01-05 ENCOUNTER — PATIENT MESSAGE (OUTPATIENT)
Dept: FAMILY MEDICINE | Facility: CLINIC | Age: 32
End: 2022-01-05
Payer: COMMERCIAL

## 2022-01-06 ENCOUNTER — PATIENT MESSAGE (OUTPATIENT)
Dept: FAMILY MEDICINE | Facility: CLINIC | Age: 32
End: 2022-01-06
Payer: COMMERCIAL

## 2022-01-12 ENCOUNTER — PATIENT MESSAGE (OUTPATIENT)
Dept: FAMILY MEDICINE | Facility: CLINIC | Age: 32
End: 2022-01-12
Payer: COMMERCIAL

## 2022-01-12 NOTE — TELEPHONE ENCOUNTER
Scheduled pt appt on 01/14/2022. Offered pt an appt on 01/13/2021 At 4pm . Pt declined . Pt stated would like to come in on 1/14/2022

## 2022-01-14 ENCOUNTER — OFFICE VISIT (OUTPATIENT)
Dept: FAMILY MEDICINE | Facility: CLINIC | Age: 32
End: 2022-01-14
Payer: COMMERCIAL

## 2022-01-14 VITALS
TEMPERATURE: 98 F | WEIGHT: 228.63 LBS | DIASTOLIC BLOOD PRESSURE: 76 MMHG | SYSTOLIC BLOOD PRESSURE: 110 MMHG | BODY MASS INDEX: 36.74 KG/M2 | OXYGEN SATURATION: 98 % | HEIGHT: 66 IN | HEART RATE: 86 BPM

## 2022-01-14 DIAGNOSIS — E28.2 PCOS (POLYCYSTIC OVARIAN SYNDROME): ICD-10-CM

## 2022-01-14 DIAGNOSIS — R09.81 NASAL CONGESTION: ICD-10-CM

## 2022-01-14 DIAGNOSIS — U07.1 COVID-19 VIRUS INFECTION: Primary | ICD-10-CM

## 2022-01-14 DIAGNOSIS — U07.1 COVID-19 VIRUS DETECTED: ICD-10-CM

## 2022-01-14 DIAGNOSIS — F41.9 ANXIETY: ICD-10-CM

## 2022-01-14 DIAGNOSIS — F33.1 MODERATE EPISODE OF RECURRENT MAJOR DEPRESSIVE DISORDER: ICD-10-CM

## 2022-01-14 DIAGNOSIS — G44.229 CHRONIC TENSION-TYPE HEADACHE, NOT INTRACTABLE: ICD-10-CM

## 2022-01-14 LAB
CTP QC/QA: YES
CTP QC/QA: YES
FLUAV AG NPH QL: NEGATIVE
FLUBV AG NPH QL: NEGATIVE
SARS-COV-2 RDRP RESP QL NAA+PROBE: POSITIVE

## 2022-01-14 PROCEDURE — 1160F PR REVIEW ALL MEDS BY PRESCRIBER/CLIN PHARMACIST DOCUMENTED: ICD-10-PCS | Mod: S$GLB,,, | Performed by: PHYSICIAN ASSISTANT

## 2022-01-14 PROCEDURE — 99214 OFFICE O/P EST MOD 30 MIN: CPT | Mod: 25,S$GLB,, | Performed by: PHYSICIAN ASSISTANT

## 2022-01-14 PROCEDURE — 99214 PR OFFICE/OUTPT VISIT, EST, LEVL IV, 30-39 MIN: ICD-10-PCS | Mod: 25,S$GLB,, | Performed by: PHYSICIAN ASSISTANT

## 2022-01-14 PROCEDURE — 87804 INFLUENZA ASSAY W/OPTIC: CPT | Mod: QW,,, | Performed by: PHYSICIAN ASSISTANT

## 2022-01-14 PROCEDURE — 1160F RVW MEDS BY RX/DR IN RCRD: CPT | Mod: S$GLB,,, | Performed by: PHYSICIAN ASSISTANT

## 2022-01-14 PROCEDURE — 3008F BODY MASS INDEX DOCD: CPT | Mod: S$GLB,,, | Performed by: PHYSICIAN ASSISTANT

## 2022-01-14 PROCEDURE — U0002: ICD-10-PCS | Mod: QW,S$GLB,, | Performed by: PHYSICIAN ASSISTANT

## 2022-01-14 PROCEDURE — U0002 COVID-19 LAB TEST NON-CDC: HCPCS | Mod: QW,S$GLB,, | Performed by: PHYSICIAN ASSISTANT

## 2022-01-14 PROCEDURE — 87804 POCT INFLUENZA A/B: ICD-10-PCS | Mod: 59,QW,, | Performed by: PHYSICIAN ASSISTANT

## 2022-01-14 PROCEDURE — 3008F PR BODY MASS INDEX (BMI) DOCUMENTED: ICD-10-PCS | Mod: S$GLB,,, | Performed by: PHYSICIAN ASSISTANT

## 2022-01-14 RX ORDER — ZINC GLUCONATE 50 MG
50 TABLET ORAL
COMMUNITY

## 2022-01-14 RX ORDER — METOPROLOL TARTRATE 25 MG/1
12.5 TABLET, FILM COATED ORAL
COMMUNITY
End: 2022-08-26

## 2022-01-14 RX ORDER — SERTRALINE HYDROCHLORIDE 100 MG/1
200 TABLET, FILM COATED ORAL DAILY
Qty: 180 TABLET | Refills: 1 | Status: SHIPPED | OUTPATIENT
Start: 2022-01-14 | End: 2023-01-11

## 2022-01-14 RX ORDER — PROMETHAZINE HYDROCHLORIDE 25 MG/1
TABLET ORAL
COMMUNITY
Start: 2021-12-08 | End: 2023-01-11

## 2022-01-14 RX ORDER — IBUPROFEN 100 MG/5ML
1000 SUSPENSION, ORAL (FINAL DOSE FORM) ORAL
COMMUNITY

## 2022-01-14 RX ORDER — FAMOTIDINE 20 MG/1
TABLET, FILM COATED ORAL
COMMUNITY
Start: 2021-12-01 | End: 2023-01-11

## 2022-01-14 RX ORDER — ALBUTEROL SULFATE 90 UG/1
2 AEROSOL, METERED RESPIRATORY (INHALATION)
Qty: 18 G | Refills: 0 | Status: SHIPPED | OUTPATIENT
Start: 2022-01-14 | End: 2023-11-27

## 2022-01-14 NOTE — PATIENT INSTRUCTIONS
Patient Education       COVID-19 Discharge Instructions   About this topic   Coronavirus disease 2019 is also known as COVID-19. It is a viral illness that infects the lungs. It is caused by a virus called SARS-associated coronavirus (SARS-CoV-2).  The signs of COVID-19 most often start a few days after you have been infected. In some people, it takes longer to show signs. Others never show signs of the infection. You may have a cough, fever, shaking chills and it may be hard to breathe. You may be very tired, have muscle aches, a headache or sore throat. Some people have an upset stomach or loose stools. Others lose their sense of smell or taste. You may not have these signs all the time and they may come and go while you are sick.  The virus spreads easily through droplets when you talk, sneeze, or cough. You can pass the virus to others when you are talking close together, singing, hugging, sharing food, or shaking hands. Doctors believe the germs also survive on surfaces like tables, door handles, and telephones. However, this is not a common way that COVID-19 spreads. Doctors believe you can also spread the infection even if you dont have any symptoms, but they do not know how that happens. This is why getting vaccinated is one of the best ways to keep you healthy and slow the spread of the virus.  Some people have a mild case of COVID-19 and are able to stay at home and away from others until they feel better. Others may need to be in the hospital if they are very sick. Some people with COVID-19 can have some symptoms for weeks or months. People with COVID-19 must isolate themselves. You can start to be around others when your doctor says it is safe to do so.       What care is needed at home?   · Ask your doctor what you need to do when you go home. Make sure you ask questions if you do not understand what the doctor says.  · Drink lots of water, juice, or broth to replace fluids lost from a fever.  · You  may use cool mist humidifiers to help ease congestion and coughing.  · Use 2 to 3 pillows to prop yourself up when you lie down to make it easier to breathe and sleep.  · Do not smoke and do not drink beer, wine, and mixed drinks (alcohol).  · To lower the chance of passing the infection to others, get a COVID-19 vaccine after your infection has resolved.  · If you have not been fully vaccinated:  ? Wear a mask over your mouth and nose if you are around others who are not sick. Cloth masks work best if they have more than one layer of fabric.  ? Wash your hands often.  ? Stay home in a separate room, if possible, away from others. Only go out to get medical care.  ? Use a separate bathroom if possible.  ? Do not make food for others.  What follow-up care is needed?   · Your doctor may ask you to make visits to the office to check on your progress. Be sure to keep these visits. Make sure you wear a mask at these visits.  · If you can, tell the staff you have COVID-19 ahead of time so they can take extra care to stop the disease from spreading.  · It may take a few weeks before your health returns to normal.  What drugs may be needed?   The doctor may order drugs to:  · Help with breathing  · Help with fever  · Help with swelling in your airways and lungs  · Control coughing  · Ease a sore throat  · Help a runny or stuffy nose  Will physical activity be limited?   You may have to limit your physical activity. Talk to your doctor about the right amount of activity for you. If you have been very sick with COVID-19, it can take some time to get your strength back.  Will there be any other care needed?   Doctors do not know how long you can pass the virus on to others after you are sick. This is why it is important to stay in a separate room, if possible, when you are sick. For now, doctors are giving general guidelines for you to follow after you have been sick. Before you go around other people, you should:  · Be fever  free for 24 hours without taking any drugs to lower the fever  · Have no symptoms of cough or shortness of breath  · Wait at least 10 days after first having symptoms or your first positive test, and you need to be symptom free as above. Some experts suggest waiting 20 days if you have had a more severe infection.  Talk with your doctor about getting a COVID-19 vaccine.  What problems could happen?   · Fluid loss. This is dehydration.  · Short-term or long-term lung damage  · Heart problems  · Death  When do I need to call the doctor?   · You are having so much trouble breathing that you can only say one or two words at a time.  · You need to sit upright at all times to be able to breathe and/or cannot lie down.  · You are very confused or cannot stay awake.  · Your lips or skin start to turn blue or grey.  · You think you might be having a medical emergency. Some examples of medical emergencies are:  ? Severe chest pain.  ? Not able to speak or move normally.  · You have trouble breathing when talking or sitting still.  · You have new shortness of breath.  · You become weak or dizzy.  · You have very dark urine or do not pass urine for more than 8 hours.  · You have new or worsening COVID-19 symptoms like:  ? Fever  ? Cough  ? Feeling very tired  ? Shaking chills  ? Headache  ? Trouble swallowing  ? Throwing up  ? Loose stools  ? Reddish purple spots on your fingers or toes  Teach Back: Helping You Understand   The Teach Back Method helps you understand the information we are giving you. After you talk with the staff, tell them in your own words what you learned. This helps to make sure the staff has described each thing clearly. It also helps to explain things that may have been confusing. Before going home, make sure you can do these:  · I can tell you about my condition.  · I can tell you what may help ease my breathing.  · I can tell you what I can do to help avoid passing the infection to others.  · I can tell  you what I will do if I have trouble breathing; feel sleepy or confused; or my fingertips, fingernails, skin, or lips are blue.  Where can I learn more?   Centers for Disease Control and Prevention  https://www.cdc.gov/coronavirus/2019-ncov/about/index.html   Centers for Disease Control and Prevention  https://www.cdc.gov/coronavirus/2019-ncov/hcp/disposition-in-home-patients.html   World Health Organization  https://www.who.int/news-room/q-a-detail/g-u-cbhkqrztilnda   Last Reviewed Date   2021-10-05  Consumer Information Use and Disclaimer   This information is not specific medical advice and does not replace information you receive from your health care provider. This is only a brief summary of general information. It does NOT include all information about conditions, illnesses, injuries, tests, procedures, treatments, therapies, discharge instructions or life-style choices that may apply to you. You must talk with your health care provider for complete information about your health and treatment options. This information should not be used to decide whether or not to accept your health care providers advice, instructions or recommendations. Only your health care provider has the knowledge and training to provide advice that is right for you.  Copyright   Copyright © 2021 [employer] and its affiliates and/or licensors. All rights reserved.

## 2022-01-14 NOTE — PROGRESS NOTES
"  SUBJECTIVE:    Patient ID: Oneida Ingram is a 31 y.o. female.    Chief Complaint: Follow-up (Brought bottles, vaccine denied, rapid test taken Tuesday and was negative//dp)    Pt is a 31 y.o. female who presents today for a sick visit with a CC of "a cough, diarrhea, and sinus congestion." She was recently exposed to her daughter who tested positive for COVID-19.  She experienced an acute onset of these symptoms on Tuesday.  They tend to be constantly present, progressed Wednesday, but have been improving since yesterday.  She has been taking OTC Benadryl, Mucinex, and Pepcid which provide temporary relief of her symptoms, but it is short lived.  She denies any CP, SOB, palpitations, or syncopal episodes.    She has received 2 doses of the Pfizer COVID-19 vaccine at this time.  She has not yet received the influenza vaccine.    Office Visit on 01/14/2022   Component Date Value Ref Range Status    POC Rapid COVID 01/14/2022 Positive* Negative Final     Acceptable 01/14/2022 Yes   Final    Rapid Influenza A Ag 01/14/2022 Negative  Negative Final    Rapid Influenza B Ag 01/14/2022 Negative  Negative Final     Acceptable 01/14/2022 Yes   Final   Clinical Support on 11/11/2021   Component Date Value Ref Range Status    Holter length hours 11/11/2021 48   Final    holter length minutes 11/11/2021 0   Final    holter length dec hours 11/11/2021 48.00   Final    Sinus min HR 11/11/2021 50   Final    Sinus max hr 11/11/2021 153   Final    Sinus avg hr 11/11/2021 91   Final    Event Monitor Day 11/11/2021 0   Final   Office Visit on 11/03/2021   Component Date Value Ref Range Status    WBC 11/03/2021 7.5  3.8 - 10.8 Thousand/uL Final    RBC 11/03/2021 5.12* 3.80 - 5.10 Million/uL Final    Hemoglobin 11/03/2021 14.6  11.7 - 15.5 g/dL Final    Hematocrit 11/03/2021 44.4  35.0 - 45.0 % Final    MCV 11/03/2021 86.7  80.0 - 100.0 fL Final    MCH 11/03/2021 28.5  27.0 - 33.0 pg Final "    MCHC 11/03/2021 32.9  32.0 - 36.0 g/dL Final    RDW 11/03/2021 12.6  11.0 - 15.0 % Final    Platelets 11/03/2021 370  140 - 400 Thousand/uL Final    MPV 11/03/2021 11.2  7.5 - 12.5 fL Final    Neutrophils, Abs 11/03/2021 3,930  1,500 - 7,800 cells/uL Final    Lymph # 11/03/2021 2,963  850 - 3,900 cells/uL Final    Mono # 11/03/2021 473  200 - 950 cells/uL Final    Eos # 11/03/2021 98  15 - 500 cells/uL Final    Baso # 11/03/2021 38  0 - 200 cells/uL Final    Neutrophils Relative 11/03/2021 52.4  % Final    Lymph % 11/03/2021 39.5  % Final    Mono % 11/03/2021 6.3  % Final    Eosinophil % 11/03/2021 1.3  % Final    Basophil % 11/03/2021 0.5  % Final    Glucose 11/03/2021 87  65 - 99 mg/dL Final    BUN 11/03/2021 10  7 - 25 mg/dL Final    Creatinine 11/03/2021 0.67  0.50 - 1.10 mg/dL Final    eGFR if non African American 11/03/2021 118  > OR = 60 mL/min/1.73m2 Final    eGFR if African American 11/03/2021 137  > OR = 60 mL/min/1.73m2 Final    BUN/Creatinine Ratio 11/03/2021 NOT APPLICABLE  6 - 22 (calc) Final    Sodium 11/03/2021 140  135 - 146 mmol/L Final    Potassium 11/03/2021 4.1  3.5 - 5.3 mmol/L Final    Chloride 11/03/2021 104  98 - 110 mmol/L Final    CO2 11/03/2021 24  20 - 32 mmol/L Final    Calcium 11/03/2021 9.6  8.6 - 10.2 mg/dL Final    Total Protein 11/03/2021 7.0  6.1 - 8.1 g/dL Final    Albumin 11/03/2021 4.4  3.6 - 5.1 g/dL Final    Globulin, Total 11/03/2021 2.6  1.9 - 3.7 g/dL (calc) Final    Albumin/Globulin Ratio 11/03/2021 1.7  1.0 - 2.5 (calc) Final    Total Bilirubin 11/03/2021 0.7  0.2 - 1.2 mg/dL Final    Alkaline Phosphatase 11/03/2021 72  31 - 125 U/L Final    AST 11/03/2021 25  10 - 30 U/L Final    ALT 11/03/2021 52* 6 - 29 U/L Final    Hemoglobin A1C 11/03/2021 5.5  <5.7 % of total Hgb Final    Cholesterol 11/03/2021 177  <200 mg/dL Final    HDL 11/03/2021 37* > OR = 50 mg/dL Final    Triglycerides 11/03/2021 172* <150 mg/dL Final    LDL  Cholesterol 2021 111* mg/dL (calc) Final    HDL/Cholesterol Ratio 2021 4.8  <5.0 (calc) Final    Non HDL Chol. (LDL+VLDL) 2021 140* <130 mg/dL (calc) Final    TSH w/reflex to FT4 2021 1.05  mIU/L Final       Past Medical History:   Diagnosis Date    Anxiety     MRSA pneumonia     Pneumonia      Past Surgical History:   Procedure Laterality Date    APPENDECTOMY      BRONCHOSCOPY       SECTION, LOW TRANSVERSE      DILATION AND CURETTAGE OF UTERUS N/A 2020    Procedure: DILATION AND CURETTAGE, UTERUS;  Surgeon: Srinivasan Stewart MD;  Location: [facility] OR;  Service: OB/GYN;  Laterality: N/A;    HYSTEROSCOPIC POLYPECTOMY OF UTERUS N/A 2020    Procedure: POLYPECTOMY, UTERUS, HYSTEROSCOPIC;  Surgeon: Srinivasan Stewart MD;  Location: [facility] OR;  Service: OB/GYN;  Laterality: N/A;    HYSTEROSCOPY N/A 2020    Procedure: HYSTEROSCOPY;  Surgeon: Srinivasan Stewart MD;  Location: [facility] OR;  Service: OB/GYN;  Laterality: N/A;    TONSILLECTOMY       Family History   Problem Relation Age of Onset    COPD Mother     Asthma Mother     Heart disease Mother     Heart disease Brother     Heart disease Maternal Grandfather     Diabetes Paternal Grandmother     Heart disease Paternal Grandmother     Breast cancer Paternal Aunt     Breast cancer Paternal Aunt     Breast cancer Paternal Cousin     Immunodeficiency Neg Hx     Eczema Neg Hx     Rhinitis Neg Hx        Marital Status:   Alcohol History:  reports current alcohol use.  Tobacco History:  reports that she has never smoked. She has never used smokeless tobacco.  Drug History:  reports no history of drug use.    Health Maintenance Topics with due status: Not Due       Topic Last Completion Date    Pneumococcal Vaccines (Age 0-64) 2017    COVID-19 Vaccine 2021     Immunization History   Administered Date(s) Administered    COVID-19, MRNA, LN-S, PF (Pfizer) 2021, 2021    Influenza  (FLUBLOK) - Quadrivalent - Recombinant - PF *Preferred* (egg allergy) 11/14/2019    Pneumococcal Polysaccharide - 23 Valent 07/13/2017       Review of patient's allergies indicates:   Allergen Reactions    Azithromycin Hives    Bactrim [sulfamethoxazole-trimethoprim]      Rash     Ciprofloxacin      Doesn't remember ? Rash     Naltrexone-bupropion Nausea And Vomiting and Other (See Comments)     hands and face numbness    Penicillins Rash     Tiny raised bumps. Not urticarial- 10 years.        Current Outpatient Medications:     ascorbic acid, vitamin C, (VITAMIN C) 1000 MG tablet, Take 1,000 mg by mouth., Disp: , Rfl:     aspirin (ECOTRIN) 81 MG EC tablet, Take 81 mg by mouth once daily., Disp: , Rfl:     cetirizine (ZYRTEC) 10 MG tablet, Take 10 mg by mouth once daily., Disp: , Rfl:     cinnamon bark 500 mg capsule, Take 500 mg by mouth once daily., Disp: , Rfl:     clonazePAM (KLONOPIN) 0.5 MG tablet, Take 1 tablet (0.5 mg total) by mouth 2 (two) times daily as needed for Anxiety., Disp: 60 tablet, Rfl: 1    famotidine (PEPCID) 20 MG tablet, TAKE 1 TABLET BY MOUTH AT BEDTIME THE NIGHT BEFORE SURGERY AND AGAIN THE THE MORNING WITH A SIP OF WATER, Disp: , Rfl:     fluticasone (FLONASE) 50 mcg/actuation nasal spray, 1 spray by Each Nare route once daily., Disp: , Rfl:     folic acid (FOLVITE) 1 MG tablet, Take 4 tablets (4 mg total) by mouth once daily., Disp: 360 tablet, Rfl: 3    letrozole (FEMARA) 2.5 mg Tab, TAKE 3 TABLETS BY MOUTH EVERY DAY ON CYCLE DAYS 3 THROUGH 7, Disp: , Rfl:     levocetirizine (XYZAL) 5 MG tablet, Take 1 tablet by mouth once daily., Disp: , Rfl:     levothyroxine (SYNTHROID) 100 MCG tablet, Take 1 tablet (100 mcg total) by mouth once daily., Disp: 90 tablet, Rfl: 0    magnesium 30 mg Tab, Take by mouth once., Disp: , Rfl:     metoprolol succinate (TOPROL-XL) 25 MG 24 hr tablet, Take 0.5 tablets (12.5 mg total) by mouth once daily., Disp: 45 tablet, Rfl: 0    metoprolol  "tartrate (LOPRESSOR) 25 MG tablet, Take 12.5 mg by mouth., Disp: , Rfl:     promethazine (PHENERGAN) 25 MG tablet, , Disp: , Rfl:     triamcinolone acetonide 0.1% (KENALOG) 0.1 % cream, aaa bid x 2-3 wks prn rash/itching, Disp: 80 g, Rfl: 3    UNABLE TO FIND, Take 1 tablet by mouth once daily. Methyl-balance, Disp: , Rfl:     UNABLE TO FIND, Take 4 capsules by mouth once daily. emery & Dchiro inositol, Disp: , Rfl:     UNABLE TO FIND, Take 2 capsules by mouth once daily. Calm  Sleep capsules, Disp: , Rfl:     vitamin D (VITAMIN D3) 1000 units Tab, Take 1,000 Units by mouth once daily., Disp: , Rfl:     vitamin E 400 UNIT capsule, Take 400 Units by mouth once daily., Disp: , Rfl:     zinc gluconate 50 mg tablet, Take 50 mg by mouth., Disp: , Rfl:     albuterol (PROAIR HFA) 90 mcg/actuation inhaler, Inhale 2 puffs into the lungs every 4 to 6 hours as needed., Disp: 18 g, Rfl: 0    sertraline (ZOLOFT) 100 MG tablet, Take 2 tablets (200 mg total) by mouth once daily., Disp: 180 tablet, Rfl: 1    Review of Systems   Constitutional: Positive for appetite change ("Decreased.") and fever ("Last night of 101.0 F."). Negative for activity change, chills and fatigue.   HENT: Positive for congestion ("Nasal congestion."). Negative for ear pain, postnasal drip, rhinorrhea and sore throat.    Respiratory: Positive for cough ("Mostly clear, slight green."). Negative for shortness of breath and wheezing.    Cardiovascular: Negative for chest pain and palpitations.   Gastrointestinal: Positive for diarrhea ("Watery.") and nausea. Negative for abdominal pain, constipation and vomiting.   Genitourinary: Negative for dysuria and hematuria.   Musculoskeletal: Negative for arthralgias and myalgias.   Neurological: Negative for dizziness, syncope, weakness and light-headedness.   Psychiatric/Behavioral: Negative for behavioral problems.          Objective:      Vitals:    01/14/22 1035   BP: 110/76   Pulse: 86   Temp: 97.8 °F " "(36.6 °C)   SpO2: 98%   Weight: 103.7 kg (228 lb 9.6 oz)   Height: 5' 6" (1.676 m)     Physical Exam  Vitals and nursing note reviewed.   Constitutional:       General: She is not in acute distress.     Appearance: Normal appearance. She is obese. She is not ill-appearing, toxic-appearing or diaphoretic.   HENT:      Head: Normocephalic and atraumatic.      Right Ear: External ear normal.      Left Ear: External ear normal.   Eyes:      General: No scleral icterus.     Extraocular Movements: Extraocular movements intact.      Conjunctiva/sclera: Conjunctivae normal.   Neck:      Vascular: No carotid bruit.   Cardiovascular:      Rate and Rhythm: Normal rate and regular rhythm.      Pulses: Normal pulses.      Heart sounds: Normal heart sounds. No murmur heard.  No friction rub. No gallop.    Pulmonary:      Effort: Pulmonary effort is normal. No respiratory distress.      Breath sounds: Normal breath sounds. No stridor. No wheezing, rhonchi or rales.      Comments: SpO2: 98%  No use of respiratory accessory muscles noted.  Adequate breath sounds appreciated in the diffuse bilateral lung fields with no signs of consolidation noted on auscultation.  Abdominal:      General: There is no distension.      Palpations: Abdomen is soft. There is no mass.      Tenderness: There is no abdominal tenderness. There is no guarding or rebound.   Musculoskeletal:         General: No swelling or tenderness. Normal range of motion.      Cervical back: Normal range of motion and neck supple.      Right lower leg: No edema.      Left lower leg: No edema.   Lymphadenopathy:      Cervical: No cervical adenopathy.   Skin:     General: Skin is warm and dry.      Capillary Refill: Capillary refill takes less than 2 seconds.      Coloration: Skin is not jaundiced or pale.   Neurological:      General: No focal deficit present.      Mental Status: She is alert. Mental status is at baseline.      Cranial Nerves: No cranial nerve deficit.      " Motor: No weakness.      Gait: Gait normal.   Psychiatric:         Mood and Affect: Mood normal.         Behavior: Behavior normal. Behavior is cooperative.           Assessment:       1. COVID-19 virus infection    2. Moderate episode of recurrent major depressive disorder    3. Nasal congestion    4. Anxiety    5. PCOS (polycystic ovarian syndrome)    6. Chronic tension-type headache, not intractable           Plan:       COVID-19 virus infection  Comments:  POCT COVID-19 swab today - positive.  POCT Influenza A/B swab today - both negative.  COVID-19 risk score: 2  Patient was offered, but declined, EUA antibody infusion therapy today. She will contact the office if symptoms worsen or if he reconsiders.  Quarantine, rest, and remain well hydrated drinking 6-8, 8 oz cups of water alternating with Pedialyte daily.  Take OTC Ibuprofen q12 hr as needed for fever control.  Start Zinc, Vitamin D3, and Vitamin-C daily.  Start Mucinex-DM b.i.d. x 10 days for mucolytic and cough suppression.  If fever spikes, extreme SOB is experience, or extreme elevations/drops in BP is experience, present to the ER.    Moderate episode of recurrent major depressive disorder  -     sertraline (ZOLOFT) 100 MG tablet; Take 2 tablets (200 mg total) by mouth once daily.  Dispense: 180 tablet; Refill: 1    Nasal congestion  -     POCT COVID-19 Rapid Screening  -     POCT Influenza A/B    Anxiety    PCOS (polycystic ovarian syndrome)    Chronic tension-type headache, not intractable    Other orders  -     albuterol (PROAIR HFA) 90 mcg/actuation inhaler; Inhale 2 puffs into the lungs every 4 to 6 hours as needed.  Dispense: 18 g; Refill: 0    Patient is in no acute distress and vital signs are stable today in office.    Follow up if symptoms worsen or fail to improve, for COVID-19 infection.        1/14/2022 John Rasheed PA-C

## 2022-01-17 ENCOUNTER — PATIENT MESSAGE (OUTPATIENT)
Dept: FAMILY MEDICINE | Facility: CLINIC | Age: 32
End: 2022-01-17
Payer: COMMERCIAL

## 2022-01-17 DIAGNOSIS — R05.9 COUGH IN ADULT: Primary | ICD-10-CM

## 2022-01-17 RX ORDER — PROMETHAZINE HYDROCHLORIDE AND DEXTROMETHORPHAN HYDROBROMIDE 6.25; 15 MG/5ML; MG/5ML
5 SYRUP ORAL EVERY 6 HOURS PRN
Qty: 180 ML | Refills: 0 | Status: SHIPPED | OUTPATIENT
Start: 2022-01-17 | End: 2022-01-27

## 2022-01-25 ENCOUNTER — PATIENT MESSAGE (OUTPATIENT)
Dept: FAMILY MEDICINE | Facility: CLINIC | Age: 32
End: 2022-01-25
Payer: COMMERCIAL

## 2022-01-26 ENCOUNTER — OFFICE VISIT (OUTPATIENT)
Dept: FAMILY MEDICINE | Facility: CLINIC | Age: 32
End: 2022-01-26
Payer: COMMERCIAL

## 2022-01-26 ENCOUNTER — PATIENT MESSAGE (OUTPATIENT)
Dept: FAMILY MEDICINE | Facility: CLINIC | Age: 32
End: 2022-01-26
Payer: COMMERCIAL

## 2022-01-26 VITALS
OXYGEN SATURATION: 99 % | HEIGHT: 66 IN | BODY MASS INDEX: 37.03 KG/M2 | TEMPERATURE: 99 F | HEART RATE: 83 BPM | DIASTOLIC BLOOD PRESSURE: 82 MMHG | WEIGHT: 230.38 LBS | SYSTOLIC BLOOD PRESSURE: 118 MMHG

## 2022-01-26 DIAGNOSIS — Z86.16 HISTORY OF COVID-19: Primary | ICD-10-CM

## 2022-01-26 DIAGNOSIS — R05.9 COUGH: ICD-10-CM

## 2022-01-26 PROCEDURE — 1160F RVW MEDS BY RX/DR IN RCRD: CPT | Mod: S$GLB,,, | Performed by: NURSE PRACTITIONER

## 2022-01-26 PROCEDURE — 3008F BODY MASS INDEX DOCD: CPT | Mod: S$GLB,,, | Performed by: NURSE PRACTITIONER

## 2022-01-26 PROCEDURE — 1160F PR REVIEW ALL MEDS BY PRESCRIBER/CLIN PHARMACIST DOCUMENTED: ICD-10-PCS | Mod: S$GLB,,, | Performed by: NURSE PRACTITIONER

## 2022-01-26 PROCEDURE — 3008F PR BODY MASS INDEX (BMI) DOCUMENTED: ICD-10-PCS | Mod: S$GLB,,, | Performed by: NURSE PRACTITIONER

## 2022-01-26 PROCEDURE — 99213 OFFICE O/P EST LOW 20 MIN: CPT | Mod: S$GLB,,, | Performed by: NURSE PRACTITIONER

## 2022-01-26 PROCEDURE — 99213 PR OFFICE/OUTPT VISIT, EST, LEVL III, 20-29 MIN: ICD-10-PCS | Mod: S$GLB,,, | Performed by: NURSE PRACTITIONER

## 2022-01-26 NOTE — PROGRESS NOTES
SUBJECTIVE:    Patient ID: Oneida Ingram is a 31 y.o. female.    Chief Complaint: Chest Pain (No bottles// post covid cough and chest pain-MJ)    Pt presents for post-Covid follow-up. Tested positive for Covid on 1/14. Systems have mostly resolved but still dealing with an intermittent dry cough that is worse at night. Also reports mild midline chest tightness. Some SOB. No more fevers. Has used inhaler a few times. Would like to be checked out d/t history of pneumonia requiring intubation several years ago. She is concerned she is at high risk of this developing again.         Office Visit on 01/14/2022   Component Date Value Ref Range Status    POC Rapid COVID 01/14/2022 Positive* Negative Final     Acceptable 01/14/2022 Yes   Final    Rapid Influenza A Ag 01/14/2022 Negative  Negative Final    Rapid Influenza B Ag 01/14/2022 Negative  Negative Final     Acceptable 01/14/2022 Yes   Final   Clinical Support on 11/11/2021   Component Date Value Ref Range Status    Holter length hours 11/11/2021 48   Final    holter length minutes 11/11/2021 0   Final    holter length dec hours 11/11/2021 48.00   Final    Sinus min HR 11/11/2021 50   Final    Sinus max hr 11/11/2021 153   Final    Sinus avg hr 11/11/2021 91   Final    Event Monitor Day 11/11/2021 0   Final   Office Visit on 11/03/2021   Component Date Value Ref Range Status    WBC 11/03/2021 7.5  3.8 - 10.8 Thousand/uL Final    RBC 11/03/2021 5.12* 3.80 - 5.10 Million/uL Final    Hemoglobin 11/03/2021 14.6  11.7 - 15.5 g/dL Final    Hematocrit 11/03/2021 44.4  35.0 - 45.0 % Final    MCV 11/03/2021 86.7  80.0 - 100.0 fL Final    MCH 11/03/2021 28.5  27.0 - 33.0 pg Final    MCHC 11/03/2021 32.9  32.0 - 36.0 g/dL Final    RDW 11/03/2021 12.6  11.0 - 15.0 % Final    Platelets 11/03/2021 370  140 - 400 Thousand/uL Final    MPV 11/03/2021 11.2  7.5 - 12.5 fL Final    Neutrophils, Abs 11/03/2021 3,930  1,500 - 7,800  cells/uL Final    Lymph # 11/03/2021 2,963  850 - 3,900 cells/uL Final    Mono # 11/03/2021 473  200 - 950 cells/uL Final    Eos # 11/03/2021 98  15 - 500 cells/uL Final    Baso # 11/03/2021 38  0 - 200 cells/uL Final    Neutrophils Relative 11/03/2021 52.4  % Final    Lymph % 11/03/2021 39.5  % Final    Mono % 11/03/2021 6.3  % Final    Eosinophil % 11/03/2021 1.3  % Final    Basophil % 11/03/2021 0.5  % Final    Glucose 11/03/2021 87  65 - 99 mg/dL Final    BUN 11/03/2021 10  7 - 25 mg/dL Final    Creatinine 11/03/2021 0.67  0.50 - 1.10 mg/dL Final    eGFR if non African American 11/03/2021 118  > OR = 60 mL/min/1.73m2 Final    eGFR if African American 11/03/2021 137  > OR = 60 mL/min/1.73m2 Final    BUN/Creatinine Ratio 11/03/2021 NOT APPLICABLE  6 - 22 (calc) Final    Sodium 11/03/2021 140  135 - 146 mmol/L Final    Potassium 11/03/2021 4.1  3.5 - 5.3 mmol/L Final    Chloride 11/03/2021 104  98 - 110 mmol/L Final    CO2 11/03/2021 24  20 - 32 mmol/L Final    Calcium 11/03/2021 9.6  8.6 - 10.2 mg/dL Final    Total Protein 11/03/2021 7.0  6.1 - 8.1 g/dL Final    Albumin 11/03/2021 4.4  3.6 - 5.1 g/dL Final    Globulin, Total 11/03/2021 2.6  1.9 - 3.7 g/dL (calc) Final    Albumin/Globulin Ratio 11/03/2021 1.7  1.0 - 2.5 (calc) Final    Total Bilirubin 11/03/2021 0.7  0.2 - 1.2 mg/dL Final    Alkaline Phosphatase 11/03/2021 72  31 - 125 U/L Final    AST 11/03/2021 25  10 - 30 U/L Final    ALT 11/03/2021 52* 6 - 29 U/L Final    Hemoglobin A1C 11/03/2021 5.5  <5.7 % of total Hgb Final    Cholesterol 11/03/2021 177  <200 mg/dL Final    HDL 11/03/2021 37* > OR = 50 mg/dL Final    Triglycerides 11/03/2021 172* <150 mg/dL Final    LDL Cholesterol 11/03/2021 111* mg/dL (calc) Final    HDL/Cholesterol Ratio 11/03/2021 4.8  <5.0 (calc) Final    Non HDL Chol. (LDL+VLDL) 11/03/2021 140* <130 mg/dL (calc) Final    TSH w/reflex to FT4 11/03/2021 1.05  mIU/L Final       Past Medical History:    Diagnosis Date    Anxiety     MRSA pneumonia     Pneumonia      Past Surgical History:   Procedure Laterality Date    APPENDECTOMY      BRONCHOSCOPY       SECTION, LOW TRANSVERSE      DILATION AND CURETTAGE OF UTERUS N/A 2020    Procedure: DILATION AND CURETTAGE, UTERUS;  Surgeon: Srinivasan Stewart MD;  Location: [facility] OR;  Service: OB/GYN;  Laterality: N/A;    HYSTEROSCOPIC POLYPECTOMY OF UTERUS N/A 2020    Procedure: POLYPECTOMY, UTERUS, HYSTEROSCOPIC;  Surgeon: Srinivasan Stewart MD;  Location: [facility] OR;  Service: OB/GYN;  Laterality: N/A;    HYSTEROSCOPY N/A 2020    Procedure: HYSTEROSCOPY;  Surgeon: Srinivasan Stewart MD;  Location: [facility] OR;  Service: OB/GYN;  Laterality: N/A;    TONSILLECTOMY       Family History   Problem Relation Age of Onset    COPD Mother     Asthma Mother     Heart disease Mother     Heart disease Brother     Heart disease Maternal Grandfather     Diabetes Paternal Grandmother     Heart disease Paternal Grandmother     Breast cancer Paternal Aunt     Breast cancer Paternal Aunt     Breast cancer Paternal Cousin     Immunodeficiency Neg Hx     Eczema Neg Hx     Rhinitis Neg Hx        Marital Status:   Alcohol History:  reports current alcohol use.  Tobacco History:  reports that she has never smoked. She has never used smokeless tobacco.  Drug History:  reports no history of drug use.    Review of patient's allergies indicates:   Allergen Reactions    Azithromycin Hives    Bactrim [sulfamethoxazole-trimethoprim]      Rash     Ciprofloxacin      Doesn't remember ? Rash     Naltrexone-bupropion Nausea And Vomiting and Other (See Comments)     hands and face numbness    Penicillins Rash     Tiny raised bumps. Not urticarial- 10 years.        Current Outpatient Medications:     albuterol (PROAIR HFA) 90 mcg/actuation inhaler, Inhale 2 puffs into the lungs every 4 to 6 hours as needed., Disp: 18 g, Rfl: 0    ascorbic acid, vitamin  C, (VITAMIN C) 1000 MG tablet, Take 1,000 mg by mouth., Disp: , Rfl:     aspirin (ECOTRIN) 81 MG EC tablet, Take 81 mg by mouth once daily., Disp: , Rfl:     cetirizine (ZYRTEC) 10 MG tablet, Take 10 mg by mouth once daily., Disp: , Rfl:     cinnamon bark 500 mg capsule, Take 500 mg by mouth once daily., Disp: , Rfl:     clonazePAM (KLONOPIN) 0.5 MG tablet, Take 1 tablet (0.5 mg total) by mouth 2 (two) times daily as needed for Anxiety., Disp: 60 tablet, Rfl: 1    famotidine (PEPCID) 20 MG tablet, TAKE 1 TABLET BY MOUTH AT BEDTIME THE NIGHT BEFORE SURGERY AND AGAIN THE THE MORNING WITH A SIP OF WATER, Disp: , Rfl:     fluticasone (FLONASE) 50 mcg/actuation nasal spray, 1 spray by Each Nare route once daily., Disp: , Rfl:     folic acid (FOLVITE) 1 MG tablet, Take 4 tablets (4 mg total) by mouth once daily., Disp: 360 tablet, Rfl: 3    letrozole (FEMARA) 2.5 mg Tab, TAKE 3 TABLETS BY MOUTH EVERY DAY ON CYCLE DAYS 3 THROUGH 7, Disp: , Rfl:     levocetirizine (XYZAL) 5 MG tablet, Take 1 tablet by mouth once daily., Disp: , Rfl:     levothyroxine (SYNTHROID) 100 MCG tablet, Take 1 tablet (100 mcg total) by mouth once daily., Disp: 90 tablet, Rfl: 0    magnesium 30 mg Tab, Take by mouth once., Disp: , Rfl:     metoprolol succinate (TOPROL-XL) 25 MG 24 hr tablet, Take 0.5 tablets (12.5 mg total) by mouth once daily., Disp: 45 tablet, Rfl: 0    metoprolol tartrate (LOPRESSOR) 25 MG tablet, Take 12.5 mg by mouth., Disp: , Rfl:     promethazine (PHENERGAN) 25 MG tablet, , Disp: , Rfl:     promethazine-dextromethorphan (PROMETHAZINE-DM) 6.25-15 mg/5 mL Syrp, Take 5 mLs by mouth every 6 (six) hours as needed., Disp: 180 mL, Rfl: 0    sertraline (ZOLOFT) 100 MG tablet, Take 2 tablets (200 mg total) by mouth once daily., Disp: 180 tablet, Rfl: 1    triamcinolone acetonide 0.1% (KENALOG) 0.1 % cream, aaa bid x 2-3 wks prn rash/itching, Disp: 80 g, Rfl: 3    UNABLE TO FIND, Take 1 tablet by mouth once daily.  "Methyl-balance, Disp: , Rfl:     UNABLE TO FIND, Take 4 capsules by mouth once daily. emery & Dchiro inositol, Disp: , Rfl:     UNABLE TO FIND, Take 2 capsules by mouth once daily. Calm  Sleep capsules, Disp: , Rfl:     vitamin D (VITAMIN D3) 1000 units Tab, Take 1,000 Units by mouth once daily., Disp: , Rfl:     vitamin E 400 UNIT capsule, Take 400 Units by mouth once daily., Disp: , Rfl:     zinc gluconate 50 mg tablet, Take 50 mg by mouth., Disp: , Rfl:     Review of Systems   Constitutional: Positive for activity change and fatigue.   HENT: Positive for congestion and sinus pressure.    Respiratory: Positive for cough and chest tightness.    Cardiovascular: Negative for palpitations.   Neurological: Positive for headaches.   Psychiatric/Behavioral: Negative.           Objective:      Vitals:    01/26/22 1529 01/26/22 1541   BP: 118/82    Pulse: 83    Temp: 98.7 °F (37.1 °C)    SpO2: 98% 99%   Weight: 104.5 kg (230 lb 6.4 oz)    Height: 5' 6" (1.676 m)      Body mass index is 37.19 kg/m².  Physical Exam  Constitutional:       Appearance: Normal appearance.   HENT:      Head: Normocephalic and atraumatic.      Mouth/Throat:      Mouth: Mucous membranes are moist.      Pharynx: Oropharynx is clear.   Cardiovascular:      Rate and Rhythm: Normal rate.      Heart sounds: Normal heart sounds.   Pulmonary:      Effort: Pulmonary effort is normal. No respiratory distress.      Breath sounds: Normal breath sounds.   Skin:     General: Skin is warm.   Neurological:      Mental Status: She is alert and oriented to person, place, and time.   Psychiatric:         Mood and Affect: Mood normal.           Assessment:       1. History of COVID-19    2. Cough         Plan:       History of COVID-19    Cough    Physical exam unremarkable. Do not suspect pneumonia at this time. Advised pt to monitor symptoms. Return precautions discussed.    Follow up if symptoms worsen or fail to improve.              "

## 2022-02-02 ENCOUNTER — PATIENT MESSAGE (OUTPATIENT)
Dept: FAMILY MEDICINE | Facility: CLINIC | Age: 32
End: 2022-02-02
Payer: COMMERCIAL

## 2022-02-02 DIAGNOSIS — R39.9 UTI SYMPTOMS: Primary | ICD-10-CM

## 2022-02-04 LAB
AMORPH SED URNS QL MICRO: ABNORMAL /HPF
APPEARANCE UR: ABNORMAL
BACTERIA #/AREA URNS HPF: ABNORMAL /HPF
BACTERIA UR CULT: ABNORMAL
BILIRUB UR QL STRIP: NEGATIVE
COLOR UR: ABNORMAL
GLUCOSE UR QL STRIP: NEGATIVE
HGB UR QL STRIP: NEGATIVE
HYALINE CASTS #/AREA URNS LPF: ABNORMAL /LPF
KETONES UR QL STRIP: NEGATIVE
LEUKOCYTE ESTERASE UR QL STRIP: NEGATIVE
NITRITE UR QL STRIP: NEGATIVE
PH UR STRIP: ABNORMAL [PH] (ref 5–8)
PROT UR QL STRIP: NEGATIVE
RBC #/AREA URNS HPF: ABNORMAL /HPF
SP GR UR STRIP: 1.03 (ref 1–1.03)
SQUAMOUS #/AREA URNS HPF: ABNORMAL /HPF
WBC #/AREA URNS HPF: ABNORMAL /HPF

## 2022-02-07 ENCOUNTER — PATIENT MESSAGE (OUTPATIENT)
Dept: FAMILY MEDICINE | Facility: CLINIC | Age: 32
End: 2022-02-07
Payer: COMMERCIAL

## 2022-02-09 ENCOUNTER — TELEPHONE (OUTPATIENT)
Dept: PSYCHIATRY | Facility: CLINIC | Age: 32
End: 2022-02-09
Payer: COMMERCIAL

## 2022-03-03 RX ORDER — METOPROLOL SUCCINATE 25 MG/1
12.5 TABLET, EXTENDED RELEASE ORAL DAILY
Qty: 45 TABLET | Refills: 0 | Status: SHIPPED | OUTPATIENT
Start: 2022-03-03 | End: 2022-06-02 | Stop reason: SDUPTHER

## 2022-03-03 NOTE — TELEPHONE ENCOUNTER
----- Message from Emperatriz Ray sent at 3/3/2022  9:28 AM CST -----  Patient called and stated that she need a refill of her metoprolol succinate called into [facility] if any questions please give her a call at [phone]

## 2022-03-09 ENCOUNTER — PATIENT MESSAGE (OUTPATIENT)
Dept: FAMILY MEDICINE | Facility: CLINIC | Age: 32
End: 2022-03-09
Payer: COMMERCIAL

## 2022-03-09 RX ORDER — LEVOTHYROXINE SODIUM 100 UG/1
100 TABLET ORAL DAILY
Qty: 90 TABLET | Refills: 1 | Status: SHIPPED | OUTPATIENT
Start: 2022-03-09 | End: 2022-08-26

## 2022-03-22 NOTE — TELEPHONE ENCOUNTER
----- Message from Cory Pedraza sent at 4/1/2020  3:27 PM CDT -----  Contact: Oneida Ingram  Needs refill on Zoloft and clonopin  Send to [facility]  Pt# [phone]  
Pt needs a VV  
Spoke with pt and advised we are trying to set up a virtual visit.  Sent code to patient so she can get silvia and she was advised to call back to set up a virtual visit.  
Negative

## 2022-03-28 ENCOUNTER — PATIENT MESSAGE (OUTPATIENT)
Dept: FAMILY MEDICINE | Facility: CLINIC | Age: 32
End: 2022-03-28
Payer: COMMERCIAL

## 2022-03-28 DIAGNOSIS — F41.9 ANXIETY: ICD-10-CM

## 2022-03-29 RX ORDER — CLONAZEPAM 0.5 MG/1
0.5 TABLET ORAL 2 TIMES DAILY PRN
Qty: 60 TABLET | Refills: 0 | Status: SHIPPED | OUTPATIENT
Start: 2022-03-29 | End: 2022-07-13 | Stop reason: SDUPTHER

## 2022-04-22 ENCOUNTER — OFFICE VISIT (OUTPATIENT)
Dept: PSYCHIATRY | Facility: CLINIC | Age: 32
End: 2022-04-22
Payer: COMMERCIAL

## 2022-04-22 DIAGNOSIS — F33.1 MODERATE EPISODE OF RECURRENT MAJOR DEPRESSIVE DISORDER: ICD-10-CM

## 2022-04-22 PROCEDURE — 90791 PSYCH DIAGNOSTIC EVALUATION: CPT | Mod: S$GLB,,, | Performed by: SOCIAL WORKER

## 2022-04-22 PROCEDURE — 99999 PR PBB SHADOW E&M-EST. PATIENT-LVL II: ICD-10-PCS | Mod: PBBFAC,,, | Performed by: SOCIAL WORKER

## 2022-04-22 PROCEDURE — 1159F MED LIST DOCD IN RCRD: CPT | Mod: CPTII,S$GLB,, | Performed by: SOCIAL WORKER

## 2022-04-22 PROCEDURE — 90791 PR PSYCHIATRIC DIAGNOSTIC EVALUATION: ICD-10-PCS | Mod: S$GLB,,, | Performed by: SOCIAL WORKER

## 2022-04-22 PROCEDURE — 1159F PR MEDICATION LIST DOCUMENTED IN MEDICAL RECORD: ICD-10-PCS | Mod: CPTII,S$GLB,, | Performed by: SOCIAL WORKER

## 2022-04-22 PROCEDURE — 99999 PR PBB SHADOW E&M-EST. PATIENT-LVL II: CPT | Mod: PBBFAC,,, | Performed by: SOCIAL WORKER

## 2022-05-06 NOTE — PROGRESS NOTES
Patient presented for audiovisual telehealth visit. She said that she is in [place], [place] and I informed her that due to licensing we are unable to have a virtual visit. Patient acknowledged. She denied SI/HI and denied having any crises; mentioned that she is experiencing anxiety and would like to discuss infertility issues, which I have noted for our next visit. Scheduled f/u appts.     Diagnosis:     ICD-10-CM ICD-9-CM   1. Moderate episode of recurrent major depressive disorder  F33.1 296.32       Plan:  individual psychotherapy and medication management by physician Pt to go to ED or call 911 if symptoms worsen or if she has thoughts of harming self and/or others. Pt verbalized understanding.    Return to clinic: as scheduled        Each patient to whom he or she provides medical services by telemedicine is: (1) informed of the relationship between the physician and patient and the respective role of any other health care provider with respect to management of the patient; and (2) notified that he or she may decline to receive medical services by telemedicine and may withdraw from such care at any time.

## 2022-05-11 ENCOUNTER — OFFICE VISIT (OUTPATIENT)
Dept: PSYCHIATRY | Facility: CLINIC | Age: 32
End: 2022-05-11
Payer: COMMERCIAL

## 2022-05-11 ENCOUNTER — TELEPHONE (OUTPATIENT)
Dept: PSYCHIATRY | Facility: CLINIC | Age: 32
End: 2022-05-11
Payer: COMMERCIAL

## 2022-05-11 DIAGNOSIS — F33.1 MODERATE EPISODE OF RECURRENT MAJOR DEPRESSIVE DISORDER: Primary | ICD-10-CM

## 2022-05-11 PROCEDURE — 99499 NO LOS: ICD-10-PCS | Mod: 95,,, | Performed by: SOCIAL WORKER

## 2022-05-11 PROCEDURE — 99499 UNLISTED E&M SERVICE: CPT | Mod: 95,,, | Performed by: SOCIAL WORKER

## 2022-05-11 NOTE — TELEPHONE ENCOUNTER
----- Message from Paige Rich LCSW sent at 5/11/2022  1:17 PM CDT -----  Regarding: f/u  Pt lives in [place] so we were unable to do virtual visit. Can we please schedule her for this Friday at 3 pm and Monday May 23rd at 2 pm, both in person? Thank you

## 2022-05-11 NOTE — TELEPHONE ENCOUNTER
Appointments scheduled as requested. She also has an appointment already scheduled on 5/20. Would you like me to cancel that one?
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
## 2022-05-13 ENCOUNTER — OFFICE VISIT (OUTPATIENT)
Dept: PSYCHIATRY | Facility: CLINIC | Age: 32
End: 2022-05-13
Payer: COMMERCIAL

## 2022-05-13 DIAGNOSIS — F33.1 MODERATE EPISODE OF RECURRENT MAJOR DEPRESSIVE DISORDER: Primary | ICD-10-CM

## 2022-05-13 PROCEDURE — 90834 PSYTX W PT 45 MINUTES: CPT | Mod: S$GLB,,, | Performed by: SOCIAL WORKER

## 2022-05-13 PROCEDURE — 99999 PR PBB SHADOW E&M-EST. PATIENT-LVL II: CPT | Mod: PBBFAC,,, | Performed by: SOCIAL WORKER

## 2022-05-13 PROCEDURE — 1159F MED LIST DOCD IN RCRD: CPT | Mod: CPTII,S$GLB,, | Performed by: SOCIAL WORKER

## 2022-05-13 PROCEDURE — 90834 PR PSYCHOTHERAPY W/PATIENT, 45 MIN: ICD-10-PCS | Mod: S$GLB,,, | Performed by: SOCIAL WORKER

## 2022-05-13 PROCEDURE — 1159F PR MEDICATION LIST DOCUMENTED IN MEDICAL RECORD: ICD-10-PCS | Mod: CPTII,S$GLB,, | Performed by: SOCIAL WORKER

## 2022-05-13 PROCEDURE — 99999 PR PBB SHADOW E&M-EST. PATIENT-LVL II: ICD-10-PCS | Mod: PBBFAC,,, | Performed by: SOCIAL WORKER

## 2022-06-02 ENCOUNTER — PATIENT MESSAGE (OUTPATIENT)
Dept: FAMILY MEDICINE | Facility: CLINIC | Age: 32
End: 2022-06-02

## 2022-06-02 RX ORDER — METOPROLOL SUCCINATE 25 MG/1
12.5 TABLET, EXTENDED RELEASE ORAL DAILY
Qty: 45 TABLET | Refills: 0 | Status: SHIPPED | OUTPATIENT
Start: 2022-06-02 | End: 2022-06-02 | Stop reason: SDUPTHER

## 2022-06-02 RX ORDER — METOPROLOL SUCCINATE 25 MG/1
12.5 TABLET, EXTENDED RELEASE ORAL DAILY
Qty: 45 TABLET | Refills: 0 | Status: SHIPPED | OUTPATIENT
Start: 2022-06-02 | End: 2022-09-07 | Stop reason: SDUPTHER

## 2022-06-03 ENCOUNTER — PATIENT MESSAGE (OUTPATIENT)
Dept: PSYCHIATRY | Facility: CLINIC | Age: 32
End: 2022-06-03
Payer: COMMERCIAL

## 2022-06-03 ENCOUNTER — TELEPHONE (OUTPATIENT)
Dept: PSYCHIATRY | Facility: CLINIC | Age: 32
End: 2022-06-03
Payer: COMMERCIAL

## 2022-06-03 NOTE — TELEPHONE ENCOUNTER
Appointment rescheduled from Patient Portal   [facility], System Message   Sent:   7:07 AM   To: JENNIE [facility] Clinical Support Staff    Oneida Ingram   MRN: [id number] : 1990   Pt Home: [phone]     Entered: [phone]          Message    Appointment For: Oneida Ingram ([id number])   Visit Type: ESTABLISHED PATIENT ([id number])      2022    11:00 AM  60 mins.   OSKAR Gibson [facility]      Patient Comments:      ----------------------------------   This appointment rescheduled from:   6/3/2022     8:00 AM  60 mins.  Social Scott Gibson LCSW [facility]

## 2022-06-10 ENCOUNTER — OFFICE VISIT (OUTPATIENT)
Dept: FAMILY MEDICINE | Facility: CLINIC | Age: 32
End: 2022-06-10
Payer: COMMERCIAL

## 2022-06-10 VITALS
HEIGHT: 66 IN | TEMPERATURE: 98 F | OXYGEN SATURATION: 99 % | BODY MASS INDEX: 36.48 KG/M2 | SYSTOLIC BLOOD PRESSURE: 130 MMHG | WEIGHT: 227 LBS | DIASTOLIC BLOOD PRESSURE: 84 MMHG | HEART RATE: 75 BPM

## 2022-06-10 DIAGNOSIS — F41.9 ANXIETY: ICD-10-CM

## 2022-06-10 DIAGNOSIS — F33.1 MODERATE EPISODE OF RECURRENT MAJOR DEPRESSIVE DISORDER: ICD-10-CM

## 2022-06-10 DIAGNOSIS — R00.2 PALPITATIONS: ICD-10-CM

## 2022-06-10 DIAGNOSIS — J06.9 VIRAL URI: Primary | ICD-10-CM

## 2022-06-10 DIAGNOSIS — R50.9 FEVER, UNSPECIFIED FEVER CAUSE: ICD-10-CM

## 2022-06-10 DIAGNOSIS — R09.81 NASAL CONGESTION: ICD-10-CM

## 2022-06-10 LAB
CTP QC/QA: YES
SARS-COV-2 RDRP RESP QL NAA+PROBE: NEGATIVE

## 2022-06-10 PROCEDURE — 3008F BODY MASS INDEX DOCD: CPT | Mod: CPTII,S$GLB,, | Performed by: PHYSICIAN ASSISTANT

## 2022-06-10 PROCEDURE — U0002: ICD-10-PCS | Mod: QW,S$GLB,, | Performed by: PHYSICIAN ASSISTANT

## 2022-06-10 PROCEDURE — 1159F PR MEDICATION LIST DOCUMENTED IN MEDICAL RECORD: ICD-10-PCS | Mod: CPTII,S$GLB,, | Performed by: PHYSICIAN ASSISTANT

## 2022-06-10 PROCEDURE — 1160F RVW MEDS BY RX/DR IN RCRD: CPT | Mod: CPTII,S$GLB,, | Performed by: PHYSICIAN ASSISTANT

## 2022-06-10 PROCEDURE — 99213 PR OFFICE/OUTPT VISIT, EST, LEVL III, 20-29 MIN: ICD-10-PCS | Mod: 25,S$GLB,, | Performed by: PHYSICIAN ASSISTANT

## 2022-06-10 PROCEDURE — 1160F PR REVIEW ALL MEDS BY PRESCRIBER/CLIN PHARMACIST DOCUMENTED: ICD-10-PCS | Mod: CPTII,S$GLB,, | Performed by: PHYSICIAN ASSISTANT

## 2022-06-10 PROCEDURE — U0002 COVID-19 LAB TEST NON-CDC: HCPCS | Mod: QW,S$GLB,, | Performed by: PHYSICIAN ASSISTANT

## 2022-06-10 PROCEDURE — 99213 OFFICE O/P EST LOW 20 MIN: CPT | Mod: 25,S$GLB,, | Performed by: PHYSICIAN ASSISTANT

## 2022-06-10 PROCEDURE — 96372 PR INJECTION,THERAP/PROPH/DIAG2ST, IM OR SUBCUT: ICD-10-PCS | Mod: S$GLB,,, | Performed by: PHYSICIAN ASSISTANT

## 2022-06-10 PROCEDURE — 96372 THER/PROPH/DIAG INJ SC/IM: CPT | Mod: S$GLB,,, | Performed by: PHYSICIAN ASSISTANT

## 2022-06-10 PROCEDURE — 1159F MED LIST DOCD IN RCRD: CPT | Mod: CPTII,S$GLB,, | Performed by: PHYSICIAN ASSISTANT

## 2022-06-10 PROCEDURE — 3008F PR BODY MASS INDEX (BMI) DOCUMENTED: ICD-10-PCS | Mod: CPTII,S$GLB,, | Performed by: PHYSICIAN ASSISTANT

## 2022-06-10 RX ORDER — FLUTICASONE PROPIONATE 50 MCG
1 SPRAY, SUSPENSION (ML) NASAL DAILY
Qty: 18.2 ML | Refills: 2 | Status: SHIPPED | OUTPATIENT
Start: 2022-06-10 | End: 2023-04-25 | Stop reason: SDUPTHER

## 2022-06-10 RX ORDER — DEXAMETHASONE SODIUM PHOSPHATE 4 MG/ML
8 INJECTION, SOLUTION INTRA-ARTICULAR; INTRALESIONAL; INTRAMUSCULAR; INTRAVENOUS; SOFT TISSUE
Status: COMPLETED | OUTPATIENT
Start: 2022-06-10 | End: 2022-06-10

## 2022-06-10 RX ORDER — NORGESTIMATE AND ETHINYL ESTRADIOL 7DAYSX3 LO
1 KIT ORAL DAILY
COMMUNITY
Start: 2022-05-31

## 2022-06-10 RX ADMIN — DEXAMETHASONE SODIUM PHOSPHATE 8 MG: 4 INJECTION, SOLUTION INTRA-ARTICULAR; INTRALESIONAL; INTRAMUSCULAR; INTRAVENOUS; SOFT TISSUE at 12:06

## 2022-06-10 NOTE — PROGRESS NOTES
"  SUBJECTIVE:    Patient ID: Oneida Ingram is a 32 y.o. female.    Chief Complaint: Sinus Problem (No bottles/ sinus issues x 5 days with a cough and it feels like it is going into the chest//dp)    Pt is a 32 y.o. female who presents today for an urgent visit with a CC of "sinus problems." Prior to her symptom onset, she reports being around many children for a vacation Bible study.  Her symptoms started acutely, 5 days ago and have been persistently present since.  Originally, she experienced nasal congestion that gradually progressed into chest congestion throughout the week.  In an attempt to alleviate the symptoms, she has been taking Xyzal, Benadryl, Flonase nasal spray, and doing Neti pot treatments.  These measures provide temporary relief, but her symptoms return.  She denies any fevers, chills, myalgia, diarrhea, or cough.    She has received 2 doses of the Pfizer COVID-19 vaccine.    Office Visit on 06/10/2022   Component Date Value Ref Range Status    POC Rapid COVID 06/10/2022 Negative  Negative Final     Acceptable 06/10/2022 Yes   Final   Patient Message on 02/02/2022   Component Date Value Ref Range Status    Color, UA 02/02/2022 DARK YELLOW  YELLOW Final    Appearance, UA 02/02/2022 CLOUDY (A) CLEAR Final    Specific Gravity, UA 02/02/2022 1.028  1.001 - 1.035 Final    pH, UA 02/02/2022 < OR = 5.0  5.0 - 8.0 Final    Glucose, UA 02/02/2022 NEGATIVE  NEGATIVE Final    Bilirubin, UA 02/02/2022 NEGATIVE  NEGATIVE Final    Ketones, UA 02/02/2022 NEGATIVE  NEGATIVE Final    Occult Blood UA 02/02/2022 NEGATIVE  NEGATIVE Final    Protein, UA 02/02/2022 NEGATIVE  NEGATIVE Final    Nitrite, UA 02/02/2022 NEGATIVE  NEGATIVE Final    Leukocytes, UA 02/02/2022 NEGATIVE  NEGATIVE Final    WBC Casts, UA 02/02/2022 NONE SEEN  < OR = 5 /HPF Final    RBC Casts, UA 02/02/2022 0-2  < OR = 2 /HPF Final    Squam Epithel, UA 02/02/2022 6-10 (A) < OR = 5 /HPF Final    Bacteria, UA 02/02/2022 " NONE SEEN  NONE SEEN /HPF Final    Amorphous, UA 2022 MODERATE (A) NONE OR FEW /HPF Final    Hyaline Casts, UA 2022 NONE SEEN  NONE SEEN /LPF Final    Reflexive Urine Culture 2022    Final   Office Visit on 2022   Component Date Value Ref Range Status    POC Rapid COVID 2022 Positive (A) Negative Final     Acceptable 2022 Yes   Final    Rapid Influenza A Ag 2022 Negative  Negative Final    Rapid Influenza B Ag 2022 Negative  Negative Final     Acceptable 2022 Yes   Final       Past Medical History:   Diagnosis Date    Anxiety     MRSA pneumonia     Pneumonia      Past Surgical History:   Procedure Laterality Date    APPENDECTOMY      BRONCHOSCOPY       SECTION, LOW TRANSVERSE      DILATION AND CURETTAGE OF UTERUS N/A 2020    Procedure: DILATION AND CURETTAGE, UTERUS;  Surgeon: Srinivasan Stewart MD;  Location: [facility] OR;  Service: OB/GYN;  Laterality: N/A;    HYSTEROSCOPIC POLYPECTOMY OF UTERUS N/A 2020    Procedure: POLYPECTOMY, UTERUS, HYSTEROSCOPIC;  Surgeon: Srinivasan Stewart MD;  Location: [facility] OR;  Service: OB/GYN;  Laterality: N/A;    HYSTEROSCOPY N/A 2020    Procedure: HYSTEROSCOPY;  Surgeon: Srinivasan Stewart MD;  Location: [facility] OR;  Service: OB/GYN;  Laterality: N/A;    TONSILLECTOMY       Family History   Problem Relation Age of Onset    COPD Mother     Asthma Mother     Heart disease Mother     Heart disease Brother     Heart disease Maternal Grandfather     Diabetes Paternal Grandmother     Heart disease Paternal Grandmother     Breast cancer Paternal Aunt     Breast cancer Paternal Aunt     Breast cancer Paternal Cousin     Immunodeficiency Neg Hx     Eczema Neg Hx     Rhinitis Neg Hx        Marital Status:   Alcohol History:  reports current alcohol use.  Tobacco History:  reports that she has never smoked. She has never used smokeless tobacco.  Drug  History:  reports no history of drug use.    Health Maintenance Topics with due status: Not Due       Topic Last Completion Date    Influenza Vaccine 11/14/2019    Cervical Cancer Screening 06/09/2020     Immunization History   Administered Date(s) Administered    COVID-19, MRNA, LN-S, PF (Pfizer) (Purple Cap) 07/31/2021, 08/21/2021    Influenza (FLUBLOK) - Quadrivalent - Recombinant - PF *Preferred* (egg allergy) 11/14/2019    Pneumococcal Polysaccharide - 23 Valent 07/13/2017       Review of patient's allergies indicates:   Allergen Reactions    Azithromycin Hives    Bactrim [sulfamethoxazole-trimethoprim]      Rash     Ciprofloxacin      Doesn't remember ? Rash     Naltrexone-bupropion Nausea And Vomiting and Other (See Comments)     hands and face numbness    Penicillins Rash     Tiny raised bumps. Not urticarial- 10 years.        Current Outpatient Medications:     albuterol (PROAIR HFA) 90 mcg/actuation inhaler, Inhale 2 puffs into the lungs every 4 to 6 hours as needed., Disp: 18 g, Rfl: 0    ascorbic acid, vitamin C, (VITAMIN C) 1000 MG tablet, Take 1,000 mg by mouth., Disp: , Rfl:     aspirin (ECOTRIN) 81 MG EC tablet, Take 81 mg by mouth once daily., Disp: , Rfl:     cetirizine (ZYRTEC) 10 MG tablet, Take 10 mg by mouth once daily., Disp: , Rfl:     cinnamon bark 500 mg capsule, Take 500 mg by mouth once daily., Disp: , Rfl:     famotidine (PEPCID) 20 MG tablet, TAKE 1 TABLET BY MOUTH AT BEDTIME THE NIGHT BEFORE SURGERY AND AGAIN THE THE MORNING WITH A SIP OF WATER, Disp: , Rfl:     fluticasone (FLONASE) 50 mcg/actuation nasal spray, 1 spray by Each Nare route once daily., Disp: , Rfl:     letrozole (FEMARA) 2.5 mg Tab, TAKE 3 TABLETS BY MOUTH EVERY DAY ON CYCLE DAYS 3 THROUGH 7, Disp: , Rfl:     levocetirizine (XYZAL) 5 MG tablet, Take 1 tablet by mouth once daily., Disp: , Rfl:     levothyroxine (SYNTHROID) 100 MCG tablet, Take 1 tablet (100 mcg total) by mouth once daily., Disp: 90  tablet, Rfl: 1    magnesium 30 mg Tab, Take by mouth once., Disp: , Rfl:     metoprolol succinate (TOPROL-XL) 25 MG 24 hr tablet, Take 0.5 tablets (12.5 mg total) by mouth once daily., Disp: 45 tablet, Rfl: 0    metoprolol tartrate (LOPRESSOR) 25 MG tablet, Take 12.5 mg by mouth., Disp: , Rfl:     norgestimate-ethinyl estradioL (ORTHO TRI-CYCLEN LO) 0.18/0.215/0.25 mg-25 mcg tablet, Take 1 tablet by mouth once daily., Disp: , Rfl:     promethazine (PHENERGAN) 25 MG tablet, , Disp: , Rfl:     triamcinolone acetonide 0.1% (KENALOG) 0.1 % cream, aaa bid x 2-3 wks prn rash/itching, Disp: 80 g, Rfl: 3    UNABLE TO FIND, Take 1 tablet by mouth once daily. Methyl-balance, Disp: , Rfl:     UNABLE TO FIND, Take 4 capsules by mouth once daily. emery & Dchiro inositol, Disp: , Rfl:     UNABLE TO FIND, Take 2 capsules by mouth once daily. Calm  Sleep capsules, Disp: , Rfl:     vitamin D (VITAMIN D3) 1000 units Tab, Take 1,000 Units by mouth once daily., Disp: , Rfl:     vitamin E 400 UNIT capsule, Take 400 Units by mouth once daily., Disp: , Rfl:     zinc gluconate 50 mg tablet, Take 50 mg by mouth., Disp: , Rfl:     clonazePAM (KLONOPIN) 0.5 MG tablet, Take 1 tablet (0.5 mg total) by mouth 2 (two) times daily as needed for Anxiety., Disp: 60 tablet, Rfl: 0    fluticasone propionate (FLONASE) 50 mcg/actuation nasal spray, 1 spray (50 mcg total) by Each Nostril route once daily., Disp: 18.2 mL, Rfl: 2    folic acid (FOLVITE) 1 MG tablet, Take 4 tablets (4 mg total) by mouth once daily., Disp: 360 tablet, Rfl: 3    sertraline (ZOLOFT) 100 MG tablet, Take 2 tablets (200 mg total) by mouth once daily., Disp: 180 tablet, Rfl: 1    Current Facility-Administered Medications:     dexamethasone injection 8 mg, 8 mg, Intramuscular, 1 time in Clinic/HOD, [facility]    Review of Systems   Constitutional: Negative for activity change, chills, fatigue and fever.   HENT: Positive for congestion and sinus pressure. Negative  "for ear discharge, ear pain, postnasal drip, rhinorrhea and sore throat.    Respiratory: Negative for cough, shortness of breath and wheezing.         "Chest congestion and occasionally clearing a clear/white  phlegm."   Cardiovascular: Negative for chest pain and palpitations.   Gastrointestinal: Negative for abdominal pain, constipation, diarrhea, nausea and vomiting.   Genitourinary: Negative for difficulty urinating and dysuria.   Musculoskeletal: Negative for arthralgias and myalgias.   Neurological: Negative for dizziness, syncope, light-headedness and headaches.   Psychiatric/Behavioral: Negative for behavioral problems.          Objective:      Vitals:    06/10/22 1136   BP: 130/84   Pulse: 75   Temp: 97.7 °F (36.5 °C)   SpO2: 99%   Weight: 103 kg (227 lb)   Height: 5' 6" (1.676 m)     Physical Exam  Vitals and nursing note reviewed.   Constitutional:       General: She is not in acute distress.     Appearance: Normal appearance. She is obese. She is not ill-appearing, toxic-appearing or diaphoretic.   HENT:      Head: Normocephalic and atraumatic.      Right Ear: Tympanic membrane, ear canal and external ear normal. There is no impacted cerumen.      Left Ear: Tympanic membrane, ear canal and external ear normal. There is no impacted cerumen.      Nose: Nose normal. No rhinorrhea.      Mouth/Throat:      Mouth: Mucous membranes are moist.      Pharynx: Oropharynx is clear. No oropharyngeal exudate or posterior oropharyngeal erythema.   Eyes:      General: No scleral icterus.     Extraocular Movements: Extraocular movements intact.      Conjunctiva/sclera: Conjunctivae normal.      Pupils: Pupils are equal, round, and reactive to light.   Cardiovascular:      Rate and Rhythm: Normal rate and regular rhythm.      Pulses: Normal pulses.      Heart sounds: Normal heart sounds. No murmur heard.    No friction rub.   Pulmonary:      Effort: Pulmonary effort is normal.      Breath sounds: Normal breath sounds. No " wheezing, rhonchi or rales.      Comments: Breathing comfortably on room air with no use of respiratory accessory muscles noted on inspection.  Adequate breath sounds, with no signs of consolidation appreciated on auscultation in the diffuse bilateral lung fields.  Abdominal:      Palpations: Abdomen is soft.      Tenderness: There is no abdominal tenderness. There is no guarding or rebound.   Musculoskeletal:         General: Normal range of motion.      Cervical back: Normal range of motion and neck supple.      Left lower leg: No edema.   Lymphadenopathy:      Cervical: No cervical adenopathy.   Skin:     General: Skin is warm and dry.      Coloration: Skin is not jaundiced or pale.   Neurological:      Mental Status: She is alert. Mental status is at baseline.      Cranial Nerves: No cranial nerve deficit.      Gait: Gait normal.   Psychiatric:         Mood and Affect: Mood normal.         Behavior: Behavior normal. Behavior is cooperative.           Assessment:       1. Viral URI    2. Moderate episode of recurrent major depressive disorder    3. Palpitations    4. Anxiety    5. Fever, unspecified fever cause    6. Nasal congestion           Plan:       Viral URI  Comments:  POCT COVID-19 swab today - NEGATIVE  Continue current medication regimen as is.  Decadron 8mg IM administered today.  Start Mucinex-DM b.i.d. x10 days.  Contact the office if symptoms progress or worsen.  Orders:  -     fluticasone propionate (FLONASE) 50 mcg/actuation nasal spray; 1 spray (50 mcg total) by Each Nostril route once daily.  Dispense: 18.2 mL; Refill: 2  -     dexamethasone injection 8 mg    Moderate episode of recurrent major depressive disorder    Palpitations    Anxiety    Fever, unspecified fever cause  -     POCT COVID-19 Rapid Screening    Nasal congestion  -     POCT COVID-19 Rapid Screening      Follow up if symptoms worsen or fail to improve, for Viral URI.        6/10/2022 John Rasheed PA-C

## 2022-07-13 ENCOUNTER — OFFICE VISIT (OUTPATIENT)
Dept: FAMILY MEDICINE | Facility: CLINIC | Age: 32
End: 2022-07-13
Payer: COMMERCIAL

## 2022-07-13 VITALS
WEIGHT: 230.38 LBS | HEIGHT: 66 IN | TEMPERATURE: 98 F | DIASTOLIC BLOOD PRESSURE: 80 MMHG | OXYGEN SATURATION: 97 % | HEART RATE: 78 BPM | SYSTOLIC BLOOD PRESSURE: 130 MMHG | BODY MASS INDEX: 37.03 KG/M2

## 2022-07-13 DIAGNOSIS — F41.9 ANXIETY: Primary | ICD-10-CM

## 2022-07-13 DIAGNOSIS — J30.2 SEASONAL ALLERGIES: ICD-10-CM

## 2022-07-13 DIAGNOSIS — Z79.899 ENCOUNTER FOR LONG-TERM (CURRENT) USE OF OTHER MEDICATIONS: ICD-10-CM

## 2022-07-13 DIAGNOSIS — E28.2 PCOS (POLYCYSTIC OVARIAN SYNDROME): ICD-10-CM

## 2022-07-13 DIAGNOSIS — F33.1 MODERATE EPISODE OF RECURRENT MAJOR DEPRESSIVE DISORDER: ICD-10-CM

## 2022-07-13 PROCEDURE — 99214 OFFICE O/P EST MOD 30 MIN: CPT | Mod: S$GLB,,, | Performed by: PHYSICIAN ASSISTANT

## 2022-07-13 PROCEDURE — 3008F BODY MASS INDEX DOCD: CPT | Mod: CPTII,S$GLB,, | Performed by: PHYSICIAN ASSISTANT

## 2022-07-13 PROCEDURE — 3075F PR MOST RECENT SYSTOLIC BLOOD PRESS GE 130-139MM HG: ICD-10-PCS | Mod: CPTII,S$GLB,, | Performed by: PHYSICIAN ASSISTANT

## 2022-07-13 PROCEDURE — 1159F PR MEDICATION LIST DOCUMENTED IN MEDICAL RECORD: ICD-10-PCS | Mod: CPTII,S$GLB,, | Performed by: PHYSICIAN ASSISTANT

## 2022-07-13 PROCEDURE — 3008F PR BODY MASS INDEX (BMI) DOCUMENTED: ICD-10-PCS | Mod: CPTII,S$GLB,, | Performed by: PHYSICIAN ASSISTANT

## 2022-07-13 PROCEDURE — 3079F PR MOST RECENT DIASTOLIC BLOOD PRESSURE 80-89 MM HG: ICD-10-PCS | Mod: CPTII,S$GLB,, | Performed by: PHYSICIAN ASSISTANT

## 2022-07-13 PROCEDURE — 1160F PR REVIEW ALL MEDS BY PRESCRIBER/CLIN PHARMACIST DOCUMENTED: ICD-10-PCS | Mod: CPTII,S$GLB,, | Performed by: PHYSICIAN ASSISTANT

## 2022-07-13 PROCEDURE — 3075F SYST BP GE 130 - 139MM HG: CPT | Mod: CPTII,S$GLB,, | Performed by: PHYSICIAN ASSISTANT

## 2022-07-13 PROCEDURE — 1159F MED LIST DOCD IN RCRD: CPT | Mod: CPTII,S$GLB,, | Performed by: PHYSICIAN ASSISTANT

## 2022-07-13 PROCEDURE — 99214 PR OFFICE/OUTPT VISIT, EST, LEVL IV, 30-39 MIN: ICD-10-PCS | Mod: S$GLB,,, | Performed by: PHYSICIAN ASSISTANT

## 2022-07-13 PROCEDURE — 1160F RVW MEDS BY RX/DR IN RCRD: CPT | Mod: CPTII,S$GLB,, | Performed by: PHYSICIAN ASSISTANT

## 2022-07-13 PROCEDURE — 3079F DIAST BP 80-89 MM HG: CPT | Mod: CPTII,S$GLB,, | Performed by: PHYSICIAN ASSISTANT

## 2022-07-13 RX ORDER — CLONAZEPAM 0.5 MG/1
0.5 TABLET ORAL 2 TIMES DAILY PRN
Qty: 60 TABLET | Refills: 5 | Status: SHIPPED | OUTPATIENT
Start: 2022-07-13 | End: 2023-04-25 | Stop reason: SDUPTHER

## 2022-07-13 NOTE — PROGRESS NOTES
SUBJECTIVE:    Patient ID: Oneida Ingram is a 32 y.o. female.    Chief Complaint: Follow-up (Brought bottles/ 6 month follow up/ pna taken in 2017//dp)    Pt is a 32-year-old female who presents today for a routine 6-month follow-up.  Regarding her anxiety, she reports it is currently stable and well controlled on p.r.n. Klonopin 0.5mg. Main source of her anxiety is aging and her relationship with certain friends.  Her main support system is her family and .  She denies any increased agitation, SI, HI, or sleep disturbances.  She is currently averaging 8 hours of uninterrupted sleep/night.  She is requesting refills for Klonopin prescription today as it is currently efficacious.    Currently, she does not follow any particular diet and averages 2 meals/day.  She does not participate in any routine, regimental exercise.  BP is stable and controlled.  She denies any CP, palpitations SOB.    Dr. Arun Kemp (GYN) - manages her history of PCOS.  She started Ortho Tri Cyclen 1 month ago for contraceptive measures.  UTD Pap (06/2020) - NEGATIVE.    Lab work is UTD.     Office Visit on 06/10/2022   Component Date Value Ref Range Status    POC Rapid COVID 06/10/2022 Negative  Negative Final     Acceptable 06/10/2022 Yes   Final   Patient Message on 02/02/2022   Component Date Value Ref Range Status    Color, UA 02/02/2022 DARK YELLOW  YELLOW Final    Appearance, UA 02/02/2022 CLOUDY (A) CLEAR Final    Specific Gravity, UA 02/02/2022 1.028  1.001 - 1.035 Final    pH, UA 02/02/2022 < OR = 5.0  5.0 - 8.0 Final    Glucose, UA 02/02/2022 NEGATIVE  NEGATIVE Final    Bilirubin, UA 02/02/2022 NEGATIVE  NEGATIVE Final    Ketones, UA 02/02/2022 NEGATIVE  NEGATIVE Final    Occult Blood UA 02/02/2022 NEGATIVE  NEGATIVE Final    Protein, UA 02/02/2022 NEGATIVE  NEGATIVE Final    Nitrite, UA 02/02/2022 NEGATIVE  NEGATIVE Final    Leukocytes, UA 02/02/2022 NEGATIVE  NEGATIVE Final    WBC Casts, UA  2022 NONE SEEN  < OR = 5 /HPF Final    RBC Casts, UA 2022 0-2  < OR = 2 /HPF Final    Squam Epithel, UA 2022 6-10 (A) < OR = 5 /HPF Final    Bacteria, UA 2022 NONE SEEN  NONE SEEN /HPF Final    Amorphous, UA 2022 MODERATE (A) NONE OR FEW /HPF Final    Hyaline Casts, UA 2022 NONE SEEN  NONE SEEN /LPF Final    Reflexive Urine Culture 2022    Final   Office Visit on 2022   Component Date Value Ref Range Status    POC Rapid COVID 2022 Positive (A) Negative Final     Acceptable 2022 Yes   Final    Rapid Influenza A Ag 2022 Negative  Negative Final    Rapid Influenza B Ag 2022 Negative  Negative Final     Acceptable 2022 Yes   Final       Past Medical History:   Diagnosis Date    Anxiety     MRSA pneumonia     Pneumonia      Past Surgical History:   Procedure Laterality Date    APPENDECTOMY      BRONCHOSCOPY       SECTION, LOW TRANSVERSE      DILATION AND CURETTAGE OF UTERUS N/A 2020    Procedure: DILATION AND CURETTAGE, UTERUS;  Surgeon: Srinivasan Stewart MD;  Location: [facility] OR;  Service: OB/GYN;  Laterality: N/A;    HYSTEROSCOPIC POLYPECTOMY OF UTERUS N/A 2020    Procedure: POLYPECTOMY, UTERUS, HYSTEROSCOPIC;  Surgeon: Srinivasan Stewart MD;  Location: [facility] OR;  Service: OB/GYN;  Laterality: N/A;    HYSTEROSCOPY N/A 2020    Procedure: HYSTEROSCOPY;  Surgeon: Srinivasan Stewart MD;  Location: [facility] OR;  Service: OB/GYN;  Laterality: N/A;    TONSILLECTOMY       Family History   Problem Relation Age of Onset    COPD Mother     Asthma Mother     Heart disease Mother     Heart disease Brother     Heart disease Maternal Grandfather     Diabetes Paternal Grandmother     Heart disease Paternal Grandmother     Breast cancer Paternal Aunt     Breast cancer Paternal Aunt     Breast cancer Paternal Cousin     Immunodeficiency Neg Hx     Eczema Neg Hx     Rhinitis Neg  Hx        Marital Status:   Alcohol History:  reports current alcohol use.  Tobacco History:  reports that she has never smoked. She has never used smokeless tobacco.  Drug History:  reports no history of drug use.    Health Maintenance Topics with due status: Not Due       Topic Last Completion Date    Influenza Vaccine 11/14/2019    Cervical Cancer Screening 06/09/2020     Immunization History   Administered Date(s) Administered    COVID-19, MRNA, LN-S, PF (Pfizer) (Purple Cap) 07/31/2021, 08/21/2021    Influenza (FLUBLOK) - Quadrivalent - Recombinant - PF *Preferred* (egg allergy) 11/14/2019    Pneumococcal Polysaccharide - 23 Valent 07/13/2017       Review of patient's allergies indicates:   Allergen Reactions    Azithromycin Hives    Bactrim [sulfamethoxazole-trimethoprim]      Rash     Ciprofloxacin      Doesn't remember ? Rash     Naltrexone-bupropion Nausea And Vomiting and Other (See Comments)     hands and face numbness    Penicillins Rash     Tiny raised bumps. Not urticarial- 10 years.        Current Outpatient Medications:     albuterol (PROAIR HFA) 90 mcg/actuation inhaler, Inhale 2 puffs into the lungs every 4 to 6 hours as needed., Disp: 18 g, Rfl: 0    ascorbic acid, vitamin C, (VITAMIN C) 1000 MG tablet, Take 1,000 mg by mouth., Disp: , Rfl:     aspirin (ECOTRIN) 81 MG EC tablet, Take 81 mg by mouth once daily., Disp: , Rfl:     cetirizine (ZYRTEC) 10 MG tablet, Take 10 mg by mouth once daily., Disp: , Rfl:     cinnamon bark 500 mg capsule, Take 500 mg by mouth once daily., Disp: , Rfl:     famotidine (PEPCID) 20 MG tablet, TAKE 1 TABLET BY MOUTH AT BEDTIME THE NIGHT BEFORE SURGERY AND AGAIN THE THE MORNING WITH A SIP OF WATER, Disp: , Rfl:     fluticasone (FLONASE) 50 mcg/actuation nasal spray, 1 spray by Each Nare route once daily., Disp: , Rfl:     fluticasone propionate (FLONASE) 50 mcg/actuation nasal spray, 1 spray (50 mcg total) by Each Nostril route once daily., Disp:  18.2 mL, Rfl: 2    letrozole (FEMARA) 2.5 mg Tab, TAKE 3 TABLETS BY MOUTH EVERY DAY ON CYCLE DAYS 3 THROUGH 7, Disp: , Rfl:     levocetirizine (XYZAL) 5 MG tablet, Take 1 tablet by mouth once daily., Disp: , Rfl:     levothyroxine (SYNTHROID) 100 MCG tablet, Take 1 tablet (100 mcg total) by mouth once daily., Disp: 90 tablet, Rfl: 1    magnesium 30 mg Tab, Take by mouth once., Disp: , Rfl:     metoprolol succinate (TOPROL-XL) 25 MG 24 hr tablet, Take 0.5 tablets (12.5 mg total) by mouth once daily., Disp: 45 tablet, Rfl: 0    metoprolol tartrate (LOPRESSOR) 25 MG tablet, Take 12.5 mg by mouth., Disp: , Rfl:     norgestimate-ethinyl estradioL (ORTHO TRI-CYCLEN LO) 0.18/0.215/0.25 mg-25 mcg tablet, Take 1 tablet by mouth once daily., Disp: , Rfl:     promethazine (PHENERGAN) 25 MG tablet, , Disp: , Rfl:     triamcinolone acetonide 0.1% (KENALOG) 0.1 % cream, aaa bid x 2-3 wks prn rash/itching, Disp: 80 g, Rfl: 3    UNABLE TO FIND, Take 1 tablet by mouth once daily. Methyl-balance, Disp: , Rfl:     UNABLE TO FIND, Take 4 capsules by mouth once daily. emery & Dchiro inositol, Disp: , Rfl:     UNABLE TO FIND, Take 2 capsules by mouth once daily. Calm  Sleep capsules, Disp: , Rfl:     vitamin D (VITAMIN D3) 1000 units Tab, Take 1,000 Units by mouth once daily., Disp: , Rfl:     vitamin E 400 UNIT capsule, Take 400 Units by mouth once daily., Disp: , Rfl:     zinc gluconate 50 mg tablet, Take 50 mg by mouth., Disp: , Rfl:     clonazePAM (KLONOPIN) 0.5 MG tablet, Take 1 tablet (0.5 mg total) by mouth 2 (two) times daily as needed for Anxiety., Disp: 60 tablet, Rfl: 5    folic acid (FOLVITE) 1 MG tablet, Take 4 tablets (4 mg total) by mouth once daily., Disp: 360 tablet, Rfl: 3    sertraline (ZOLOFT) 100 MG tablet, Take 2 tablets (200 mg total) by mouth once daily., Disp: 180 tablet, Rfl: 1    Review of Systems   Constitutional: Negative for activity change, chills, fatigue and fever.   HENT: Negative for  "congestion, postnasal drip and sore throat.    Respiratory: Negative for cough, shortness of breath and wheezing.    Cardiovascular: Negative for chest pain, palpitations and leg swelling.   Gastrointestinal: Negative for abdominal pain, constipation, diarrhea, nausea and vomiting.   Genitourinary: Negative for difficulty urinating, dysuria, flank pain, frequency, hematuria and urgency.   Musculoskeletal: Negative for arthralgias and myalgias.   Neurological: Negative for dizziness, syncope, light-headedness and headaches.   Psychiatric/Behavioral: Negative for agitation, behavioral problems, sleep disturbance and suicidal ideas. The patient is not nervous/anxious.           Objective:      Vitals:    07/13/22 1105   BP: 130/80   Pulse: 78   Temp: 98.1 °F (36.7 °C)   SpO2: 97%   Weight: 104.5 kg (230 lb 6.4 oz)   Height: 5' 6" (1.676 m)     Physical Exam  Vitals and nursing note reviewed.   Constitutional:       General: She is not in acute distress.     Appearance: Normal appearance. She is obese. She is not ill-appearing, toxic-appearing or diaphoretic.      Comments: Morbidly obese WF sitting erect in office chair in no acute distress.   HENT:      Head: Normocephalic and atraumatic.      Right Ear: Tympanic membrane, ear canal and external ear normal. There is no impacted cerumen.      Left Ear: Tympanic membrane, ear canal and external ear normal. There is no impacted cerumen.      Nose: Nose normal. No rhinorrhea.      Mouth/Throat:      Mouth: Mucous membranes are moist.      Pharynx: Oropharynx is clear. No oropharyngeal exudate or posterior oropharyngeal erythema.   Eyes:      General: No scleral icterus.     Extraocular Movements: Extraocular movements intact.      Conjunctiva/sclera: Conjunctivae normal.      Pupils: Pupils are equal, round, and reactive to light.   Cardiovascular:      Rate and Rhythm: Normal rate and regular rhythm.      Pulses: Normal pulses.      Heart sounds: Normal heart sounds. No " murmur heard.    No friction rub.   Pulmonary:      Effort: Pulmonary effort is normal. No respiratory distress.      Breath sounds: Normal breath sounds. No wheezing, rhonchi or rales.      Comments: Breathing comfortably on room air with no use of respiratory accessory muscles noted on inspection.  Adequate breath sounds, with no signs of consolidation appreciated on auscultation in the diffuse bilateral lung fields.  Abdominal:      General: There is no distension.      Palpations: Abdomen is soft.      Tenderness: There is no abdominal tenderness. There is no guarding or rebound.   Musculoskeletal:         General: Normal range of motion.      Cervical back: Normal range of motion and neck supple.      Right lower leg: No edema.      Left lower leg: No edema.      Comments: 5/5 strength against resistance noted in the bilateral upper and lower extremities.   Skin:     General: Skin is warm and dry.      Coloration: Skin is not jaundiced or pale.   Neurological:      Mental Status: She is alert. Mental status is at baseline.      Cranial Nerves: No cranial nerve deficit.      Gait: Gait normal.   Psychiatric:         Mood and Affect: Mood normal.         Behavior: Behavior normal. Behavior is cooperative.         Thought Content: Thought content normal.         Judgment: Judgment normal.           Assessment:       1. Anxiety    2. Moderate episode of recurrent major depressive disorder    3. PCOS (polycystic ovarian syndrome)    4. Seasonal allergies    5. Encounter for long-term (current) use of other medications           Plan:       Anxiety  Currently stable and controlled.  Continue as is - refills sent today.  -     clonazePAM (KLONOPIN) 0.5 MG tablet; Take 1 tablet (0.5 mg total) by mouth 2 (two) times daily as needed for Anxiety.  Dispense: 60 tablet; Refill: 5    Moderate episode of recurrent major depressive disorder    PCOS (polycystic ovarian syndrome)  Currently managed by Dr. Arun Kemp  (GYN).  Stable currently on Ortho Tri-Cyclen OCP.   Continue as is.    Seasonal allergies    Encounter for long-term (current) use of other medications  Will obtain repeat lab work prior to follow-up visit.  -     Comprehensive Metabolic Panel; Future; Expected date: 07/13/2022  -     Lipid Panel; Future; Expected date: 07/13/2022  -     CBC Auto Differential; Future; Expected date: 07/13/2022  -     TSH w/reflex to FT4; Future; Expected date: 07/13/2022  -     Urinalysis, Reflex to Urine Culture Urine, Clean Catch; Future; Expected date: 07/13/2022      Follow up in about 6 months (around 1/13/2023) for Annual Wellness, With labs prior to visit.        7/13/2022 John Rasheed PA-C

## 2022-07-17 ENCOUNTER — HOSPITAL ENCOUNTER (EMERGENCY)
Facility: HOSPITAL | Age: 32
Discharge: HOME | End: 2022-07-17
Attending: EMERGENCY MEDICINE
Payer: COMMERCIAL

## 2022-07-17 VITALS
TEMPERATURE: 99 F | DIASTOLIC BLOOD PRESSURE: 73 MMHG | BODY MASS INDEX: 36.16 KG/M2 | RESPIRATION RATE: 20 BRPM | OXYGEN SATURATION: 97 % | HEIGHT: 66 IN | HEART RATE: 94 BPM | SYSTOLIC BLOOD PRESSURE: 139 MMHG | WEIGHT: 225 LBS

## 2022-07-17 DIAGNOSIS — R19.7 DIARRHEA, UNSPECIFIED TYPE: Primary | ICD-10-CM

## 2022-07-17 DIAGNOSIS — M54.50 ACUTE RIGHT-SIDED LOW BACK PAIN WITHOUT SCIATICA: ICD-10-CM

## 2022-07-17 LAB
ANION GAP SERPL CALC-SCNC: 15 MMOL/L (ref 8–16)
B-HCG UR QL: NEGATIVE
BASOPHILS # BLD AUTO: 0.03 K/UL (ref 0–0.2)
BASOPHILS NFR BLD: 0.3 % (ref 0–1.9)
BUN SERPL-MCNC: 13 MG/DL (ref 6–20)
CALCIUM SERPL-MCNC: 9.4 MG/DL (ref 8.7–10.5)
CHLORIDE SERPL-SCNC: 103 MMOL/L (ref 95–110)
CO2 SERPL-SCNC: 18 MMOL/L (ref 23–29)
CREAT SERPL-MCNC: 0.7 MG/DL (ref 0.5–1.4)
DIFFERENTIAL METHOD: ABNORMAL
EOSINOPHIL # BLD AUTO: 0 K/UL (ref 0–0.5)
EOSINOPHIL NFR BLD: 0.3 % (ref 0–8)
ERYTHROCYTE [DISTWIDTH] IN BLOOD BY AUTOMATED COUNT: 12.6 % (ref 11.5–14.5)
EST. GFR  (AFRICAN AMERICAN): >60 ML/MIN/1.73 M^2
EST. GFR  (NON AFRICAN AMERICAN): >60 ML/MIN/1.73 M^2
GLUCOSE SERPL-MCNC: 135 MG/DL (ref 70–110)
HCT VFR BLD AUTO: 40.2 % (ref 37–48.5)
HGB BLD-MCNC: 14.4 G/DL (ref 12–16)
IMM GRANULOCYTES # BLD AUTO: 0.03 K/UL (ref 0–0.04)
IMM GRANULOCYTES NFR BLD AUTO: 0.3 % (ref 0–0.5)
LYMPHOCYTES # BLD AUTO: 0.9 K/UL (ref 1–4.8)
LYMPHOCYTES NFR BLD: 7.7 % (ref 18–48)
MCH RBC QN AUTO: 30.2 PG (ref 27–31)
MCHC RBC AUTO-ENTMCNC: 35.8 G/DL (ref 32–36)
MCV RBC AUTO: 84 FL (ref 82–98)
MONOCYTES # BLD AUTO: 0.6 K/UL (ref 0.3–1)
MONOCYTES NFR BLD: 4.7 % (ref 4–15)
NEUTROPHILS # BLD AUTO: 10.4 K/UL (ref 1.8–7.7)
NEUTROPHILS NFR BLD: 86.7 % (ref 38–73)
NRBC BLD-RTO: 0 /100 WBC
PLATELET # BLD AUTO: 296 K/UL (ref 150–450)
PMV BLD AUTO: 11.2 FL (ref 9.2–12.9)
POTASSIUM SERPL-SCNC: 4 MMOL/L (ref 3.5–5.1)
RBC # BLD AUTO: 4.77 M/UL (ref 4–5.4)
SODIUM SERPL-SCNC: 136 MMOL/L (ref 136–145)
WBC # BLD AUTO: 11.99 K/UL (ref 3.9–12.7)

## 2022-07-17 PROCEDURE — 80048 BASIC METABOLIC PNL TOTAL CA: CPT | Performed by: EMERGENCY MEDICINE

## 2022-07-17 PROCEDURE — 81025 URINE PREGNANCY TEST: CPT | Performed by: EMERGENCY MEDICINE

## 2022-07-17 PROCEDURE — 36415 COLL VENOUS BLD VENIPUNCTURE: CPT | Performed by: EMERGENCY MEDICINE

## 2022-07-17 PROCEDURE — 99284 EMERGENCY DEPT VISIT MOD MDM: CPT

## 2022-07-17 PROCEDURE — 85025 COMPLETE CBC W/AUTO DIFF WBC: CPT | Performed by: EMERGENCY MEDICINE

## 2022-07-17 PROCEDURE — 25000003 PHARM REV CODE 250: Performed by: EMERGENCY MEDICINE

## 2022-07-17 RX ADMIN — SODIUM CHLORIDE 1000 ML: 0.9 INJECTION, SOLUTION INTRAVENOUS at 02:07

## 2022-07-17 NOTE — ED PROVIDER NOTES
Chief complaint:  Diarrhea (Watery with R sided pain.+nausea)      HPI:  Oneida Ingram is a 32 y.o. female with PCOS, prior appendectomy and  presenting with 10 days of intermittent, watery, nonbloody diarrhea.  Occasional, intermittent right lumbar back pain without radiation or migration.  No associated abdominal pain.  No recent fever.  No change in urination, rash, swelling.  No emesis.  No recent travel or known sick contacts.  No suspicious food or drink.  No recent antibiotic use.    ROS: As per HPI and below:  No headache, rashes, swelling, oliguria, syncope, dyspnea.    Review of patient's allergies indicates:   Allergen Reactions    Azithromycin Hives    Bactrim [sulfamethoxazole-trimethoprim]      Rash     Ciprofloxacin      Doesn't remember ? Rash     Naltrexone-bupropion Nausea And Vomiting and Other (See Comments)     hands and face numbness    Penicillins Rash     Tiny raised bumps. Not urticarial- 10 years.        Discharge Medication List as of 2022  3:22 AM      CONTINUE these medications which have NOT CHANGED    Details   albuterol (PROAIR HFA) 90 mcg/actuation inhaler Inhale 2 puffs into the lungs every 4 to 6 hours as needed., Starting 2022, Until Sat 2023 at 2359, Normal      ascorbic acid, vitamin C, (VITAMIN C) 1000 MG tablet Take 1,000 mg by mouth., Historical Med      aspirin (ECOTRIN) 81 MG EC tablet Take 81 mg by mouth once daily., Historical Med      cetirizine (ZYRTEC) 10 MG tablet Take 10 mg by mouth once daily., Historical Med      cinnamon bark 500 mg capsule Take 500 mg by mouth once daily., Historical Med      clonazePAM (KLONOPIN) 0.5 MG tablet Take 1 tablet (0.5 mg total) by mouth 2 (two) times daily as needed for Anxiety., Starting 2022, Until 2022 at 2359, Normal      famotidine (PEPCID) 20 MG tablet TAKE 1 TABLET BY MOUTH AT BEDTIME THE NIGHT BEFORE SURGERY AND AGAIN THE THE MORNING WITH A SIP OF WATER, Historical Med      !!  fluticasone (FLONASE) 50 mcg/actuation nasal spray 1 spray by Each Nare route once daily., Historical Med      !! fluticasone propionate (FLONASE) 50 mcg/actuation nasal spray 1 spray (50 mcg total) by Each Nostril route once daily., Starting Fri 6/10/2022, Normal      folic acid (FOLVITE) 1 MG tablet Take 4 tablets (4 mg total) by mouth once daily., Starting Fri 8/28/2020, Until Sat 8/28/2021, Normal      letrozole (FEMARA) 2.5 mg Tab TAKE 3 TABLETS BY MOUTH EVERY DAY ON CYCLE DAYS 3 THROUGH 7, Historical Med      levocetirizine (XYZAL) 5 MG tablet Take 1 tablet by mouth once daily., Historical Med      levothyroxine (SYNTHROID) 100 MCG tablet Take 1 tablet (100 mcg total) by mouth once daily., Starting Wed 3/9/2022, Until Thu 3/9/2023, Normal      magnesium 30 mg Tab Take by mouth once., Historical Med      metoprolol succinate (TOPROL-XL) 25 MG 24 hr tablet Take 0.5 tablets (12.5 mg total) by mouth once daily., Starting Thu 6/2/2022, Until Fri 6/2/2023, Normal      metoprolol tartrate (LOPRESSOR) 25 MG tablet Take 12.5 mg by mouth., Historical Med      norgestimate-ethinyl estradioL (ORTHO TRI-CYCLEN LO) 0.18/0.215/0.25 mg-25 mcg tablet Take 1 tablet by mouth once daily., Starting Tue 5/31/2022, Historical Med      promethazine (PHENERGAN) 25 MG tablet Starting Wed 12/8/2021, Historical Med      sertraline (ZOLOFT) 100 MG tablet Take 2 tablets (200 mg total) by mouth once daily., Starting Fri 1/14/2022, Until Thu 4/14/2022, Normal      triamcinolone acetonide 0.1% (KENALOG) 0.1 % cream aaa bid x 2-3 wks prn rash/itching, Normal      !! UNABLE TO FIND Take 1 tablet by mouth once daily. Methyl-balance, Historical Med      !! UNABLE TO FIND Take 4 capsules by mouth once daily. emery & Dchiro inositol, Historical Med      !! UNABLE TO FIND Take 2 capsules by mouth once daily. Calm  Sleep capsules, Historical Med      vitamin D (VITAMIN D3) 1000 units Tab Take 1,000 Units by mouth once daily., Historical Med       vitamin E 400 UNIT capsule Take 400 Units by mouth once daily., Historical Med      zinc gluconate 50 mg tablet Take 50 mg by mouth., Historical Med       !! - Potential duplicate medications found. Please discuss with provider.          PMH:  As per HPI and below:  Past Medical History:   Diagnosis Date    Anxiety     MRSA pneumonia     Pneumonia      Past Surgical History:   Procedure Laterality Date    APPENDECTOMY      BRONCHOSCOPY       SECTION, LOW TRANSVERSE      DILATION AND CURETTAGE OF UTERUS N/A 2020    Procedure: DILATION AND CURETTAGE, UTERUS;  Surgeon: Srinivasan Stewart MD;  Location: [facility] OR;  Service: OB/GYN;  Laterality: N/A;    HYSTEROSCOPIC POLYPECTOMY OF UTERUS N/A 2020    Procedure: POLYPECTOMY, UTERUS, HYSTEROSCOPIC;  Surgeon: Srinivasan Stewart MD;  Location: [facility] OR;  Service: OB/GYN;  Laterality: N/A;    HYSTEROSCOPY N/A 2020    Procedure: HYSTEROSCOPY;  Surgeon: Srinivasan Stewart MD;  Location: [facility] OR;  Service: OB/GYN;  Laterality: N/A;    TONSILLECTOMY         Social History     Socioeconomic History    Marital status:    Tobacco Use    Smoking status: Never Smoker    Smokeless tobacco: Never Used   Substance and Sexual Activity    Alcohol use: Yes     Comment: socially    Drug use: No    Sexual activity: Yes     Partners: Male     Birth control/protection: None   Social History Narrative    Plans from [facility]         GYN Visit & History 10 [facility]     Obesity    PCOS        Visit Summary:    Down 5 pounds    UDS/ reviewed    Reviewed labs with patient    Discussed diet and exercise        Prescriptions:    SIG: metformin 500 mg oral tablet, 30 days, Dispense #120 Tablet, 5 Refills    Directions: take once daily for 2 weeks then increase to BID for 2 weeks, then increase to 2 po BID    SIG: phentermine 37.5 mg oral tablet, 30 days, Dispense #30 Tablet, 0 Refills    Directions: Take 1 oral tablet once a day        Plan:     Return for follow up appointment in 1 month        Nancy Saravia NP     GYN Visit & History 10 [facility]     Obesity    Irregular menses    hx of ovarian cysts        Visit Summary:    PCOS panel 1 week prior to return appointment    UDS/ reviewed    Discussed diet and exercise at length        Prescriptions:    SIG: phentermine 37.5 mg oral tablet, 30 days, Dispense #30 Tablet, 0 Refills    Directions: Take 1 oral tablet once a day    SIG: Prenatal 28 mg iron- 800 mcg oral tablet, 30 days, Dispense #30 Tablet, 0 Refills    Directions: Take 1 oral tablet once a day        Plan:    Return for follow up appointment in1 month    Weight loss goal set for 8-10 pounds        Nancy Saravia NP     GYN Exam (Annual/6Wk PP)10 [facility]2016     Annual    Depression/Anxiety    Obesity    Break-through bleeding with OCP        Visit Summary    Ordered:    Blood Drawing    Pap IG, Ct-Ng, rfx HPV ASCU    CBC With Differential/Platelet    Glucose, Serum    Lipid Panel    UA w/o Micro: within normal limits    UPT: negative        Prescriptions:    SIG: NuvaRing 0.12-0.015 mg/24 hr ring,  days, Dispense #1 Ring, 3 Refills    Directions: one ring vaginally x 21 days, then remove and discard. Leave out for 7 days, then insert new ring.    Zoloft working for depression management.    Pt has a PCP for depression management, discussed counseling, exercise etc.        Return for follow up appointment in one year or prn.     GYN Visit & [id number] [facility]2016     ucs k pneumonia. w same sensitivies as k pneumonia uti in .........gb us nl...renal us nl....co persisatnt pp...and frequency and urgency and nocgturia.....modeerat...x 1 y        a-relapsing k npuemonia uti,,..diarrhea x 1 mo            p-cipro 500 mg bid x 10 d...rtc 3 w for repaeat ucs and if still [prese nt will refer to dr mccallum......stool for ova and parasite and c deficil     GYN Visit & [id number] [facility]2016     25 yo........daily cramping in  pelvis ...moderate....intermittendt and daily....duration 1 h....not worse w activity...u/s nl...on ocp.....associated w dysmen...requires advil and improves w nsaia....no dysp...no dysuria....frequency and urgency..x 1 yr, not worsening, but w apin associated with right cvat....h/o kleb uti ......sedc rate 14 on ....bm 2-4x/d, x 2 mos....sp cs...appy....ho mrsa pneumonia w intubation and blood transfusion 2 yrs....        a-pelvic p[ain, dysmenorrhea, urinary frequency and urgency....poss renal etiology...r/o gb and renal dis        p-renal and gb us...ucs.....rtc 1 wk.....consider dx lap     GYN Visit & History2016     Pelvic pain: intermittent    Contraceptive counseling    urinary tract infection        Plan: Finish Keflex.    Pt did not take doxy as prescribed.    Recommended to take the full 10 days.    Reviewed u/s and labs with patient.    Track pain, continue OCP.    Return for follow up appointment in 2 months for follow up with Dr Horan or sooner if needed.    Terazol esent if needed for yeast r/t prolonged antibiotic use.     GYN Visit & History2016     27 yo........pp x 2mos.....ocp 2-3 wk....cramping w sharp exacerbagtion...right great than left...no n/v...post coital pelvic pain....fever x 1 d...pos appy 10 yr ago        a-pelvic pain....vag dc....        p-gc chl affirm ucs cbc sed rate...doxy 100 bid x 10...us....rtc 1 w     GYN Exam (Annual/6Wk PP)101/2015     Annual    Obesity        Plan: Pap with gc/ct    Adipex refilled    Zyrtec refilled.    Diet and exercise discussed.    Return for follow up appointment one year or prn.     GYN Visit & F/     Endometritis, resolved.  Obesity.  Family history of breast cancer.    Bilateral breast ultrasound at [facility].    Prescription for Contrave.    Return to clinic in one month.     GYN Exam (Annual/6Wk PP)     Annual exam.  Contraceptive management.  Strong family history of breast cancer.  Right  breast cyst.  Endometritis.    CBC and sed.  Rate today.    Change birth control to Mircette 1 by mouth daily.    Doxycycline and Flagyl x2 weeks.    Ultrasound with radiology of bilateral breasts.    BRCA gene testing.     GYN Visit & F/U9/9/2014     Irregular menstrual cycle        Plan: Beta today    If starts menses, restart OCP.    Condom use.    Return for follow up appointment prn pending results.     GYN Visit & F/U5/5/2014     Yeast vulvovaginitis    Recent MRSA pneumonia, S/P 3 week ICU stay        Plan: Diflucan 150mg daily #1    Mycolog ointment esent    Continue PT and follow up with PCP as scheduled    Return for follow up appointment prn.     GYN Visit & F/U1/31/2014     Bacterial Vaginosis        Plan: Flagyl 500mg I PO BID, pt states she does have rx at home    Repeat diflucan.    Pt has no insurance at this time.        Return for follow up appointment prn.     GYN Visit & F/U11/19/2013     co uri...on tricycle....        o- heent adenopathy.....cx clean        a-sp luz maria 1 ..uri        p-pap...amp 500 tid.....rtc 6 mo p/p....     GYN Visit & F/U5/7/2013     cx bx hpv w/o dysplasia.....no tob...rtc 6 mos pap...Gardasil at hd     DAY GYN Visit + History4/22/2013     colpo-prob luz maria 2.....bx at 600/900.........rtc 1 wk       Family History   Problem Relation Age of Onset    COPD Mother     Asthma Mother     Heart disease Mother     Heart disease Brother     Heart disease Maternal Grandfather     Diabetes Paternal Grandmother     Heart disease Paternal Grandmother     Breast cancer Paternal Aunt     Breast cancer Paternal Aunt     Breast cancer Paternal Cousin     Immunodeficiency Neg Hx     Eczema Neg Hx     Rhinitis Neg Hx        Physical Exam:    Vitals:    07/17/22 0247   BP:    Pulse: 94   Resp:    Temp:      GENERAL:  No apparent distress.  Alert.  Large body habitus.  HEENT:  Moist mucous membranes.  Normocephalic and atraumatic.  No scleral icterus.  NECK:  No swelling.  Midline  trachea.   CARDIOVASCULAR:  Regular rate and rhythm.  2+ radial pulses.    PULMONARY:  Lungs clear to auscultation bilaterally.  No wheezes, rales, or rhonci.    ABDOMEN:  Non-tender and non-distended.    EXTREMITIES:  Warm and well perfused.  Brisk capillary refill.    NEUROLOGICAL:  Normal mental status.  Appropriate and conversant.  5/5 strength and sensation.    SKIN:  No rashes or ecchymoses.    BACK:  Mild right lumbar tenderness to palpation.  No CVA tenderness to palpation.      Labs Reviewed   CBC W/ AUTO DIFFERENTIAL - Abnormal; Notable for the following components:       Result Value    Gran # (ANC) 10.4 (*)     Lymph # 0.9 (*)     Gran % 86.7 (*)     Lymph % 7.7 (*)     All other components within normal limits   BASIC METABOLIC PANEL - Abnormal; Notable for the following components:    CO2 18 (*)     Glucose 135 (*)     All other components within normal limits   PREGNANCY TEST, URINE RAPID    Narrative:     Specimen Source->Urine       Discharge Medication List as of 7/17/2022  3:22 AM      CONTINUE these medications which have NOT CHANGED    Details   albuterol (PROAIR HFA) 90 mcg/actuation inhaler Inhale 2 puffs into the lungs every 4 to 6 hours as needed., Starting Fri 1/14/2022, Until Sat 1/14/2023 at 2359, Normal      ascorbic acid, vitamin C, (VITAMIN C) 1000 MG tablet Take 1,000 mg by mouth., Historical Med      aspirin (ECOTRIN) 81 MG EC tablet Take 81 mg by mouth once daily., Historical Med      cetirizine (ZYRTEC) 10 MG tablet Take 10 mg by mouth once daily., Historical Med      cinnamon bark 500 mg capsule Take 500 mg by mouth once daily., Historical Med      clonazePAM (KLONOPIN) 0.5 MG tablet Take 1 tablet (0.5 mg total) by mouth 2 (two) times daily as needed for Anxiety., Starting Wed 7/13/2022, Until Fri 8/12/2022 at 2359, Normal      famotidine (PEPCID) 20 MG tablet TAKE 1 TABLET BY MOUTH AT BEDTIME THE NIGHT BEFORE SURGERY AND AGAIN THE THE MORNING WITH A SIP OF WATER, Historical Med       !! fluticasone (FLONASE) 50 mcg/actuation nasal spray 1 spray by Each Nare route once daily., Historical Med      !! fluticasone propionate (FLONASE) 50 mcg/actuation nasal spray 1 spray (50 mcg total) by Each Nostril route once daily., Starting Fri 6/10/2022, Normal      folic acid (FOLVITE) 1 MG tablet Take 4 tablets (4 mg total) by mouth once daily., Starting Fri 8/28/2020, Until Sat 8/28/2021, Normal      letrozole (FEMARA) 2.5 mg Tab TAKE 3 TABLETS BY MOUTH EVERY DAY ON CYCLE DAYS 3 THROUGH 7, Historical Med      levocetirizine (XYZAL) 5 MG tablet Take 1 tablet by mouth once daily., Historical Med      levothyroxine (SYNTHROID) 100 MCG tablet Take 1 tablet (100 mcg total) by mouth once daily., Starting Wed 3/9/2022, Until Thu 3/9/2023, Normal      magnesium 30 mg Tab Take by mouth once., Historical Med      metoprolol succinate (TOPROL-XL) 25 MG 24 hr tablet Take 0.5 tablets (12.5 mg total) by mouth once daily., Starting Thu 6/2/2022, Until Fri 6/2/2023, Normal      metoprolol tartrate (LOPRESSOR) 25 MG tablet Take 12.5 mg by mouth., Historical Med      norgestimate-ethinyl estradioL (ORTHO TRI-CYCLEN LO) 0.18/0.215/0.25 mg-25 mcg tablet Take 1 tablet by mouth once daily., Starting Tue 5/31/2022, Historical Med      promethazine (PHENERGAN) 25 MG tablet Starting Wed 12/8/2021, Historical Med      sertraline (ZOLOFT) 100 MG tablet Take 2 tablets (200 mg total) by mouth once daily., Starting Fri 1/14/2022, Until Thu 4/14/2022, Normal      triamcinolone acetonide 0.1% (KENALOG) 0.1 % cream aaa bid x 2-3 wks prn rash/itching, Normal      !! UNABLE TO FIND Take 1 tablet by mouth once daily. Methyl-balance, Historical Med      !! UNABLE TO FIND Take 4 capsules by mouth once daily. emery & Dchiro inositol, Historical Med      !! UNABLE TO FIND Take 2 capsules by mouth once daily. Calm  Sleep capsules, Historical Med      vitamin D (VITAMIN D3) 1000 units Tab Take 1,000 Units by mouth once daily., Historical Med       vitamin E 400 UNIT capsule Take 400 Units by mouth once daily., Historical Med      zinc gluconate 50 mg tablet Take 50 mg by mouth., Historical Med       !! - Potential duplicate medications found. Please discuss with provider.          Orders Placed This Encounter   Procedures    Pregnancy, urine rapid    CBC Auto Differential    Basic Metabolic Panel    Vital signs    Insert peripheral IV       Imaging Results    None              MDM:    32 y.o. female with 10 days of intermittent, watery diarrhea.  Patient additionally complains of right lumbar back pain.  This is not in the flank and is reproducible.  I have very low suspicion for emergent, life-threatening intra-abdominal process such as obstructive uropathy, appendicitis, abscess.  I do not think further abdominal imaging is indicated. The patient has a nonfocal neurological examination with no red flags.  I doubt acute spinal cord processes requiring further spinal imaging such as CT or MRI.  I doubt epidural abscesses, severe deep space infection, transverse myelitis, discitis, cauda equina syndrome, or other emergent conditions.      Laboratory sent with IV fluids given at patient request.  There is no JAVID or severe dehydration or electrolyte derangement.  Low suspicion for bacterial infectious etiology at this point and I do not think empiric antibiotics are indicated.  I will refer to GI for further workup.  Return precautions reviewed.    Diagnoses:    1. Diarrhea  2. R lumbar back pain     Roge Salazar MD  07/17/22 2983

## 2022-07-18 ENCOUNTER — TELEPHONE (OUTPATIENT)
Dept: FAMILY MEDICINE | Facility: CLINIC | Age: 32
End: 2022-07-18

## 2022-07-18 DIAGNOSIS — R11.2 NAUSEA AND VOMITING, INTRACTABILITY OF VOMITING NOT SPECIFIED, UNSPECIFIED VOMITING TYPE: Primary | ICD-10-CM

## 2022-07-18 DIAGNOSIS — R10.31 RLQ ABDOMINAL PAIN: ICD-10-CM

## 2022-07-18 RX ORDER — ONDANSETRON 4 MG/1
4 TABLET, ORALLY DISINTEGRATING ORAL EVERY 8 HOURS PRN
Qty: 30 TABLET | Refills: 0 | Status: SHIPPED | OUTPATIENT
Start: 2022-07-18 | End: 2023-01-11

## 2022-07-18 NOTE — TELEPHONE ENCOUNTER
Upon chart review of her recent ER visit, WBC count in within normal limits, but is on the upper end of normal.   I recommend completing a repeat CBC to trend this WBC count and a BMP to assess electrolyte status as soon as possible. Also, complete an U/S of the RLQ. Both of these orders have been placed.   If pain is severe or fever is noted, patient needs to report to the ER.   I will sent a Rx of Zofran to the pharmacy listed to help alleviate her nausea and vomiting.

## 2022-07-18 NOTE — TELEPHONE ENCOUNTER
----- Message from Nan Mccoy sent at 7/18/2022  8:33 AM CDT -----  Pt want to be seen asap. Pt was in the [facility] ER this weekend. Please advise. Pt #[phone]

## 2022-07-18 NOTE — TELEPHONE ENCOUNTER
Spoke with Pt wants to come into the office today. Went to ER over the weekend for ABD pain. Pt states she is having intermittent abd pain on lower right side. C/o N/V/D. Pt states ER refused to do any imaging. She is concerned the has a kidney stone. No appts available today please advise.

## 2022-07-20 ENCOUNTER — PATIENT MESSAGE (OUTPATIENT)
Dept: FAMILY MEDICINE | Facility: CLINIC | Age: 32
End: 2022-07-20

## 2022-07-20 LAB
BASOPHILS # BLD AUTO: 20 CELLS/UL (ref 0–200)
BASOPHILS NFR BLD AUTO: 0.3 %
BUN SERPL-MCNC: 11 MG/DL (ref 7–25)
BUN/CREAT SERPL: NORMAL (CALC) (ref 6–22)
CALCIUM SERPL-MCNC: 9.6 MG/DL (ref 8.6–10.2)
CHLORIDE SERPL-SCNC: 103 MMOL/L (ref 98–110)
CO2 SERPL-SCNC: 27 MMOL/L (ref 20–32)
CREAT SERPL-MCNC: 0.58 MG/DL (ref 0.5–0.97)
EGFR: 124 ML/MIN/1.73M2
EOSINOPHIL # BLD AUTO: 127 CELLS/UL (ref 15–500)
EOSINOPHIL NFR BLD AUTO: 1.9 %
ERYTHROCYTE [DISTWIDTH] IN BLOOD BY AUTOMATED COUNT: 12.6 % (ref 11–15)
GLUCOSE SERPL-MCNC: 93 MG/DL (ref 65–99)
HCT VFR BLD AUTO: 41.1 % (ref 35–45)
HGB BLD-MCNC: 14 G/DL (ref 11.7–15.5)
LYMPHOCYTES # BLD AUTO: 2781 CELLS/UL (ref 850–3900)
LYMPHOCYTES NFR BLD AUTO: 41.5 %
MCH RBC QN AUTO: 29.6 PG (ref 27–33)
MCHC RBC AUTO-ENTMCNC: 34.1 G/DL (ref 32–36)
MCV RBC AUTO: 86.9 FL (ref 80–100)
MONOCYTES # BLD AUTO: 496 CELLS/UL (ref 200–950)
MONOCYTES NFR BLD AUTO: 7.4 %
NEUTROPHILS # BLD AUTO: 3276 CELLS/UL (ref 1500–7800)
NEUTROPHILS NFR BLD AUTO: 48.9 %
PLATELET # BLD AUTO: 316 THOUSAND/UL (ref 140–400)
PMV BLD REES-ECKER: 11.2 FL (ref 7.5–12.5)
POTASSIUM SERPL-SCNC: 3.8 MMOL/L (ref 3.5–5.3)
RBC # BLD AUTO: 4.73 MILLION/UL (ref 3.8–5.1)
SODIUM SERPL-SCNC: 139 MMOL/L (ref 135–146)
WBC # BLD AUTO: 6.7 THOUSAND/UL (ref 3.8–10.8)

## 2022-07-20 NOTE — TELEPHONE ENCOUNTER
Proceed with ultrasound as scheduled. If pain is severe or fever is noted, patient needs to report to the ER.

## 2022-07-21 ENCOUNTER — TELEPHONE (OUTPATIENT)
Dept: FAMILY MEDICINE | Facility: CLINIC | Age: 32
End: 2022-07-21

## 2022-07-21 NOTE — TELEPHONE ENCOUNTER
----- Message from BERTHA Fair sent at 7/20/2022  5:41 PM CDT -----  Please contact patient and inform her that her lab work was reviewed.  WBC count is within normal limits.  No signs of anemia noted.  Kidney function and electrolyte status are within normal limits.

## 2022-07-22 ENCOUNTER — PATIENT MESSAGE (OUTPATIENT)
Dept: FAMILY MEDICINE | Facility: CLINIC | Age: 32
End: 2022-07-22

## 2022-07-26 ENCOUNTER — PATIENT MESSAGE (OUTPATIENT)
Dept: FAMILY MEDICINE | Facility: CLINIC | Age: 32
End: 2022-07-26

## 2022-07-26 ENCOUNTER — HOSPITAL ENCOUNTER (OUTPATIENT)
Dept: RADIOLOGY | Facility: HOSPITAL | Age: 32
Discharge: HOME | End: 2022-07-26
Attending: PHYSICIAN ASSISTANT
Payer: COMMERCIAL

## 2022-07-26 DIAGNOSIS — R10.31 RLQ ABDOMINAL PAIN: ICD-10-CM

## 2022-07-26 PROCEDURE — 76705 ECHO EXAM OF ABDOMEN: CPT | Mod: TC,PO

## 2022-07-27 ENCOUNTER — TELEPHONE (OUTPATIENT)
Dept: FAMILY MEDICINE | Facility: CLINIC | Age: 32
End: 2022-07-27

## 2022-07-27 DIAGNOSIS — R10.84 ABDOMINAL PAIN, DIFFUSE: Primary | ICD-10-CM

## 2022-07-27 NOTE — TELEPHONE ENCOUNTER
Spoke with pt in regards to recent ultrasounds results. Verbalized per John that  her ultrasound reveals no abnormalities.  There are no masses or fluid collections identified.  No free fluid is observed. Pt acknowledge understanding.    Pt is upset. Pt states that she had sent a message on the portal in regards to the abdominal pain that she was having before she had he ultrasound done and stated that it was her left lower abdominal area that was having pain and the u/s was limited only to right lower abdominal area.

## 2022-07-27 NOTE — TELEPHONE ENCOUNTER
----- Message from BERTHA Fair sent at 7/26/2022  5:37 PM CDT -----  Please contact patient and inform her that her ultrasound reveals no abnormalities.  There are no masses or fluid collections identified.  No free fluid is observed.

## 2022-07-27 NOTE — TELEPHONE ENCOUNTER
Spoke with pt in regards to message sent. Verbalized per John that we can get a CT scan of the abdomen to determine what could be the cause of her pain. If she defers, we can refer her to a GI specialist for further work-up. Pt acknowledge understanding. Pt would like to get the CT scan.

## 2022-07-27 NOTE — TELEPHONE ENCOUNTER
----- Message from Liz Ramachandran sent at 7/27/2022 11:54 AM CDT -----   12:06   She got a call about her U/S results. She has some questions..pt's #  [phone] GH

## 2022-07-27 NOTE — TELEPHONE ENCOUNTER
Spoke with pt in [facility] to message sent. Please see other telephone encounter for today.

## 2022-07-27 NOTE — TELEPHONE ENCOUNTER
If patient would like, we can get a CT scan of the abdomen to determine what could be the cause of her pain. If she defers, we can refer her to a GI specialist for further work-up.

## 2022-08-12 ENCOUNTER — HOSPITAL ENCOUNTER (OUTPATIENT)
Dept: RADIOLOGY | Facility: HOSPITAL | Age: 32
Discharge: HOME | End: 2022-08-12
Attending: PHYSICIAN ASSISTANT
Payer: COMMERCIAL

## 2022-08-12 DIAGNOSIS — R10.84 ABDOMINAL PAIN, DIFFUSE: ICD-10-CM

## 2022-08-12 PROCEDURE — 74176 CT ABD & PELVIS W/O CONTRAST: CPT | Mod: TC,PO

## 2022-08-15 ENCOUNTER — TELEPHONE (OUTPATIENT)
Dept: FAMILY MEDICINE | Facility: CLINIC | Age: 32
End: 2022-08-15

## 2022-08-15 ENCOUNTER — PATIENT MESSAGE (OUTPATIENT)
Dept: FAMILY MEDICINE | Facility: CLINIC | Age: 32
End: 2022-08-15

## 2022-08-15 NOTE — TELEPHONE ENCOUNTER
----- Message from BERTHA Fair sent at 8/12/2022  2:23 PM CDT -----  Please contact patient and inform her that her CT abdomen/pelvis was reviewed.  A 6.2 cm right cystic appearing  structure is noted, suggesting a possible ovarian cyst.  Mild, fatty liver infiltration is noted.  Otherwise, no other significant findings were evident.  To further assess this possible ovarian cysts, I recommend completing a transvaginal ultrasound and following up with GYN.  Please as patient if she would like for me to order a transvaginal ultrasound, or if she would prefer having this study completed with her GYN.

## 2022-08-16 NOTE — TELEPHONE ENCOUNTER
Spoke with pt in regards to recent Ct. Scan results. Please see other telephone encounter on 08/15/2022

## 2022-08-16 NOTE — TELEPHONE ENCOUNTER
Spoke with pt in regards to recent CT scan. Verbalized per John that her Ct scan of the abdomen/pelvis was reviewed.  A 6.2 cm right cystic appearing  structure is noted, suggesting a possible ovarian cyst.  Mild, fatty liver infiltration is noted.  Otherwise, no other significant findings were evident.  To further assess this possible ovarian cysts, I recommend completing a transvaginal ultrasound and following up with GYN.  Please as patient if she would like for me to order a transvaginal ultrasound, or if she would prefer having this study completed with her GYN. Pt acknowledge understanding.     Pt states that she had to go to the ER at [facility] on 08/14/2022 for abdominal pain. Pt states that the ovarian cyst rupture. Pt was given antibiotic, nausea medication and pain medication. Pt states that she is feeling better.

## 2022-08-25 ENCOUNTER — PATIENT MESSAGE (OUTPATIENT)
Dept: FAMILY MEDICINE | Facility: CLINIC | Age: 32
End: 2022-08-25

## 2022-08-26 ENCOUNTER — OFFICE VISIT (OUTPATIENT)
Dept: FAMILY MEDICINE | Facility: CLINIC | Age: 32
End: 2022-08-26
Payer: COMMERCIAL

## 2022-08-26 VITALS
TEMPERATURE: 98 F | OXYGEN SATURATION: 99 % | BODY MASS INDEX: 35.52 KG/M2 | HEIGHT: 66 IN | HEART RATE: 95 BPM | SYSTOLIC BLOOD PRESSURE: 130 MMHG | WEIGHT: 221 LBS | DIASTOLIC BLOOD PRESSURE: 84 MMHG

## 2022-08-26 DIAGNOSIS — J06.9 BACTERIAL URI: Primary | ICD-10-CM

## 2022-08-26 DIAGNOSIS — E28.2 PCOS (POLYCYSTIC OVARIAN SYNDROME): ICD-10-CM

## 2022-08-26 DIAGNOSIS — F41.9 ANXIETY: ICD-10-CM

## 2022-08-26 DIAGNOSIS — B96.89 BACTERIAL URI: Primary | ICD-10-CM

## 2022-08-26 DIAGNOSIS — F33.1 MODERATE EPISODE OF RECURRENT MAJOR DEPRESSIVE DISORDER: ICD-10-CM

## 2022-08-26 PROCEDURE — 3075F SYST BP GE 130 - 139MM HG: CPT | Mod: CPTII,S$GLB,, | Performed by: PHYSICIAN ASSISTANT

## 2022-08-26 PROCEDURE — 3079F PR MOST RECENT DIASTOLIC BLOOD PRESSURE 80-89 MM HG: ICD-10-PCS | Mod: CPTII,S$GLB,, | Performed by: PHYSICIAN ASSISTANT

## 2022-08-26 PROCEDURE — 1160F RVW MEDS BY RX/DR IN RCRD: CPT | Mod: CPTII,S$GLB,, | Performed by: PHYSICIAN ASSISTANT

## 2022-08-26 PROCEDURE — 1159F PR MEDICATION LIST DOCUMENTED IN MEDICAL RECORD: ICD-10-PCS | Mod: CPTII,S$GLB,, | Performed by: PHYSICIAN ASSISTANT

## 2022-08-26 PROCEDURE — 99213 OFFICE O/P EST LOW 20 MIN: CPT | Mod: S$GLB,,, | Performed by: PHYSICIAN ASSISTANT

## 2022-08-26 PROCEDURE — 1159F MED LIST DOCD IN RCRD: CPT | Mod: CPTII,S$GLB,, | Performed by: PHYSICIAN ASSISTANT

## 2022-08-26 PROCEDURE — 3008F BODY MASS INDEX DOCD: CPT | Mod: CPTII,S$GLB,, | Performed by: PHYSICIAN ASSISTANT

## 2022-08-26 PROCEDURE — 99213 PR OFFICE/OUTPT VISIT, EST, LEVL III, 20-29 MIN: ICD-10-PCS | Mod: S$GLB,,, | Performed by: PHYSICIAN ASSISTANT

## 2022-08-26 PROCEDURE — 3075F PR MOST RECENT SYSTOLIC BLOOD PRESS GE 130-139MM HG: ICD-10-PCS | Mod: CPTII,S$GLB,, | Performed by: PHYSICIAN ASSISTANT

## 2022-08-26 PROCEDURE — 3008F PR BODY MASS INDEX (BMI) DOCUMENTED: ICD-10-PCS | Mod: CPTII,S$GLB,, | Performed by: PHYSICIAN ASSISTANT

## 2022-08-26 PROCEDURE — 3079F DIAST BP 80-89 MM HG: CPT | Mod: CPTII,S$GLB,, | Performed by: PHYSICIAN ASSISTANT

## 2022-08-26 PROCEDURE — 1160F PR REVIEW ALL MEDS BY PRESCRIBER/CLIN PHARMACIST DOCUMENTED: ICD-10-PCS | Mod: CPTII,S$GLB,, | Performed by: PHYSICIAN ASSISTANT

## 2022-08-26 RX ORDER — DOXYCYCLINE 100 MG/1
100 CAPSULE ORAL 2 TIMES DAILY
Qty: 14 CAPSULE | Refills: 0 | Status: SHIPPED | OUTPATIENT
Start: 2022-08-26 | End: 2022-09-02

## 2022-08-26 RX ORDER — BENZONATATE 200 MG/1
200 CAPSULE ORAL 3 TIMES DAILY PRN
Qty: 90 CAPSULE | Refills: 0 | Status: SHIPPED | OUTPATIENT
Start: 2022-08-26 | End: 2022-09-05

## 2022-08-26 RX ORDER — ESCITALOPRAM OXALATE 10 MG/1
10 TABLET ORAL DAILY
COMMUNITY
End: 2023-04-25 | Stop reason: SDUPTHER

## 2022-08-26 NOTE — PROGRESS NOTES
"  SUBJECTIVE:    Patient ID: Oneida Ingram is a 32 y.o. female.    Chief Complaint: Cough (Patient tested negative for C-19 last night // brought bottles // History of pneumonia //abc )    Pt is a 32-year-old female who presents today for a sick visit with a CC of "a cough" that started acutely Tuesday.  Her cough is productive of thick, green phlegm.  It tends to be intermittent in nature and is interfering with her sleep.  She is averaging only 4 hours of sleep/night at this time.  Accompanied with the symptoms, she reports a fever that registered 101.0° F last night, chills, night sweats, and a decreased appetite.  In an attempt to alleviate the symptoms, she started OTC Mucinex yesterday, but has not experience significant relief.  She reports taking a home COVID-19 test yesterday and reports it was NEGATIVE.     She has a history of pneumonia that required hospitalization in the past.    Telephone on 07/18/2022   Component Date Value Ref Range Status    WBC 07/19/2022 6.7  3.8 - 10.8 Thousand/uL Final    RBC 07/19/2022 4.73  3.80 - 5.10 Million/uL Final    Hemoglobin 07/19/2022 14.0  11.7 - 15.5 g/dL Final    Hematocrit 07/19/2022 41.1  35.0 - 45.0 % Final    MCV 07/19/2022 86.9  80.0 - 100.0 fL Final    MCH 07/19/2022 29.6  27.0 - 33.0 pg Final    MCHC 07/19/2022 34.1  32.0 - 36.0 g/dL Final    RDW 07/19/2022 12.6  11.0 - 15.0 % Final    Platelets 07/19/2022 316  140 - 400 Thousand/uL Final    MPV 07/19/2022 11.2  7.5 - 12.5 fL Final    Neutrophils, Abs 07/19/2022 3,276  1,500 - 7,800 cells/uL Final    Lymph # 07/19/2022 2,781  850 - 3,900 cells/uL Final    Mono # 07/19/2022 496  200 - 950 cells/uL Final    Eos # 07/19/2022 127  15 - 500 cells/uL Final    Baso # 07/19/2022 20  0 - 200 cells/uL Final    Neutrophils Relative 07/19/2022 48.9  % Final    Lymph % 07/19/2022 41.5  % Final    Mono % 07/19/2022 7.4  % Final    Eosinophil % 07/19/2022 1.9  % Final    Basophil % 07/19/2022 0.3  % Final "    Glucose 07/19/2022 93  65 - 99 mg/dL Final    BUN 07/19/2022 11  7 - 25 mg/dL Final    Creatinine 07/19/2022 0.58  0.50 - 0.97 mg/dL Final    EGFR 07/19/2022 124  > OR = 60 mL/min/1.73m2 Final    BUN/Creatinine Ratio 07/19/2022 NOT APPLICABLE  6 - 22 (calc) Final    Sodium 07/19/2022 139  135 - 146 mmol/L Final    Potassium 07/19/2022 3.8  3.5 - 5.3 mmol/L Final    Chloride 07/19/2022 103  98 - 110 mmol/L Final    CO2 07/19/2022 27  20 - 32 mmol/L Final    Calcium 07/19/2022 9.6  8.6 - 10.2 mg/dL Final   Admission on 07/17/2022, Discharged on 07/17/2022   Component Date Value Ref Range Status    Preg Test, Ur 07/17/2022 Negative   Final    WBC 07/17/2022 11.99  3.90 - 12.70 K/uL Final    RBC 07/17/2022 4.77  4.00 - 5.40 M/uL Final    Hemoglobin 07/17/2022 14.4  12.0 - 16.0 g/dL Final    Hematocrit 07/17/2022 40.2  37.0 - 48.5 % Final    MCV 07/17/2022 84  82 - 98 fL Final    MCH 07/17/2022 30.2  27.0 - 31.0 pg Final    MCHC 07/17/2022 35.8  32.0 - 36.0 g/dL Final    RDW 07/17/2022 12.6  11.5 - 14.5 % Final    Platelets 07/17/2022 296  150 - 450 K/uL Final    MPV 07/17/2022 11.2  9.2 - 12.9 fL Final    Immature Granulocytes 07/17/2022 0.3  0.0 - 0.5 % Final    Gran # (ANC) 07/17/2022 10.4 (A) 1.8 - 7.7 K/uL Final    Immature Grans (Abs) 07/17/2022 0.03  0.00 - 0.04 K/uL Final    Lymph # 07/17/2022 0.9 (A) 1.0 - 4.8 K/uL Final    Mono # 07/17/2022 0.6  0.3 - 1.0 K/uL Final    Eos # 07/17/2022 0.0  0.0 - 0.5 K/uL Final    Baso # 07/17/2022 0.03  0.00 - 0.20 K/uL Final    nRBC 07/17/2022 0  0 /100 WBC Final    Gran % 07/17/2022 86.7 (A) 38.0 - 73.0 % Final    Lymph % 07/17/2022 7.7 (A) 18.0 - 48.0 % Final    Mono % 07/17/2022 4.7  4.0 - 15.0 % Final    Eosinophil % 07/17/2022 0.3  0.0 - 8.0 % Final    Basophil % 07/17/2022 0.3  0.0 - 1.9 % Final    Differential Method 07/17/2022 Automated   Final    Sodium 07/17/2022 136  136 - 145 mmol/L Final    Potassium 07/17/2022 4.0  3.5 -  5.1 mmol/L Final    Chloride 2022 103  95 - 110 mmol/L Final    CO2 2022 18 (A) 23 - 29 mmol/L Final    Glucose 2022 135 (A) 70 - 110 mg/dL Final    BUN 2022 13  6 - 20 mg/dL Final    Creatinine 2022 0.7  0.5 - 1.4 mg/dL Final    Calcium 2022 9.4  8.7 - 10.5 mg/dL Final    Anion Gap 2022 15  8 - 16 mmol/L Final    eGFR if African American 2022 >60  >60 mL/min/1.73 m^2 Final    eGFR if non African American 2022 >60  >60 mL/min/1.73 m^2 Final   Office Visit on 06/10/2022   Component Date Value Ref Range Status    POC Rapid COVID 06/10/2022 Negative  Negative Final     Acceptable 06/10/2022 Yes   Final       Past Medical History:   Diagnosis Date    Anxiety     MRSA pneumonia     Pneumonia      Past Surgical History:   Procedure Laterality Date    APPENDECTOMY      BRONCHOSCOPY       SECTION, LOW TRANSVERSE      DILATION AND CURETTAGE OF UTERUS N/A 2020    Procedure: DILATION AND CURETTAGE, UTERUS;  Surgeon: Srinivasan Stewart MD;  Location: [facility] OR;  Service: OB/GYN;  Laterality: N/A;    HYSTEROSCOPIC POLYPECTOMY OF UTERUS N/A 2020    Procedure: POLYPECTOMY, UTERUS, HYSTEROSCOPIC;  Surgeon: Srinivasan Stewart MD;  Location: [facility] OR;  Service: OB/GYN;  Laterality: N/A;    HYSTEROSCOPY N/A 2020    Procedure: HYSTEROSCOPY;  Surgeon: Srinivasan Stewart MD;  Location: [facility] OR;  Service: OB/GYN;  Laterality: N/A;    TONSILLECTOMY       Family History   Problem Relation Age of Onset    COPD Mother     Asthma Mother     Heart disease Mother     Heart disease Brother     Heart disease Maternal Grandfather     Diabetes Paternal Grandmother     Heart disease Paternal Grandmother     Breast cancer Paternal Aunt     Breast cancer Paternal Aunt     Breast cancer Paternal Cousin     Immunodeficiency Neg Hx     Eczema Neg Hx     Rhinitis Neg Hx        Marital Status:   Alcohol History:  reports  current alcohol use.  Tobacco History:  reports that she has never smoked. She has never used smokeless tobacco.  Drug History:  reports no history of drug use.    Health Maintenance Topics with due status: Not Due       Topic Last Completion Date    Influenza Vaccine 11/14/2019    Cervical Cancer Screening 06/09/2020     Immunization History   Administered Date(s) Administered    COVID-19, MRNA, LN-S, PF (Pfizer) (Purple Cap) 07/31/2021, 08/21/2021    Influenza (FLUBLOK) - Quadrivalent - Recombinant - PF *Preferred* (egg allergy) 11/14/2019    Pneumococcal Polysaccharide - 23 Valent 07/13/2017       Review of patient's allergies indicates:   Allergen Reactions    Azithromycin Hives    Bactrim [sulfamethoxazole-trimethoprim]      Rash     Ciprofloxacin      Doesn't remember ? Rash     Naltrexone-bupropion Nausea And Vomiting and Other (See Comments)     hands and face numbness    Penicillins Rash     Tiny raised bumps. Not urticarial- 10 years.        Current Outpatient Medications:     albuterol (PROAIR HFA) 90 mcg/actuation inhaler, Inhale 2 puffs into the lungs every 4 to 6 hours as needed., Disp: 18 g, Rfl: 0    ascorbic acid, vitamin C, (VITAMIN C) 1000 MG tablet, Take 1,000 mg by mouth., Disp: , Rfl:     clonazePAM (KLONOPIN) 0.5 MG tablet, Take 1 tablet (0.5 mg total) by mouth 2 (two) times daily as needed for Anxiety., Disp: 60 tablet, Rfl: 5    EScitalopram oxalate (LEXAPRO) 10 MG tablet, Take 10 mg by mouth once daily., Disp: , Rfl:     fluticasone (FLONASE) 50 mcg/actuation nasal spray, 1 spray by Each Nare route once daily., Disp: , Rfl:     fluticasone propionate (FLONASE) 50 mcg/actuation nasal spray, 1 spray (50 mcg total) by Each Nostril route once daily., Disp: 18.2 mL, Rfl: 2    levocetirizine (XYZAL) 5 MG tablet, Take 1 tablet by mouth once daily., Disp: , Rfl:     metoprolol succinate (TOPROL-XL) 25 MG 24 hr tablet, Take 0.5 tablets (12.5 mg total) by mouth once daily., Disp: 45  tablet, Rfl: 0    norgestimate-ethinyl estradioL (ORTHO TRI-CYCLEN LO) 0.18/0.215/0.25 mg-25 mcg tablet, Take 1 tablet by mouth once daily., Disp: , Rfl:     ondansetron (ZOFRAN-ODT) 4 MG TbDL, Take 1 tablet (4 mg total) by mouth every 8 (eight) hours as needed., Disp: 30 tablet, Rfl: 0    sertraline (ZOLOFT) 100 MG tablet, Take 2 tablets (200 mg total) by mouth once daily., Disp: 180 tablet, Rfl: 1    vitamin D (VITAMIN D3) 1000 units Tab, Take 1,000 Units by mouth once daily., Disp: , Rfl:     zinc gluconate 50 mg tablet, Take 50 mg by mouth., Disp: , Rfl:     aspirin (ECOTRIN) 81 MG EC tablet, Take 81 mg by mouth once daily., Disp: , Rfl:     benzonatate (TESSALON) 200 MG capsule, Take 1 capsule (200 mg total) by mouth 3 (three) times daily as needed for Cough., Disp: 90 capsule, Rfl: 0    cetirizine (ZYRTEC) 10 MG tablet, Take 10 mg by mouth once daily., Disp: , Rfl:     cinnamon bark 500 mg capsule, Take 500 mg by mouth once daily., Disp: , Rfl:     doxycycline (VIBRAMYCIN) 100 MG Cap, Take 1 capsule (100 mg total) by mouth 2 (two) times daily. for 7 days, Disp: 14 capsule, Rfl: 0    famotidine (PEPCID) 20 MG tablet, TAKE 1 TABLET BY MOUTH AT BEDTIME THE NIGHT BEFORE SURGERY AND AGAIN THE THE MORNING WITH A SIP OF WATER, Disp: , Rfl:     letrozole (FEMARA) 2.5 mg Tab, TAKE 3 TABLETS BY MOUTH EVERY DAY ON CYCLE DAYS 3 THROUGH 7, Disp: , Rfl:     magnesium 30 mg Tab, Take by mouth once., Disp: , Rfl:     promethazine (PHENERGAN) 25 MG tablet, , Disp: , Rfl:     UNABLE TO FIND, Take 1 tablet by mouth once daily. Methyl-balance, Disp: , Rfl:     UNABLE TO FIND, Take 4 capsules by mouth once daily. emery & Dchiro inositol, Disp: , Rfl:     UNABLE TO FIND, Take 2 capsules by mouth once daily. Calm  Sleep capsules, Disp: , Rfl:     vitamin E 400 UNIT capsule, Take 400 Units by mouth once daily., Disp: , Rfl:     Review of Systems   Constitutional: Positive for appetite change (Decreased), chills,  "diaphoresis and fever. Negative for activity change and fatigue.   HENT: Negative for congestion and sore throat.    Respiratory: Positive for cough. Negative for shortness of breath and wheezing.         "Chest congestion."   Cardiovascular: Negative for chest pain and palpitations.   Gastrointestinal: Negative for abdominal pain, constipation, diarrhea, nausea and vomiting.   Genitourinary: Negative for dysuria.   Musculoskeletal: Negative for arthralgias, gait problem and myalgias.   Neurological: Negative for dizziness, syncope and light-headedness.   Psychiatric/Behavioral: Negative for behavioral problems.          Objective:      Vitals:    08/26/22 1104   BP: 130/84   Pulse: 95   Temp: 98.2 °F (36.8 °C)   SpO2: 99%   Weight: 100.2 kg (221 lb)   Height: 5' 6" (1.676 m)     Physical Exam  Vitals and nursing note reviewed.   Constitutional:       General: She is not in acute distress.     Appearance: Normal appearance. She is obese. She is not ill-appearing, toxic-appearing or diaphoretic.      Comments: Morbidly obese WF sitting erect in an office chair in no acute distress.   HENT:      Head: Normocephalic and atraumatic.      Right Ear: Tympanic membrane, ear canal and external ear normal. There is no impacted cerumen.      Left Ear: Tympanic membrane, ear canal and external ear normal. There is no impacted cerumen.      Nose: Nose normal. No rhinorrhea.      Mouth/Throat:      Mouth: Mucous membranes are moist.      Pharynx: Oropharynx is clear. No oropharyngeal exudate or posterior oropharyngeal erythema.   Eyes:      General: No scleral icterus.     Extraocular Movements: Extraocular movements intact.      Conjunctiva/sclera: Conjunctivae normal.      Pupils: Pupils are equal, round, and reactive to light.   Cardiovascular:      Rate and Rhythm: Normal rate and regular rhythm.      Pulses: Normal pulses.      Heart sounds: Normal heart sounds. No murmur heard.    No friction rub.   Pulmonary:      Effort: " Pulmonary effort is normal. No respiratory distress.      Breath sounds: Normal breath sounds. No stridor. No wheezing, rhonchi or rales.      Comments: Breathing comfortably on room air with no use of respiratory accessory muscles noted on inspection.  Adequate breath sounds, with no signs of consolidation appreciated on auscultation in the diffuse bilateral lung fields.  Abdominal:      General: There is no distension.      Palpations: Abdomen is soft.      Tenderness: There is no abdominal tenderness. There is no guarding or rebound.   Musculoskeletal:         General: Normal range of motion.      Cervical back: Normal range of motion and neck supple.      Right lower leg: No edema.      Left lower leg: No edema.   Skin:     General: Skin is warm and dry.      Coloration: Skin is not jaundiced or pale.   Neurological:      Mental Status: She is alert. Mental status is at baseline.      Cranial Nerves: No cranial nerve deficit.      Gait: Gait normal.   Psychiatric:         Mood and Affect: Mood normal.         Behavior: Behavior is cooperative.           Assessment:       1. Bacterial URI    2. Moderate episode of recurrent major depressive disorder    3. PCOS (polycystic ovarian syndrome)    4. Anxiety           Plan:       Bacterial URI  Start Doxycycline 100mg b.i.d. x7 days.  Continue OTC Mucinex-DM b.i.d. x7 days.  Start p.r.n. Benzonate Perles t.i.d. x 10 days for cough suppression.  Offered p.r.n Promethazine-DM cough syrup today, but patient declined.  If symptoms worsen, present to the ER or contact the office.  -     doxycycline (VIBRAMYCIN) 100 MG Cap; Take 1 capsule (100 mg total) by mouth 2 (two) times daily. for 7 days  Dispense: 14 capsule; Refill: 0  -     benzonatate (TESSALON) 200 MG capsule; Take 1 capsule (200 mg total) by mouth 3 (three) times daily as needed for Cough.  Dispense: 90 capsule; Refill: 0    Moderate episode of recurrent major depressive disorder    PCOS (polycystic ovarian  syndrome)    Anxiety      Follow up if symptoms worsen or fail to improve, for Bacterial URI.        8/26/2022 John Rasheed PA-C

## 2022-08-31 ENCOUNTER — PATIENT MESSAGE (OUTPATIENT)
Dept: FAMILY MEDICINE | Facility: CLINIC | Age: 32
End: 2022-08-31

## 2022-08-31 DIAGNOSIS — L50.9 URTICARIA: Primary | ICD-10-CM

## 2022-08-31 RX ORDER — PREDNISONE 20 MG/1
20 TABLET ORAL DAILY
Qty: 5 TABLET | Refills: 0 | Status: SHIPPED | OUTPATIENT
Start: 2022-08-31 | End: 2022-09-05

## 2022-08-31 RX ORDER — HYDROXYZINE PAMOATE 25 MG/1
25 CAPSULE ORAL EVERY 8 HOURS PRN
Qty: 21 CAPSULE | Refills: 0 | Status: SHIPPED | OUTPATIENT
Start: 2022-08-31 | End: 2022-09-07

## 2022-08-31 NOTE — TELEPHONE ENCOUNTER
Discontinue the antibiotic. After completing 5 days worth, this is a sufficient duration to treat her sinusitis.  I will send in a Rx of Vistaril to help with the itching, as well as a short course of Prednisone.  If symptoms worsen, present to urgent care or ER.  These prescriptions were sent to her pharmacy listed.

## 2022-09-06 ENCOUNTER — PATIENT MESSAGE (OUTPATIENT)
Dept: FAMILY MEDICINE | Facility: CLINIC | Age: 32
End: 2022-09-06

## 2022-09-07 RX ORDER — METOPROLOL SUCCINATE 25 MG/1
12.5 TABLET, EXTENDED RELEASE ORAL DAILY
Qty: 45 TABLET | Refills: 5 | Status: SHIPPED | OUTPATIENT
Start: 2022-09-07 | End: 2023-04-25 | Stop reason: SDUPTHER

## 2022-12-21 ENCOUNTER — TELEPHONE (OUTPATIENT)
Dept: FAMILY MEDICINE | Facility: CLINIC | Age: 32
End: 2022-12-21

## 2022-12-21 ENCOUNTER — PATIENT MESSAGE (OUTPATIENT)
Dept: FAMILY MEDICINE | Facility: CLINIC | Age: 32
End: 2022-12-21

## 2023-01-04 ENCOUNTER — PATIENT MESSAGE (OUTPATIENT)
Dept: FAMILY MEDICINE | Facility: CLINIC | Age: 33
End: 2023-01-04

## 2023-01-05 ENCOUNTER — PATIENT MESSAGE (OUTPATIENT)
Dept: FAMILY MEDICINE | Facility: CLINIC | Age: 33
End: 2023-01-05

## 2023-01-05 ENCOUNTER — TELEPHONE (OUTPATIENT)
Dept: FAMILY MEDICINE | Facility: CLINIC | Age: 33
End: 2023-01-05

## 2023-01-05 NOTE — TELEPHONE ENCOUNTER
Spoke with patient who states she noticed yesterday that she had a swollen lymph node. She took ibuprofen and was sore around the area. She thinks the medication helped some but states that the soreness started hurting up around her cheek and eye. Her eye is draining clear. States she is also getting a pain going down her neck that she is unsure if this is related. States everything is only on the right side. She doesn't have any sinus infection symptoms. She is going to the urgent care to be evaluated since we do not have any available appointments today. [facility]

## 2023-01-05 NOTE — TELEPHONE ENCOUNTER
----- Message from Kirti Ortiz MA sent at 1/5/2023  8:53 AM CST -----  Regarding: Appt request  Pt calling to be seen because her face is hard and swollen, neck swollen as well. [phone]

## 2023-01-07 LAB
ALBUMIN SERPL-MCNC: 4.1 G/DL (ref 3.6–5.1)
ALBUMIN/GLOB SERPL: 1.5 (CALC) (ref 1–2.5)
ALP SERPL-CCNC: 61 U/L (ref 31–125)
ALT SERPL-CCNC: 85 U/L (ref 6–29)
AST SERPL-CCNC: 61 U/L (ref 10–30)
BASOPHILS # BLD AUTO: 29 CELLS/UL (ref 0–200)
BASOPHILS NFR BLD AUTO: 0.5 %
BILIRUB SERPL-MCNC: 0.5 MG/DL (ref 0.2–1.2)
BUN SERPL-MCNC: 11 MG/DL (ref 7–25)
BUN/CREAT SERPL: ABNORMAL (CALC) (ref 6–22)
CALCIUM SERPL-MCNC: 9.4 MG/DL (ref 8.6–10.2)
CHLORIDE SERPL-SCNC: 106 MMOL/L (ref 98–110)
CHOLEST SERPL-MCNC: 210 MG/DL
CHOLEST/HDLC SERPL: 4.6 (CALC)
CO2 SERPL-SCNC: 28 MMOL/L (ref 20–32)
CREAT SERPL-MCNC: 0.54 MG/DL (ref 0.5–0.97)
EGFR: 126 ML/MIN/1.73M2
EOSINOPHIL # BLD AUTO: 68 CELLS/UL (ref 15–500)
EOSINOPHIL NFR BLD AUTO: 1.2 %
ERYTHROCYTE [DISTWIDTH] IN BLOOD BY AUTOMATED COUNT: 12.8 % (ref 11–15)
GLOBULIN SER CALC-MCNC: 2.8 G/DL (CALC) (ref 1.9–3.7)
GLUCOSE SERPL-MCNC: 84 MG/DL (ref 65–99)
HCT VFR BLD AUTO: 43.1 % (ref 35–45)
HDLC SERPL-MCNC: 46 MG/DL
HGB BLD-MCNC: 14.4 G/DL (ref 11.7–15.5)
LDLC SERPL CALC-MCNC: 133 MG/DL (CALC)
LYMPHOCYTES # BLD AUTO: 2348 CELLS/UL (ref 850–3900)
LYMPHOCYTES NFR BLD AUTO: 41.2 %
MCH RBC QN AUTO: 28.5 PG (ref 27–33)
MCHC RBC AUTO-ENTMCNC: 33.4 G/DL (ref 32–36)
MCV RBC AUTO: 85.3 FL (ref 80–100)
MONOCYTES # BLD AUTO: 331 CELLS/UL (ref 200–950)
MONOCYTES NFR BLD AUTO: 5.8 %
NEUTROPHILS # BLD AUTO: 2924 CELLS/UL (ref 1500–7800)
NEUTROPHILS NFR BLD AUTO: 51.3 %
NONHDLC SERPL-MCNC: 164 MG/DL (CALC)
PLATELET # BLD AUTO: 308 THOUSAND/UL (ref 140–400)
PMV BLD REES-ECKER: 11.6 FL (ref 7.5–12.5)
POTASSIUM SERPL-SCNC: 4.6 MMOL/L (ref 3.5–5.3)
PROT SERPL-MCNC: 6.9 G/DL (ref 6.1–8.1)
RBC # BLD AUTO: 5.05 MILLION/UL (ref 3.8–5.1)
SODIUM SERPL-SCNC: 142 MMOL/L (ref 135–146)
TRIGL SERPL-MCNC: 176 MG/DL
TSH SERPL-ACNC: 1.23 MIU/L
WBC # BLD AUTO: 5.7 THOUSAND/UL (ref 3.8–10.8)

## 2023-01-11 ENCOUNTER — PATIENT MESSAGE (OUTPATIENT)
Dept: FAMILY MEDICINE | Facility: CLINIC | Age: 33
End: 2023-01-11

## 2023-01-11 ENCOUNTER — OFFICE VISIT (OUTPATIENT)
Dept: FAMILY MEDICINE | Facility: CLINIC | Age: 33
End: 2023-01-11
Payer: COMMERCIAL

## 2023-01-11 ENCOUNTER — TELEPHONE (OUTPATIENT)
Dept: FAMILY MEDICINE | Facility: CLINIC | Age: 33
End: 2023-01-11

## 2023-01-11 VITALS
WEIGHT: 227.63 LBS | SYSTOLIC BLOOD PRESSURE: 118 MMHG | DIASTOLIC BLOOD PRESSURE: 70 MMHG | BODY MASS INDEX: 36.58 KG/M2 | HEIGHT: 66 IN | OXYGEN SATURATION: 98 % | HEART RATE: 96 BPM

## 2023-01-11 DIAGNOSIS — Z79.899 ENCOUNTER FOR LONG-TERM (CURRENT) USE OF OTHER MEDICATIONS: ICD-10-CM

## 2023-01-11 DIAGNOSIS — E28.2 PCOS (POLYCYSTIC OVARIAN SYNDROME): ICD-10-CM

## 2023-01-11 DIAGNOSIS — Z51.81 ENCOUNTER FOR THERAPEUTIC DRUG MONITORING: ICD-10-CM

## 2023-01-11 DIAGNOSIS — E55.9 VITAMIN D DEFICIENCY: ICD-10-CM

## 2023-01-11 DIAGNOSIS — R53.83 FATIGUE, UNSPECIFIED TYPE: ICD-10-CM

## 2023-01-11 DIAGNOSIS — J31.0 CHRONIC RHINITIS: ICD-10-CM

## 2023-01-11 DIAGNOSIS — F41.9 ANXIETY: Primary | ICD-10-CM

## 2023-01-11 PROCEDURE — 3074F PR MOST RECENT SYSTOLIC BLOOD PRESSURE < 130 MM HG: ICD-10-PCS | Mod: CPTII,S$GLB,, | Performed by: NURSE PRACTITIONER

## 2023-01-11 PROCEDURE — 3078F PR MOST RECENT DIASTOLIC BLOOD PRESSURE < 80 MM HG: ICD-10-PCS | Mod: CPTII,S$GLB,, | Performed by: NURSE PRACTITIONER

## 2023-01-11 PROCEDURE — 99214 OFFICE O/P EST MOD 30 MIN: CPT | Mod: S$GLB,,, | Performed by: NURSE PRACTITIONER

## 2023-01-11 PROCEDURE — 99214 PR OFFICE/OUTPT VISIT, EST, LEVL IV, 30-39 MIN: ICD-10-PCS | Mod: S$GLB,,, | Performed by: NURSE PRACTITIONER

## 2023-01-11 PROCEDURE — 3078F DIAST BP <80 MM HG: CPT | Mod: CPTII,S$GLB,, | Performed by: NURSE PRACTITIONER

## 2023-01-11 PROCEDURE — 3074F SYST BP LT 130 MM HG: CPT | Mod: CPTII,S$GLB,, | Performed by: NURSE PRACTITIONER

## 2023-01-11 PROCEDURE — 3008F PR BODY MASS INDEX (BMI) DOCUMENTED: ICD-10-PCS | Mod: CPTII,S$GLB,, | Performed by: NURSE PRACTITIONER

## 2023-01-11 PROCEDURE — 3008F BODY MASS INDEX DOCD: CPT | Mod: CPTII,S$GLB,, | Performed by: NURSE PRACTITIONER

## 2023-01-11 NOTE — TELEPHONE ENCOUNTER
----- Message from Liz Ramachandran sent at 1/11/2023 12:14 PM CST -----  This patient saw [facility] today. She needs an appointment in 2 months. She can be seen on  Monday or Wednesday mid morning. Pt's # [phone] GH

## 2023-01-11 NOTE — PROGRESS NOTES
SUBJECTIVE:    Patient ID: Oneida Ingram is a 32 y.o. female.    Chief Complaint: Follow-up, Sleeping Problem (Getting worse pt feel like she is not getting enough sleep), and Facial Swelling (X 1 week and on antibiotics )    32-year-old female presents for check up. She has a PMHx of Anxiety, MDD, and PCOS . She recently had labs. Would like to discuss results. She is complaining of chronic fatigue. She reports not sleeping great. Does not feel rested in am. Concerned about cortisol level. Moods are ok. Occasionally stressed. 3 teenagers.   She denies a smoking history.   She routinely walks 30 minutes, 2x/week,   H      Office Visit on 01/11/2023   Component Date Value Ref Range Status    Cortisol 01/11/2023 9.2  mcg/dL Final    Vitamin D, 25-OH, Total 01/11/2023 42  30 - 100 ng/mL Final   Telephone on 07/18/2022   Component Date Value Ref Range Status    WBC 07/19/2022 6.7  3.8 - 10.8 Thousand/uL Final    RBC 07/19/2022 4.73  3.80 - 5.10 Million/uL Final    Hemoglobin 07/19/2022 14.0  11.7 - 15.5 g/dL Final    Hematocrit 07/19/2022 41.1  35.0 - 45.0 % Final    MCV 07/19/2022 86.9  80.0 - 100.0 fL Final    MCH 07/19/2022 29.6  27.0 - 33.0 pg Final    MCHC 07/19/2022 34.1  32.0 - 36.0 g/dL Final    RDW 07/19/2022 12.6  11.0 - 15.0 % Final    Platelets 07/19/2022 316  140 - 400 Thousand/uL Final    MPV 07/19/2022 11.2  7.5 - 12.5 fL Final    Neutrophils, Abs 07/19/2022 3,276  1,500 - 7,800 cells/uL Final    Lymph # 07/19/2022 2,781  850 - 3,900 cells/uL Final    Mono # 07/19/2022 496  200 - 950 cells/uL Final    Eos # 07/19/2022 127  15 - 500 cells/uL Final    Baso # 07/19/2022 20  0 - 200 cells/uL Final    Neutrophils Relative 07/19/2022 48.9  % Final    Lymph % 07/19/2022 41.5  % Final    Mono % 07/19/2022 7.4  % Final    Eosinophil % 07/19/2022 1.9  % Final    Basophil % 07/19/2022 0.3  % Final    Glucose 07/19/2022 93  65 - 99 mg/dL Final    BUN 07/19/2022 11  7 - 25 mg/dL Final    Creatinine 07/19/2022 0.58   0.50 - 0.97 mg/dL Final    eGFR 07/19/2022 124  > OR = 60 mL/min/1.73m2 Final    BUN/Creatinine Ratio 07/19/2022 NOT APPLICABLE  6 - 22 (calc) Final    Sodium 07/19/2022 139  135 - 146 mmol/L Final    Potassium 07/19/2022 3.8  3.5 - 5.3 mmol/L Final    Chloride 07/19/2022 103  98 - 110 mmol/L Final    CO2 07/19/2022 27  20 - 32 mmol/L Final    Calcium 07/19/2022 9.6  8.6 - 10.2 mg/dL Final   Admission on 07/17/2022, Discharged on 07/17/2022   Component Date Value Ref Range Status    Preg Test, Ur 07/17/2022 Negative   Final    WBC 07/17/2022 11.99  3.90 - 12.70 K/uL Final    RBC 07/17/2022 4.77  4.00 - 5.40 M/uL Final    Hemoglobin 07/17/2022 14.4  12.0 - 16.0 g/dL Final    Hematocrit 07/17/2022 40.2  37.0 - 48.5 % Final    MCV 07/17/2022 84  82 - 98 fL Final    MCH 07/17/2022 30.2  27.0 - 31.0 pg Final    MCHC 07/17/2022 35.8  32.0 - 36.0 g/dL Final    RDW 07/17/2022 12.6  11.5 - 14.5 % Final    Platelets 07/17/2022 296  150 - 450 K/uL Final    MPV 07/17/2022 11.2  9.2 - 12.9 fL Final    Immature Granulocytes 07/17/2022 0.3  0.0 - 0.5 % Final    Gran # (ANC) 07/17/2022 10.4 (H)  1.8 - 7.7 K/uL Final    Immature Grans (Abs) 07/17/2022 0.03  0.00 - 0.04 K/uL Final    Lymph # 07/17/2022 0.9 (L)  1.0 - 4.8 K/uL Final    Mono # 07/17/2022 0.6  0.3 - 1.0 K/uL Final    Eos # 07/17/2022 0.0  0.0 - 0.5 K/uL Final    Baso # 07/17/2022 0.03  0.00 - 0.20 K/uL Final    nRBC 07/17/2022 0  0 /100 WBC Final    Gran % 07/17/2022 86.7 (H)  38.0 - 73.0 % Final    Lymph % 07/17/2022 7.7 (L)  18.0 - 48.0 % Final    Mono % 07/17/2022 4.7  4.0 - 15.0 % Final    Eosinophil % 07/17/2022 0.3  0.0 - 8.0 % Final    Basophil % 07/17/2022 0.3  0.0 - 1.9 % Final    Differential Method 07/17/2022 Automated   Final    Sodium 07/17/2022 136  136 - 145 mmol/L Final    Potassium 07/17/2022 4.0  3.5 - 5.1 mmol/L Final    Chloride 07/17/2022 103  95 - 110 mmol/L Final    CO2 07/17/2022 18 (L)  23 - 29 mmol/L Final    Glucose 07/17/2022 135 (H)  70 -  110 mg/dL Final    BUN 07/17/2022 13  6 - 20 mg/dL Final    Creatinine 07/17/2022 0.7  0.5 - 1.4 mg/dL Final    Calcium 07/17/2022 9.4  8.7 - 10.5 mg/dL Final    Anion Gap 07/17/2022 15  8 - 16 mmol/L Final    eGFR if African American 07/17/2022 >60  >60 mL/min/1.73 m^2 Final    eGFR if non African American 07/17/2022 >60  >60 mL/min/1.73 m^2 Final   Office Visit on 07/13/2022   Component Date Value Ref Range Status    Glucose 01/06/2023 84  65 - 99 mg/dL Final    BUN 01/06/2023 11  7 - 25 mg/dL Final    Creatinine 01/06/2023 0.54  0.50 - 0.97 mg/dL Final    eGFR 01/06/2023 126  > OR = 60 mL/min/1.73m2 Final    BUN/Creatinine Ratio 01/06/2023 NOT APPLICABLE  6 - 22 (calc) Final    Sodium 01/06/2023 142  135 - 146 mmol/L Final    Potassium 01/06/2023 4.6  3.5 - 5.3 mmol/L Final    Chloride 01/06/2023 106  98 - 110 mmol/L Final    CO2 01/06/2023 28  20 - 32 mmol/L Final    Calcium 01/06/2023 9.4  8.6 - 10.2 mg/dL Final    Total Protein 01/06/2023 6.9  6.1 - 8.1 g/dL Final    Albumin 01/06/2023 4.1  3.6 - 5.1 g/dL Final    Globulin, Total 01/06/2023 2.8  1.9 - 3.7 g/dL (calc) Final    Albumin/Globulin Ratio 01/06/2023 1.5  1.0 - 2.5 (calc) Final    Total Bilirubin 01/06/2023 0.5  0.2 - 1.2 mg/dL Final    Alkaline Phosphatase 01/06/2023 61  31 - 125 U/L Final    AST 01/06/2023 61 (H)  10 - 30 U/L Final    ALT 01/06/2023 85 (H)  6 - 29 U/L Final    Cholesterol 01/06/2023 210 (H)  <200 mg/dL Final    HDL 01/06/2023 46 (L)  > OR = 50 mg/dL Final    Triglycerides 01/06/2023 176 (H)  <150 mg/dL Final    LDL Cholesterol 01/06/2023 133 (H)  mg/dL (calc) Final    HDL/Cholesterol Ratio 01/06/2023 4.6  <5.0 (calc) Final    Non HDL Chol. (LDL+VLDL) 01/06/2023 164 (H)  <130 mg/dL (calc) Final    WBC 01/06/2023 5.7  3.8 - 10.8 Thousand/uL Final    RBC 01/06/2023 5.05  3.80 - 5.10 Million/uL Final    Hemoglobin 01/06/2023 14.4  11.7 - 15.5 g/dL Final    Hematocrit 01/06/2023 43.1  35.0 - 45.0 % Final    MCV 01/06/2023 85.3  80.0 -  100.0 fL Final    MCH 01/06/2023 28.5  27.0 - 33.0 pg Final    MCHC 01/06/2023 33.4  32.0 - 36.0 g/dL Final    RDW 01/06/2023 12.8  11.0 - 15.0 % Final    Platelets 01/06/2023 308  140 - 400 Thousand/uL Final    MPV 01/06/2023 11.6  7.5 - 12.5 fL Final    Neutrophils, Abs 01/06/2023 2,924  1,500 - 7,800 cells/uL Final    Lymph # 01/06/2023 2,348  850 - 3,900 cells/uL Final    Mono # 01/06/2023 331  200 - 950 cells/uL Final    Eos # 01/06/2023 68  15 - 500 cells/uL Final    Baso # 01/06/2023 29  0 - 200 cells/uL Final    Neutrophils Relative 01/06/2023 51.3  % Final    Lymph % 01/06/2023 41.2  % Final    Mono % 01/06/2023 5.8  % Final    Eosinophil % 01/06/2023 1.2  % Final    Basophil % 01/06/2023 0.5  % Final    TSH w/reflex to FT4 01/06/2023 1.23  mIU/L Final       Past Medical History:   Diagnosis Date    Anxiety     MRSA pneumonia     Pneumonia      Social History     Socioeconomic History    Marital status:    Tobacco Use    Smoking status: Never    Smokeless tobacco: Never   Substance and Sexual Activity    Alcohol use: Yes     Comment: socially    Drug use: No    Sexual activity: Yes     Partners: Male     Birth control/protection: None   Social History Narrative    Plans from [facility]         GYN Visit & History 10 [facility]1/7/2019     Obesity    PCOS        Visit Summary:    Down 5 pounds    UDS/ reviewed    Reviewed labs with patient    Discussed diet and exercise        Prescriptions:    SIG: metformin 500 mg oral tablet, 30 days, Dispense #120 Tablet, 5 Refills    Directions: take once daily for 2 weeks then increase to BID for 2 weeks, then increase to 2 po BID    SIG: phentermine 37.5 mg oral tablet, 30 days, Dispense #30 Tablet, 0 Refills    Directions: Take 1 oral tablet once a day        Plan:    Return for follow up appointment in 1 month        Nancy Saravia NP     GYN Visit & History 10 [facility]0/2/2019     Obesity    Irregular menses    hx of ovarian cysts        Visit Summary:    PCOS  panel 1 week prior to return appointment    UDS/ reviewed    Discussed diet and exercise at length        Prescriptions:    SIG: phentermine 37.5 mg oral tablet, 30 days, Dispense #30 Tablet, 0 Refills    Directions: Take 1 oral tablet once a day    SIG: Prenatal 28 mg iron- 800 mcg oral tablet, 30 days, Dispense #30 Tablet, 0 Refills    Directions: Take 1 oral tablet once a day        Plan:    Return for follow up appointment in1 month    Weight loss goal set for 8-10 pounds        Nancy Saravia NP     GYN Exam (Annual/6Wk PP)10 [facility]2016     Annual    Depression/Anxiety    Obesity    Break-through bleeding with OCP        Visit Summary    Ordered:    Blood Drawing    Pap IG, Ct-Ng, rfx HPV ASCU    CBC With Differential/Platelet    Glucose, Serum    Lipid Panel    UA w/o Micro: within normal limits    UPT: negative        Prescriptions:    SIG: NuvaRing 0.12-0.015 mg/24 hr ring,  days, Dispense #1 Ring, 3 Refills    Directions: one ring vaginally x 21 days, then remove and discard. Leave out for 7 days, then insert new ring.    Zoloft working for depression management.    Pt has a PCP for depression management, discussed counseling, exercise etc.        Return for follow up appointment in one year or prn.     GYN Visit & [id number] [facility]2016     [facility] k pneumonia. w same sensitivies as k pneumonia uti in .........gb us nl...renal us nl....co persisatnt pp...and frequency and urgency and nocgturia.....modeerat...x 1 y        a-relapsing k npuemonia uti,,..diarrhea x 1 mo            p-cipro 500 mg bid x 10 d...rtc 3 w for repaeat ucs and if still [prese nt will refer to dr mccallum......stool for ova and parasite and c deficil     GYN Visit & Adynady67 [facility]2016     27 yo........daily cramping in pelvis ...moderate....intermittendt and daily....duration 1 h....not worse w activity...u/s nl...on ocp.....associated w dysmen...requires advil and improves w nsaia....no dysp...no  dysuria....frequency and urgency..x 1 yr, not worsening, but w apin associated with right cvat....h/o kleb uti ......sedc rate 14 on ....bm 2-4x/d, x 2 mos....sp cs...appy....ho mrsa pneumonia w intubation and blood transfusion 2 yrs....        a-pelvic p[ain, dysmenorrhea, urinary frequency and urgency....poss renal etiology...r/o gb and renal dis        p-renal and gb us...ucs.....rtc 1 wk.....consider dx lap     GYN Visit & History2016     Pelvic pain: intermittent    Contraceptive counseling    urinary tract infection        Plan: Finish Keflex.    Pt did not take doxy as prescribed.    Recommended to take the full 10 days.    Reviewed u/s and labs with patient.    Track pain, continue OCP.    Return for follow up appointment in 2 months for follow up with Dr Horan or sooner if needed.    Terazol esent if needed for yeast r/t prolonged antibiotic use.     GYN Visit & History2016     25 yo........pp x 2mos.....ocp 2-3 wk....cramping w sharp exacerbagtion...right great than left...no n/v...post coital pelvic pain....fever x 1 d...pos appy 10 yr ago        a-pelvic pain....vag dc....        p-gc chl affirm ucs cbc sed rate...doxy 100 bid x 10...us....rtc 1 w     GYN Exam (Annual/6Wk PP)     Annual    Obesity        Plan: Pap with gc/ct    Adipex refilled    Zyrtec refilled.    Diet and exercise discussed.    Return for follow up appointment one year or prn.     GYN Visit & F/     Endometritis, resolved.  Obesity.  Family history of breast cancer.    Bilateral breast ultrasound at [facility].    Prescription for Contrave.    Return to clinic in one month.     GYN Exam (Annual/6Wk PP)     Annual exam.  Contraceptive management.  Strong family history of breast cancer.  Right breast cyst.  Endometritis.    CBC and sed.  Rate today.    Change birth control to Mircette 1 by mouth daily.    Doxycycline and Flagyl x2 weeks.    Ultrasound with radiology of  bilateral breasts.    BRCA gene testing.     GYN Visit & F/     Irregular menstrual cycle        Plan: Beta today    If starts menses, restart OCP.    Condom use.    Return for follow up appointment prn pending results.     GYN Visit & F/     Yeast vulvovaginitis    Recent MRSA pneumonia, S/P 3 week ICU stay        Plan: Diflucan 150mg daily #1    Mycolog ointment esent    Continue PT and follow up with PCP as scheduled    Return for follow up appointment prn.     GYN Visit & F/     Bacterial Vaginosis        Plan: Flagyl 500mg I PO BID, pt states she does have rx at home    Repeat diflucan.    Pt has no insurance at this time.        Return for follow up appointment prn.     GYN Visit & [id number]     co uri...on tricycle....        o- heent adenopathy.....cx clean        a-sp luz maria 1 ..uri        p-pap...amp 500 tid.....rtc 6 mo p/p....     GYN Visit & F/     cx bx hpv w/o dysplasia.....no tob...rtc 6 mos pap...Gardasil at hd     DAY GYN Visit + History2013     colpo-prob luz maria 2.....bx at 600/900.........rtc 1 wk     Past Surgical History:   Procedure Laterality Date    APPENDECTOMY      BRONCHOSCOPY       SECTION, LOW TRANSVERSE      DILATION AND CURETTAGE OF UTERUS N/A 2020    Procedure: DILATION AND CURETTAGE, UTERUS;  Surgeon: Srinivasan Stewart MD;  Location: [facility] OR;  Service: OB/GYN;  Laterality: N/A;    HYSTEROSCOPIC POLYPECTOMY OF UTERUS N/A 2020    Procedure: POLYPECTOMY, UTERUS, HYSTEROSCOPIC;  Surgeon: Srinivasan Stewart MD;  Location: [facility] OR;  Service: OB/GYN;  Laterality: N/A;    HYSTEROSCOPY N/A 2020    Procedure: HYSTEROSCOPY;  Surgeon: Srinivasan Stewart MD;  Location: [facility] OR;  Service: OB/GYN;  Laterality: N/A;    TONSILLECTOMY       Family History   Problem Relation Age of Onset    COPD Mother     Asthma Mother     Heart disease Mother     Heart disease Brother     Heart disease Maternal Grandfather     Diabetes Paternal  Grandmother     Heart disease Paternal Grandmother     Breast cancer Paternal Aunt     Breast cancer Paternal Aunt     Breast cancer Paternal Cousin     Immunodeficiency Neg Hx     Eczema Neg Hx     Rhinitis Neg Hx        Review of patient's allergies indicates:   Allergen Reactions    Azithromycin Hives    Bactrim [sulfamethoxazole-trimethoprim]      Rash     Ciprofloxacin      Doesn't remember ? Rash     Naltrexone-bupropion Nausea And Vomiting and Other (See Comments)     hands and face numbness    Penicillins Rash     Tiny raised bumps. Not urticarial- 10 years.        Current Outpatient Medications:     albuterol (PROAIR HFA) 90 mcg/actuation inhaler, Inhale 2 puffs into the lungs every 4 to 6 hours as needed., Disp: 18 g, Rfl: 0    ascorbic acid, vitamin C, (VITAMIN C) 1000 MG tablet, Take 1,000 mg by mouth., Disp: , Rfl:     aspirin (ECOTRIN) 81 MG EC tablet, Take 81 mg by mouth once daily., Disp: , Rfl:     cinnamon bark 500 mg capsule, Take 500 mg by mouth once daily., Disp: , Rfl:     clonazePAM (KLONOPIN) 0.5 MG tablet, Take 1 tablet (0.5 mg total) by mouth 2 (two) times daily as needed for Anxiety., Disp: 60 tablet, Rfl: 5    EScitalopram oxalate (LEXAPRO) 10 MG tablet, Take 10 mg by mouth once daily., Disp: , Rfl:     fluticasone propionate (FLONASE) 50 mcg/actuation nasal spray, 1 spray (50 mcg total) by Each Nostril route once daily., Disp: 18.2 mL, Rfl: 2    levocetirizine (XYZAL) 5 MG tablet, Take 1 tablet (5 mg total) by mouth once daily., Disp: 30 tablet, Rfl: 5    methylPREDNISolone (MEDROL DOSEPACK) 4 mg tablet, use as directed, Disp: 21 each, Rfl: 0    metoprolol succinate (TOPROL-XL) 25 MG 24 hr tablet, Take 0.5 tablets (12.5 mg total) by mouth once daily., Disp: 45 tablet, Rfl: 5    norgestimate-ethinyl estradioL (ORTHO TRI-CYCLEN LO) 0.18/0.215/0.25 mg-25 mcg tablet, Take 1 tablet by mouth once daily., Disp: , Rfl:     UNABLE TO FIND, Take 1 tablet by mouth once daily. Methyl-balance, Disp:  ", Rfl:     UNABLE TO FIND, Take 4 capsules by mouth once daily. emery & Dchiro inositol, Disp: , Rfl:     UNABLE TO FIND, Take 2 capsules by mouth once daily. Calm  Sleep capsules, Disp: , Rfl:     vitamin D (VITAMIN D3) 1000 units Tab, Take 1,000 Units by mouth once daily., Disp: , Rfl:     vitamin E 400 UNIT capsule, Take 400 Units by mouth once daily., Disp: , Rfl:     zinc gluconate 50 mg tablet, Take 50 mg by mouth., Disp: , Rfl:     Review of Systems   Constitutional:  Positive for fatigue. Negative for chills, fever and unexpected weight change.   HENT:  Negative for ear pain, rhinorrhea and sore throat.    Eyes:  Negative for pain and visual disturbance.   Respiratory:  Negative for cough and shortness of breath.    Cardiovascular:  Negative for chest pain, palpitations and leg swelling.   Gastrointestinal:  Negative for abdominal pain, diarrhea, nausea and vomiting.   Genitourinary:  Negative for difficulty urinating, hematuria and vaginal bleeding.   Musculoskeletal:  Negative for arthralgias.   Skin:  Negative for rash.   Neurological:  Negative for dizziness, weakness and headaches.   Psychiatric/Behavioral:  Negative for agitation and sleep disturbance. The patient is not nervous/anxious.         Objective:      Vitals:    01/11/23 1111   BP: 118/70   Pulse: 96   SpO2: 98%   Weight: 103.2 kg (227 lb 9.6 oz)   Height: 5' 6" (1.676 m)     Physical Exam  Vitals and nursing note reviewed.   Constitutional:       General: She is not in acute distress.     Appearance: Normal appearance. She is well-developed. She is obese.   HENT:      Head: Normocephalic.      Right Ear: External ear normal.      Left Ear: External ear normal.   Eyes:      Conjunctiva/sclera: Conjunctivae normal.      Pupils: Pupils are equal, round, and reactive to light.   Neck:      Vascular: No JVD.   Cardiovascular:      Rate and Rhythm: Normal rate and regular rhythm.      Heart sounds: No murmur heard.  Pulmonary:      Effort: Pulmonary " effort is normal.      Breath sounds: Normal breath sounds.   Abdominal:      General: Bowel sounds are normal.      Palpations: Abdomen is soft.   Musculoskeletal:         General: No deformity. Normal range of motion.      Cervical back: Normal range of motion and neck supple.   Lymphadenopathy:      Cervical: No cervical adenopathy.   Skin:     General: Skin is warm and dry.      Findings: No rash.   Neurological:      Mental Status: She is alert and oriented to person, place, and time.      Gait: Gait normal.   Psychiatric:         Speech: Speech normal.         Behavior: Behavior normal.         Assessment:       1. Anxiety    2. Encounter for long-term (current) use of other medications    3. Encounter for therapeutic drug monitoring    4. Vitamin D deficiency    5. PCOS (polycystic ovarian syndrome)    6. Fatigue, unspecified type    7. Chronic rhinitis           Plan:       Anxiety  -     Cortisol; Future; Expected date: 01/11/2023    Encounter for long-term (current) use of other medications    Encounter for therapeutic drug monitoring    Vitamin D deficiency  -     Vitamin D; Future; Expected date: 01/11/2023    PCOS (polycystic ovarian syndrome)  -     Cortisol; Future; Expected date: 01/11/2023    Fatigue, unspecified type  -     Cortisol; Future; Expected date: 01/11/2023    Chronic rhinitis      Follow up in about 2 months (around 3/11/2023), or if symptoms worsen or fail to improve, for medication management.        1/22/2023 Peace Gray

## 2023-01-12 LAB
25(OH)D3 SERPL-MCNC: 42 NG/ML (ref 30–100)
CORTIS SERPL-MCNC: 9.2 MCG/DL

## 2023-01-17 ENCOUNTER — PATIENT MESSAGE (OUTPATIENT)
Dept: FAMILY MEDICINE | Facility: CLINIC | Age: 33
End: 2023-01-17

## 2023-01-17 RX ORDER — LEVOCETIRIZINE DIHYDROCHLORIDE 5 MG/1
5 TABLET, FILM COATED ORAL DAILY
Qty: 30 TABLET | Refills: 5 | Status: SHIPPED | OUTPATIENT
Start: 2023-01-17 | End: 2023-04-25 | Stop reason: SDUPTHER

## 2023-01-19 ENCOUNTER — PATIENT MESSAGE (OUTPATIENT)
Dept: FAMILY MEDICINE | Facility: CLINIC | Age: 33
End: 2023-01-19

## 2023-01-20 ENCOUNTER — PATIENT MESSAGE (OUTPATIENT)
Dept: FAMILY MEDICINE | Facility: CLINIC | Age: 33
End: 2023-01-20

## 2023-01-20 RX ORDER — METHYLPREDNISOLONE 4 MG/1
TABLET ORAL
Qty: 21 EACH | Refills: 0 | Status: SHIPPED | OUTPATIENT
Start: 2023-01-20 | End: 2023-02-10

## 2023-01-20 NOTE — TELEPHONE ENCOUNTER
Okay for Medrol taper, but if symptoms persist she will need to be evaluated with Dermatology or allergy for further evaluation.  I can not really tell anything from the picture but if she was on clindamycin felt like she had an allergic drug eruption we can try the steroid taper.

## 2023-01-23 ENCOUNTER — TELEPHONE (OUTPATIENT)
Dept: FAMILY MEDICINE | Facility: CLINIC | Age: 33
End: 2023-01-23

## 2023-01-23 NOTE — TELEPHONE ENCOUNTER
----- Message from Peace Gray NP sent at 1/22/2023 11:36 PM CST -----  Labs are within normal limits

## 2023-03-08 ENCOUNTER — TELEPHONE (OUTPATIENT)
Dept: FAMILY MEDICINE | Facility: CLINIC | Age: 33
End: 2023-03-08

## 2023-03-08 DIAGNOSIS — Z00.00 PHYSICAL EXAM: Primary | ICD-10-CM

## 2023-03-08 DIAGNOSIS — E55.9 VITAMIN D DEFICIENCY: ICD-10-CM

## 2023-03-08 DIAGNOSIS — Z79.899 ENCOUNTER FOR LONG-TERM (CURRENT) USE OF OTHER MEDICATIONS: ICD-10-CM

## 2023-03-12 ENCOUNTER — PATIENT MESSAGE (OUTPATIENT)
Dept: FAMILY MEDICINE | Facility: CLINIC | Age: 33
End: 2023-03-12

## 2023-03-14 LAB
ALBUMIN SERPL-MCNC: 4.2 G/DL (ref 3.6–5.1)
ALBUMIN/GLOB SERPL: 1.4 (CALC) (ref 1–2.5)
ALP SERPL-CCNC: 53 U/L (ref 31–125)
ALT SERPL-CCNC: 43 U/L (ref 6–29)
APPEARANCE UR: CLEAR
AST SERPL-CCNC: 34 U/L (ref 10–30)
BACTERIA #/AREA URNS HPF: NORMAL /HPF
BACTERIA UR CULT: NORMAL
BASOPHILS # BLD AUTO: 37 CELLS/UL (ref 0–200)
BASOPHILS NFR BLD AUTO: 0.5 %
BILIRUB SERPL-MCNC: 0.6 MG/DL (ref 0.2–1.2)
BILIRUB UR QL STRIP: NEGATIVE
BUN SERPL-MCNC: 15 MG/DL (ref 7–25)
BUN/CREAT SERPL: ABNORMAL (CALC) (ref 6–22)
CALCIUM SERPL-MCNC: 9.5 MG/DL (ref 8.6–10.2)
CHLORIDE SERPL-SCNC: 106 MMOL/L (ref 98–110)
CHOLEST SERPL-MCNC: 185 MG/DL
CHOLEST/HDLC SERPL: 4.7 (CALC)
CO2 SERPL-SCNC: 23 MMOL/L (ref 20–32)
COLOR UR: NORMAL
CREAT SERPL-MCNC: 0.6 MG/DL (ref 0.5–0.97)
EGFR: 123 ML/MIN/1.73M2
EOSINOPHIL # BLD AUTO: 74 CELLS/UL (ref 15–500)
EOSINOPHIL NFR BLD AUTO: 1 %
ERYTHROCYTE [DISTWIDTH] IN BLOOD BY AUTOMATED COUNT: 12.6 % (ref 11–15)
GLOBULIN SER CALC-MCNC: 2.9 G/DL (CALC) (ref 1.9–3.7)
GLUCOSE SERPL-MCNC: 82 MG/DL (ref 65–99)
GLUCOSE UR QL STRIP: NEGATIVE
HCT VFR BLD AUTO: 46.3 % (ref 35–45)
HDLC SERPL-MCNC: 39 MG/DL
HGB BLD-MCNC: 15 G/DL (ref 11.7–15.5)
HGB UR QL STRIP: NEGATIVE
HYALINE CASTS #/AREA URNS LPF: NORMAL /LPF
KETONES UR QL STRIP: NEGATIVE
LDLC SERPL CALC-MCNC: 116 MG/DL (CALC)
LEUKOCYTE ESTERASE UR QL STRIP: NEGATIVE
LYMPHOCYTES # BLD AUTO: 2701 CELLS/UL (ref 850–3900)
LYMPHOCYTES NFR BLD AUTO: 36.5 %
MCH RBC QN AUTO: 28.8 PG (ref 27–33)
MCHC RBC AUTO-ENTMCNC: 32.4 G/DL (ref 32–36)
MCV RBC AUTO: 88.9 FL (ref 80–100)
MONOCYTES # BLD AUTO: 385 CELLS/UL (ref 200–950)
MONOCYTES NFR BLD AUTO: 5.2 %
NEUTROPHILS # BLD AUTO: 4203 CELLS/UL (ref 1500–7800)
NEUTROPHILS NFR BLD AUTO: 56.8 %
NITRITE UR QL STRIP: NEGATIVE
NONHDLC SERPL-MCNC: 146 MG/DL (CALC)
PH UR STRIP: 5.5 [PH] (ref 5–8)
PLATELET # BLD AUTO: 351 THOUSAND/UL (ref 140–400)
PMV BLD REES-ECKER: 11.6 FL (ref 7.5–12.5)
POTASSIUM SERPL-SCNC: 4.2 MMOL/L (ref 3.5–5.3)
PROT SERPL-MCNC: 7.1 G/DL (ref 6.1–8.1)
PROT UR QL STRIP: NEGATIVE
RBC # BLD AUTO: 5.21 MILLION/UL (ref 3.8–5.1)
RBC #/AREA URNS HPF: NORMAL /HPF
SERVICE CMNT-IMP: NORMAL
SODIUM SERPL-SCNC: 140 MMOL/L (ref 135–146)
SP GR UR STRIP: 1.03 (ref 1–1.03)
SQUAMOUS #/AREA URNS HPF: NORMAL /HPF
TRIGL SERPL-MCNC: 181 MG/DL
TSH SERPL-ACNC: 1.55 MIU/L
WBC # BLD AUTO: 7.4 THOUSAND/UL (ref 3.8–10.8)
WBC #/AREA URNS HPF: NORMAL /HPF

## 2023-03-15 ENCOUNTER — TELEPHONE (OUTPATIENT)
Dept: FAMILY MEDICINE | Facility: CLINIC | Age: 33
End: 2023-03-15

## 2023-03-15 NOTE — TELEPHONE ENCOUNTER
Pt rescheduled would like to be notified if something opens sooner. Pt also notified she can give us a call to check in to see if we have anything sooner.

## 2023-03-15 NOTE — TELEPHONE ENCOUNTER
----- Message from Jessica Mcelroy sent at 3/15/2023  3:17 PM CDT -----  Pt would like to get another appointment she had to cancel due to her child getting ill. [phone]

## 2023-03-19 NOTE — PROGRESS NOTES
Call patient.  Her CBC shows no anemia.  Liver enzymes are improving but still slightly elevated.  AST 34 ALT 43.  Kidneys and sugar look normal.  Cholesterol excellent at 185 triglycerides 181, thyroid functions normal.  Continue current medications

## 2023-03-20 ENCOUNTER — TELEPHONE (OUTPATIENT)
Dept: FAMILY MEDICINE | Facility: CLINIC | Age: 33
End: 2023-03-20

## 2023-03-20 NOTE — TELEPHONE ENCOUNTER
Spoke to patient with results. Said that she saw these on her patient portal with Dr Arias's notes and does not have any questions.

## 2023-03-20 NOTE — TELEPHONE ENCOUNTER
----- Message from Cliff Arias MD sent at 3/19/2023  2:09 PM CDT -----  Call patient.  Her CBC shows no anemia.  Liver enzymes are improving but still slightly elevated.  AST 34 ALT 43.  Kidneys and sugar look normal.  Cholesterol excellent at 185 triglycerides 181, thyroid functions normal.  Continue current medications

## 2023-04-11 ENCOUNTER — PATIENT MESSAGE (OUTPATIENT)
Dept: FAMILY MEDICINE | Facility: CLINIC | Age: 33
End: 2023-04-11

## 2023-04-25 ENCOUNTER — OFFICE VISIT (OUTPATIENT)
Dept: FAMILY MEDICINE | Facility: CLINIC | Age: 33
End: 2023-04-25
Payer: COMMERCIAL

## 2023-04-25 VITALS
BODY MASS INDEX: 36.96 KG/M2 | WEIGHT: 230 LBS | OXYGEN SATURATION: 99 % | SYSTOLIC BLOOD PRESSURE: 130 MMHG | DIASTOLIC BLOOD PRESSURE: 80 MMHG | HEART RATE: 78 BPM | HEIGHT: 66 IN

## 2023-04-25 DIAGNOSIS — J06.9 VIRAL URI: ICD-10-CM

## 2023-04-25 DIAGNOSIS — F41.9 ANXIETY: ICD-10-CM

## 2023-04-25 DIAGNOSIS — J31.0 CHRONIC RHINITIS: ICD-10-CM

## 2023-04-25 DIAGNOSIS — F33.42 RECURRENT MAJOR DEPRESSIVE DISORDER, IN FULL REMISSION: Primary | ICD-10-CM

## 2023-04-25 DIAGNOSIS — E28.2 PCOS (POLYCYSTIC OVARIAN SYNDROME): ICD-10-CM

## 2023-04-25 PROCEDURE — 1159F MED LIST DOCD IN RCRD: CPT | Mod: CPTII,S$GLB,, | Performed by: NURSE PRACTITIONER

## 2023-04-25 PROCEDURE — 1160F RVW MEDS BY RX/DR IN RCRD: CPT | Mod: CPTII,S$GLB,, | Performed by: NURSE PRACTITIONER

## 2023-04-25 PROCEDURE — 3075F SYST BP GE 130 - 139MM HG: CPT | Mod: CPTII,S$GLB,, | Performed by: NURSE PRACTITIONER

## 2023-04-25 PROCEDURE — 3008F PR BODY MASS INDEX (BMI) DOCUMENTED: ICD-10-PCS | Mod: CPTII,S$GLB,, | Performed by: NURSE PRACTITIONER

## 2023-04-25 PROCEDURE — 1160F PR REVIEW ALL MEDS BY PRESCRIBER/CLIN PHARMACIST DOCUMENTED: ICD-10-PCS | Mod: CPTII,S$GLB,, | Performed by: NURSE PRACTITIONER

## 2023-04-25 PROCEDURE — 1159F PR MEDICATION LIST DOCUMENTED IN MEDICAL RECORD: ICD-10-PCS | Mod: CPTII,S$GLB,, | Performed by: NURSE PRACTITIONER

## 2023-04-25 PROCEDURE — 3008F BODY MASS INDEX DOCD: CPT | Mod: CPTII,S$GLB,, | Performed by: NURSE PRACTITIONER

## 2023-04-25 PROCEDURE — 3075F PR MOST RECENT SYSTOLIC BLOOD PRESS GE 130-139MM HG: ICD-10-PCS | Mod: CPTII,S$GLB,, | Performed by: NURSE PRACTITIONER

## 2023-04-25 PROCEDURE — 3079F PR MOST RECENT DIASTOLIC BLOOD PRESSURE 80-89 MM HG: ICD-10-PCS | Mod: CPTII,S$GLB,, | Performed by: NURSE PRACTITIONER

## 2023-04-25 PROCEDURE — 99214 PR OFFICE/OUTPT VISIT, EST, LEVL IV, 30-39 MIN: ICD-10-PCS | Mod: S$GLB,,, | Performed by: NURSE PRACTITIONER

## 2023-04-25 PROCEDURE — 3079F DIAST BP 80-89 MM HG: CPT | Mod: CPTII,S$GLB,, | Performed by: NURSE PRACTITIONER

## 2023-04-25 PROCEDURE — 99214 OFFICE O/P EST MOD 30 MIN: CPT | Mod: S$GLB,,, | Performed by: NURSE PRACTITIONER

## 2023-04-25 RX ORDER — LEVOCETIRIZINE DIHYDROCHLORIDE 5 MG/1
5 TABLET, FILM COATED ORAL DAILY
Qty: 30 TABLET | Refills: 5 | Status: SHIPPED | OUTPATIENT
Start: 2023-04-25 | End: 2023-11-27 | Stop reason: SDUPTHER

## 2023-04-25 RX ORDER — BUTALBITAL, ACETAMINOPHEN AND CAFFEINE 50; 325; 40 MG/1; MG/1; MG/1
1 TABLET ORAL EVERY 4 HOURS PRN
Qty: 20 TABLET | Refills: 0 | Status: SHIPPED | OUTPATIENT
Start: 2023-04-25 | End: 2023-05-25

## 2023-04-25 RX ORDER — ESCITALOPRAM OXALATE 10 MG/1
10 TABLET ORAL DAILY
Qty: 90 TABLET | Refills: 3 | Status: SHIPPED | OUTPATIENT
Start: 2023-04-25 | End: 2024-02-21

## 2023-04-25 RX ORDER — FLUTICASONE PROPIONATE 50 MCG
2 SPRAY, SUSPENSION (ML) NASAL DAILY
Qty: 18.2 ML | Refills: 2 | Status: SHIPPED | OUTPATIENT
Start: 2023-04-25 | End: 2024-03-26 | Stop reason: SDUPTHER

## 2023-04-25 RX ORDER — METOPROLOL SUCCINATE 25 MG/1
12.5 TABLET, EXTENDED RELEASE ORAL DAILY
Qty: 45 TABLET | Refills: 5 | Status: SHIPPED | OUTPATIENT
Start: 2023-04-25 | End: 2024-04-24

## 2023-04-25 RX ORDER — DEXTROAMPHETAMINE SACCHARATE, AMPHETAMINE ASPARTATE MONOHYDRATE, DEXTROAMPHETAMINE SULFATE AND AMPHETAMINE SULFATE 3.75; 3.75; 3.75; 3.75 MG/1; MG/1; MG/1; MG/1
15 CAPSULE, EXTENDED RELEASE ORAL EVERY MORNING
Qty: 30 CAPSULE | Refills: 0 | Status: SHIPPED | OUTPATIENT
Start: 2023-04-25 | End: 2023-05-24 | Stop reason: SDUPTHER

## 2023-04-25 RX ORDER — CLONAZEPAM 0.5 MG/1
0.5 TABLET ORAL 2 TIMES DAILY PRN
Qty: 60 TABLET | Refills: 5 | Status: SHIPPED | OUTPATIENT
Start: 2023-04-25 | End: 2024-03-07 | Stop reason: SDUPTHER

## 2023-04-30 NOTE — PROGRESS NOTES
SUBJECTIVE:    Patient ID: Oneida Ingram is a 33 y.o. female.    Chief Complaint: ADHD (Bottles brought/ Pt would like to discuss getting on ADD meds/BG)    33-year-old female presents for check up. She has a PMHx of Anxiety, MDD, and PCOS .  Occasionally stressed. 3 teenagers. Trying to exercise. Would like to discuss add. Has a hard time focusing at work. Feels all over the place.   H      Telephone on 03/08/2023   Component Date Value Ref Range Status    WBC 03/13/2023 7.4  3.8 - 10.8 Thousand/uL Final    RBC 03/13/2023 5.21 (H)  3.80 - 5.10 Million/uL Final    Hemoglobin 03/13/2023 15.0  11.7 - 15.5 g/dL Final    Hematocrit 03/13/2023 46.3 (H)  35.0 - 45.0 % Final    MCV 03/13/2023 88.9  80.0 - 100.0 fL Final    MCH 03/13/2023 28.8  27.0 - 33.0 pg Final    MCHC 03/13/2023 32.4  32.0 - 36.0 g/dL Final    RDW 03/13/2023 12.6  11.0 - 15.0 % Final    Platelets 03/13/2023 351  140 - 400 Thousand/uL Final    MPV 03/13/2023 11.6  7.5 - 12.5 fL Final    Neutrophils, Abs 03/13/2023 4,203  1,500 - 7,800 cells/uL Final    Lymph # 03/13/2023 2,701  850 - 3,900 cells/uL Final    Mono # 03/13/2023 385  200 - 950 cells/uL Final    Eos # 03/13/2023 74  15 - 500 cells/uL Final    Baso # 03/13/2023 37  0 - 200 cells/uL Final    Neutrophils Relative 03/13/2023 56.8  % Final    Lymph % 03/13/2023 36.5  % Final    Mono % 03/13/2023 5.2  % Final    Eosinophil % 03/13/2023 1.0  % Final    Basophil % 03/13/2023 0.5  % Final    Glucose 03/13/2023 82  65 - 99 mg/dL Final    BUN 03/13/2023 15  7 - 25 mg/dL Final    Creatinine 03/13/2023 0.60  0.50 - 0.97 mg/dL Final    eGFR 03/13/2023 123  > OR = 60 mL/min/1.73m2 Final    BUN/Creatinine Ratio 03/13/2023 NOT APPLICABLE  6 - 22 (calc) Final    Sodium 03/13/2023 140  135 - 146 mmol/L Final    Potassium 03/13/2023 4.2  3.5 - 5.3 mmol/L Final    Chloride 03/13/2023 106  98 - 110 mmol/L Final    CO2 03/13/2023 23  20 - 32 mmol/L Final    Calcium 03/13/2023 9.5  8.6 - 10.2 mg/dL Final    Total  Protein 03/13/2023 7.1  6.1 - 8.1 g/dL Final    Albumin 03/13/2023 4.2  3.6 - 5.1 g/dL Final    Globulin, Total 03/13/2023 2.9  1.9 - 3.7 g/dL (calc) Final    Albumin/Globulin Ratio 03/13/2023 1.4  1.0 - 2.5 (calc) Final    Total Bilirubin 03/13/2023 0.6  0.2 - 1.2 mg/dL Final    Alkaline Phosphatase 03/13/2023 53  31 - 125 U/L Final    AST 03/13/2023 34 (H)  10 - 30 U/L Final    ALT 03/13/2023 43 (H)  6 - 29 U/L Final    Cholesterol 03/13/2023 185  <200 mg/dL Final    HDL 03/13/2023 39 (L)  > OR = 50 mg/dL Final    Triglycerides 03/13/2023 181 (H)  <150 mg/dL Final    LDL Cholesterol 03/13/2023 116 (H)  mg/dL (calc) Final    HDL/Cholesterol Ratio 03/13/2023 4.7  <5.0 (calc) Final    Non HDL Chol. (LDL+VLDL) 03/13/2023 146 (H)  <130 mg/dL (calc) Final    TSH w/reflex to FT4 03/13/2023 1.55  mIU/L Final    Color, UA 03/13/2023 DARK YELLOW  YELLOW Final    Appearance, UA 03/13/2023 CLEAR  CLEAR Final    Specific Gravity, UA 03/13/2023 1.027  1.001 - 1.035 Final    pH, UA 03/13/2023 5.5  5.0 - 8.0 Final    Glucose, UA 03/13/2023 NEGATIVE  NEGATIVE Final    Bilirubin, UA 03/13/2023 NEGATIVE  NEGATIVE Final    Ketones, UA 03/13/2023 NEGATIVE  NEGATIVE Final    Occult Blood UA 03/13/2023 NEGATIVE  NEGATIVE Final    Protein, UA 03/13/2023 NEGATIVE  NEGATIVE Final    Nitrite, UA 03/13/2023 NEGATIVE  NEGATIVE Final    Leukocytes, UA 03/13/2023 NEGATIVE  NEGATIVE Final    WBC Casts, UA 03/13/2023 NONE SEEN  < OR = 5 /HPF Final    RBC Casts, UA 03/13/2023 NONE SEEN  < OR = 2 /HPF Final    Squam Epithel, UA 03/13/2023 0-5  < OR = 5 /HPF Final    Bacteria, UA 03/13/2023 NONE SEEN  NONE SEEN /HPF Final    Hyaline Casts, UA 03/13/2023 NONE SEEN  NONE SEEN /LPF Final    Service Cmt: 03/13/2023    Final    Reflexive Urine Culture 03/13/2023    Final   Office Visit on 01/11/2023   Component Date Value Ref Range Status    Cortisol 01/11/2023 9.2  mcg/dL Final    Vitamin D, 25-OH, Total 01/11/2023 42  30 - 100 ng/mL Final       Past  Medical History:   Diagnosis Date    Anxiety     MRSA pneumonia     Pneumonia      Social History     Socioeconomic History    Marital status:    Tobacco Use    Smoking status: Never    Smokeless tobacco: Never   Substance and Sexual Activity    Alcohol use: Yes     Comment: socially    Drug use: No    Sexual activity: Yes     Partners: Male     Birth control/protection: None   Social History Narrative    Plans from [facility]         GYN Visit & History 10 [facility]/7[facility]2019     Obesity    PCOS        Visit Summary:    Down 5 pounds    UDS/ reviewed    Reviewed labs with patient    Discussed diet and exercise        Prescriptions:    SIG: metformin 500 mg oral tablet, 30 days, Dispense #120 Tablet, 5 Refills    Directions: take once daily for 2 weeks then increase to BID for 2 weeks, then increase to 2 po BID    SIG: phentermine 37.5 mg oral tablet, 30 days, Dispense #30 Tablet, 0 Refills    Directions: Take 1 oral tablet once a day        Plan:    Return for follow up appointment in 1 month        Nancy Saravia NP     GYN Visit & History 10 [facility]0/2/2019     Obesity    Irregular menses    hx of ovarian cysts        Visit Summary:    PCOS panel 1 week prior to return appointment    UDS/ reviewed    Discussed diet and exercise at length        Prescriptions:    SIG: phentermine 37.5 mg oral tablet, 30 days, Dispense #30 Tablet, 0 Refills    Directions: Take 1 oral tablet once a day    SIG: Prenatal 28 mg iron- 800 mcg oral tablet, 30 days, Dispense #30 Tablet, 0 Refills    Directions: Take 1 oral tablet once a day        Plan:    Return for follow up appointment in1 month    Weight loss goal set for 8-10 pounds        Nancy Saravia NP     GYN Exam (Annual/6Wk PP)10 [facility]2/13[facility]2016     Annual    Depression/Anxiety    Obesity    Break-through bleeding with OCP        Visit Summary    Ordered:    Blood Drawing    Pap IG, Ct-Ng, rfx HPV ASCU    CBC With Differential/Platelet    Glucose, Serum    Lipid Panel     UA w/o Micro: within normal limits    UPT: negative        Prescriptions:    SIG: NuvaRing 0.12-0.015 mg/24 hr ring,  days, Dispense #1 Ring, 3 Refills    Directions: one ring vaginally x 21 days, then remove and discard. Leave out for 7 days, then insert new ring.    Zoloft working for depression management.    Pt has a PCP for depression management, discussed counseling, exercise etc.        Return for follow up appointment in one year or prn.     GYN Visit & [id number] [facility]2016     [facility] k pneumonia. w same sensitivies as k pneumonia uti in .........gb us nl...renal us nl....co persisatnt pp...and frequency and urgency and nocgturia.....modeerat...x 1 y        a-relapsing k npuemonia uti,,..diarrhea x 1 mo            p-cipro 500 mg bid x 10 d...rtc 3 w for repaeat ucs and if still [prese nt will refer to dr mccallum......stool for ova and parasite and c deficil     GYN Visit & Ogilzan74 [facility]2016     25 yo........daily cramping in pelvis ...moderate....intermittendt and daily....duration 1 h....not worse w activity...u/s nl...on ocp.....associated w dysmen...requires advil and improves w nsaia....no dysp...no dysuria....frequency and urgency..x 1 yr, not worsening, but w apin associated with right cvat....h/o kleb uti ......sedc rate 14 on ....bm 2-4x/d, x 2 mos....sp cs...appy....ho mrsa pneumonia w intubation and blood transfusion 2 yrs....        a-pelvic p[ain, dysmenorrhea, urinary frequency and urgency....poss renal etiology...r/o gb and renal dis        p-renal and gb us...ucs.....rtc 1 wk.....consider dx lap     GYN Visit & History2016     Pelvic pain: intermittent    Contraceptive counseling    urinary tract infection        Plan: Finish Keflex.    Pt did not take doxy as prescribed.    Recommended to take the full 10 days.    Reviewed u/s and labs with patient.    Track pain, continue OCP.    Return for follow up appointment in 2 months for follow up with Dr Horan or eusebiaer  if needed.    Terazol esent if needed for yeast r/t prolonged antibiotic use.     GYN Visit & History2016     27 yo........pp x 2mos.....ocp 2-3 wk....cramping w sharp exacerbagtion...right great than left...no n/v...post coital pelvic pain....fever x 1 d...pos appy 10 yr ago        a-pelvic pain....vag dc....        p-gc chl affirm ucs cbc sed rate...doxy 100 bid x 10...us....rtc 1 w     GYN Exam (Annual/6Wk PP)     Annual    Obesity        Plan: Pap with gc/ct    Adipex refilled    Zyrtec refilled.    Diet and exercise discussed.    Return for follow up appointment one year or prn.     GYN Visit & F/     Endometritis, resolved.  Obesity.  Family history of breast cancer.    Bilateral breast ultrasound at [facility].    Prescription for Contrave.    Return to clinic in one month.     GYN Exam (Annual/6Wk PP)     Annual exam.  Contraceptive management.  Strong family history of breast cancer.  Right breast cyst.  Endometritis.    CBC and sed.  Rate today.    Change birth control to Mircette 1 by mouth daily.    Doxycycline and Flagyl x2 weeks.    Ultrasound with radiology of bilateral breasts.    BRCA gene testing.     GYN Visit & F/     Irregular menstrual cycle        Plan: Beta today    If starts menses, restart OCP.    Condom use.    Return for follow up appointment prn pending results.     GYN Visit & F/     Yeast vulvovaginitis    Recent MRSA pneumonia, S/P 3 week ICU stay        Plan: Diflucan 150mg daily #1    Mycolog ointment esent    Continue PT and follow up with PCP as scheduled    Return for follow up appointment prn.     GYN Visit & F/     Bacterial Vaginosis        Plan: Flagyl 500mg I PO BID, pt states she does have rx at home    Repeat diflucan.    Pt has no insurance at this time.        Return for follow up appointment prn.     GYN Visit & [id number]     co uri...on tricycle....        o- heent adenopathy.....cx  clean        a-sp luz maria 1 ..uri        p-pap...amp 500 tid.....rtc 6 mo p/p....     GYN Visit & F/     cx bx hpv w/o dysplasia.....no tob...rtc 6 mos pap...Gardasil at hd     DAY GYN Visit + History2013     colpo-prob luz maria 2.....bx at 600/900.........rtc 1 wk     Past Surgical History:   Procedure Laterality Date    APPENDECTOMY      BRONCHOSCOPY       SECTION, LOW TRANSVERSE      DILATION AND CURETTAGE OF UTERUS N/A 2020    Procedure: DILATION AND CURETTAGE, UTERUS;  Surgeon: Srinivasan Stewart MD;  Location: [facility] OR;  Service: OB/GYN;  Laterality: N/A;    HYSTEROSCOPIC POLYPECTOMY OF UTERUS N/A 2020    Procedure: POLYPECTOMY, UTERUS, HYSTEROSCOPIC;  Surgeon: Srinivasan Stewart MD;  Location: [facility] OR;  Service: OB/GYN;  Laterality: N/A;    HYSTEROSCOPY N/A 2020    Procedure: HYSTEROSCOPY;  Surgeon: Srinivasan Stewart MD;  Location: [facility] OR;  Service: OB/GYN;  Laterality: N/A;    TONSILLECTOMY       Family History   Problem Relation Age of Onset    COPD Mother     Asthma Mother     Heart disease Mother     Heart disease Brother     Heart disease Maternal Grandfather     Diabetes Paternal Grandmother     Heart disease Paternal Grandmother     Breast cancer Paternal Aunt     Breast cancer Paternal Aunt     Breast cancer Paternal Cousin     Immunodeficiency Neg Hx     Eczema Neg Hx     Rhinitis Neg Hx        Review of patient's allergies indicates:   Allergen Reactions    Azithromycin Hives    Bactrim [sulfamethoxazole-trimethoprim]      Rash     Ciprofloxacin      Doesn't remember ? Rash     Naltrexone-bupropion Nausea And Vomiting and Other (See Comments)     hands and face numbness    Penicillins Rash     Tiny raised bumps. Not urticarial- 10 years.        Current Outpatient Medications:     norgestimate-ethinyl estradioL (ORTHO TRI-CYCLEN LO) 0.18/0.215/0.25 mg-25 mcg tablet, Take 1 tablet by mouth once daily., Disp: , Rfl:     vitamin D (VITAMIN D3) 1000 units Tab, Take 1,000  Units by mouth once daily., Disp: , Rfl:     albuterol (PROAIR HFA) 90 mcg/actuation inhaler, Inhale 2 puffs into the lungs every 4 to 6 hours as needed. (Patient not taking: Reported on 4/25/2023), Disp: 18 g, Rfl: 0    ascorbic acid, vitamin C, (VITAMIN C) 1000 MG tablet, Take 1,000 mg by mouth., Disp: , Rfl:     aspirin (ECOTRIN) 81 MG EC tablet, Take 81 mg by mouth once daily., Disp: , Rfl:     butalbital-acetaminophen-caffeine -40 mg (FIORICET, ESGIC) -40 mg per tablet, Take 1 tablet by mouth every 4 (four) hours as needed for Pain., Disp: 20 tablet, Rfl: 0    cinnamon bark 500 mg capsule, Take 500 mg by mouth once daily., Disp: , Rfl:     clonazePAM (KLONOPIN) 0.5 MG tablet, Take 1 tablet (0.5 mg total) by mouth 2 (two) times daily as needed for Anxiety., Disp: 60 tablet, Rfl: 5    dextroamphetamine-amphetamine (ADDERALL XR) 15 MG 24 hr capsule, Take 1 capsule (15 mg total) by mouth every morning., Disp: 30 capsule, Rfl: 0    EScitalopram oxalate (LEXAPRO) 10 MG tablet, Take 1 tablet (10 mg total) by mouth once daily., Disp: 90 tablet, Rfl: 3    fluticasone propionate (FLONASE) 50 mcg/actuation nasal spray, 2 sprays (100 mcg total) by Each Nostril route once daily., Disp: 18.2 mL, Rfl: 2    levocetirizine (XYZAL) 5 MG tablet, Take 1 tablet (5 mg total) by mouth once daily., Disp: 30 tablet, Rfl: 5    metoprolol succinate (TOPROL-XL) 25 MG 24 hr tablet, Take 0.5 tablets (12.5 mg total) by mouth once daily., Disp: 45 tablet, Rfl: 5    UNABLE TO FIND, Take 1 tablet by mouth once daily. Methyl-balance, Disp: , Rfl:     UNABLE TO FIND, Take 4 capsules by mouth once daily. emery & Dchiro inositol, Disp: , Rfl:     UNABLE TO FIND, Take 2 capsules by mouth once daily. Calm  Sleep capsules, Disp: , Rfl:     vitamin E 400 UNIT capsule, Take 400 Units by mouth once daily., Disp: , Rfl:     zinc gluconate 50 mg tablet, Take 50 mg by mouth., Disp: , Rfl:     Review of Systems   Constitutional:  Negative for  "chills, fever and unexpected weight change.   HENT:  Negative for ear pain, rhinorrhea and sore throat.    Eyes:  Negative for pain and visual disturbance.   Respiratory:  Negative for cough and shortness of breath.    Cardiovascular:  Negative for chest pain, palpitations and leg swelling.   Gastrointestinal:  Negative for abdominal pain, diarrhea, nausea and vomiting.   Genitourinary:  Negative for difficulty urinating, hematuria and vaginal bleeding.   Musculoskeletal:  Negative for arthralgias.   Skin:  Negative for rash.   Neurological:  Positive for headaches. Negative for dizziness and weakness.   Psychiatric/Behavioral:  Negative for agitation and sleep disturbance. The patient is nervous/anxious. The patient is not hyperactive.         Objective:      Vitals:    04/25/23 0955   BP: 130/80   Pulse: 78   SpO2: 99%   Weight: 104.3 kg (230 lb)   Height: 5' 6" (1.676 m)     Physical Exam  Vitals and nursing note reviewed.   Constitutional:       Appearance: Normal appearance. She is well-developed. She is obese.   HENT:      Head: Normocephalic.      Right Ear: External ear normal.      Left Ear: External ear normal.   Eyes:      Conjunctiva/sclera: Conjunctivae normal.      Pupils: Pupils are equal, round, and reactive to light.   Neck:      Vascular: No JVD.   Cardiovascular:      Rate and Rhythm: Normal rate and regular rhythm.      Heart sounds: No murmur heard.  Pulmonary:      Effort: Pulmonary effort is normal.      Breath sounds: Normal breath sounds.   Abdominal:      General: Bowel sounds are normal.      Palpations: Abdomen is soft.   Musculoskeletal:         General: No deformity. Normal range of motion.      Cervical back: Normal range of motion and neck supple.   Lymphadenopathy:      Cervical: No cervical adenopathy.   Skin:     General: Skin is warm and dry.      Findings: No rash.   Neurological:      Mental Status: She is alert and oriented to person, place, and time.      Gait: Gait normal. "   Psychiatric:         Speech: Speech normal.         Behavior: Behavior normal.         Assessment:       1. Recurrent major depressive disorder, in full remission    2. Anxiety    3. PCOS (polycystic ovarian syndrome)    4. Chronic rhinitis    5. Viral URI         Plan:       Recurrent major depressive disorder, in full remission    Anxiety  -     clonazePAM (KLONOPIN) 0.5 MG tablet; Take 1 tablet (0.5 mg total) by mouth 2 (two) times daily as needed for Anxiety.  Dispense: 60 tablet; Refill: 5    PCOS (polycystic ovarian syndrome)    Chronic rhinitis    Viral URI  Comments:  POCT COVID-19 swab today - NEGATIVE  Continue current medication regimen as is.  Decadron 8mg IM administered today.  Start Mucinex-DM b.i.d. x10 days.  Orders:  -     fluticasone propionate (FLONASE) 50 mcg/actuation nasal spray; 2 sprays (100 mcg total) by Each Nostril route once daily.  Dispense: 18.2 mL; Refill: 2    Other orders  -     EScitalopram oxalate (LEXAPRO) 10 MG tablet; Take 1 tablet (10 mg total) by mouth once daily.  Dispense: 90 tablet; Refill: 3  -     levocetirizine (XYZAL) 5 MG tablet; Take 1 tablet (5 mg total) by mouth once daily.  Dispense: 30 tablet; Refill: 5  -     metoprolol succinate (TOPROL-XL) 25 MG 24 hr tablet; Take 0.5 tablets (12.5 mg total) by mouth once daily.  Dispense: 45 tablet; Refill: 5  -     butalbital-acetaminophen-caffeine -40 mg (FIORICET, ESGIC) -40 mg per tablet; Take 1 tablet by mouth every 4 (four) hours as needed for Pain.  Dispense: 20 tablet; Refill: 0  -     dextroamphetamine-amphetamine (ADDERALL XR) 15 MG 24 hr capsule; Take 1 capsule (15 mg total) by mouth every morning.  Dispense: 30 capsule; Refill: 0      Follow up in about 4 weeks (around 5/23/2023), or if symptoms worsen or fail to improve, for medication management.        4/30/2023 Peace Gray

## 2023-05-24 ENCOUNTER — OFFICE VISIT (OUTPATIENT)
Dept: FAMILY MEDICINE | Facility: CLINIC | Age: 33
End: 2023-05-24
Payer: COMMERCIAL

## 2023-05-24 VITALS
SYSTOLIC BLOOD PRESSURE: 130 MMHG | BODY MASS INDEX: 36.64 KG/M2 | WEIGHT: 228 LBS | OXYGEN SATURATION: 99 % | DIASTOLIC BLOOD PRESSURE: 82 MMHG | HEIGHT: 66 IN | HEART RATE: 92 BPM

## 2023-05-24 DIAGNOSIS — E28.2 PCOS (POLYCYSTIC OVARIAN SYNDROME): ICD-10-CM

## 2023-05-24 DIAGNOSIS — Z00.00 PHYSICAL EXAM: ICD-10-CM

## 2023-05-24 DIAGNOSIS — B99.9 RECURRENT INFECTIONS: ICD-10-CM

## 2023-05-24 DIAGNOSIS — F98.8 ATTENTION DEFICIT DISORDER, UNSPECIFIED HYPERACTIVITY PRESENCE: ICD-10-CM

## 2023-05-24 DIAGNOSIS — F41.9 ANXIETY: Primary | ICD-10-CM

## 2023-05-24 DIAGNOSIS — Z79.899 HIGH RISK MEDICATION USE: ICD-10-CM

## 2023-05-24 DIAGNOSIS — Z87.01 HISTORY OF PNEUMONIA: ICD-10-CM

## 2023-05-24 PROCEDURE — 3075F PR MOST RECENT SYSTOLIC BLOOD PRESS GE 130-139MM HG: ICD-10-PCS | Mod: CPTII,S$GLB,, | Performed by: NURSE PRACTITIONER

## 2023-05-24 PROCEDURE — 1160F PR REVIEW ALL MEDS BY PRESCRIBER/CLIN PHARMACIST DOCUMENTED: ICD-10-PCS | Mod: CPTII,S$GLB,, | Performed by: NURSE PRACTITIONER

## 2023-05-24 PROCEDURE — 99213 PR OFFICE/OUTPT VISIT, EST, LEVL III, 20-29 MIN: ICD-10-PCS | Mod: S$GLB,,, | Performed by: NURSE PRACTITIONER

## 2023-05-24 PROCEDURE — 1160F RVW MEDS BY RX/DR IN RCRD: CPT | Mod: CPTII,S$GLB,, | Performed by: NURSE PRACTITIONER

## 2023-05-24 PROCEDURE — 3008F BODY MASS INDEX DOCD: CPT | Mod: CPTII,S$GLB,, | Performed by: NURSE PRACTITIONER

## 2023-05-24 PROCEDURE — 3079F DIAST BP 80-89 MM HG: CPT | Mod: CPTII,S$GLB,, | Performed by: NURSE PRACTITIONER

## 2023-05-24 PROCEDURE — 3079F PR MOST RECENT DIASTOLIC BLOOD PRESSURE 80-89 MM HG: ICD-10-PCS | Mod: CPTII,S$GLB,, | Performed by: NURSE PRACTITIONER

## 2023-05-24 PROCEDURE — 99213 OFFICE O/P EST LOW 20 MIN: CPT | Mod: S$GLB,,, | Performed by: NURSE PRACTITIONER

## 2023-05-24 PROCEDURE — 3075F SYST BP GE 130 - 139MM HG: CPT | Mod: CPTII,S$GLB,, | Performed by: NURSE PRACTITIONER

## 2023-05-24 PROCEDURE — 3008F PR BODY MASS INDEX (BMI) DOCUMENTED: ICD-10-PCS | Mod: CPTII,S$GLB,, | Performed by: NURSE PRACTITIONER

## 2023-05-24 PROCEDURE — 1159F PR MEDICATION LIST DOCUMENTED IN MEDICAL RECORD: ICD-10-PCS | Mod: CPTII,S$GLB,, | Performed by: NURSE PRACTITIONER

## 2023-05-24 PROCEDURE — 1159F MED LIST DOCD IN RCRD: CPT | Mod: CPTII,S$GLB,, | Performed by: NURSE PRACTITIONER

## 2023-05-24 RX ORDER — DEXTROAMPHETAMINE SACCHARATE, AMPHETAMINE ASPARTATE MONOHYDRATE, DEXTROAMPHETAMINE SULFATE AND AMPHETAMINE SULFATE 3.75; 3.75; 3.75; 3.75 MG/1; MG/1; MG/1; MG/1
15 CAPSULE, EXTENDED RELEASE ORAL EVERY MORNING
Qty: 30 CAPSULE | Refills: 0 | Status: SHIPPED | OUTPATIENT
Start: 2023-05-24 | End: 2023-07-20 | Stop reason: SDUPTHER

## 2023-05-24 NOTE — PROGRESS NOTES
SUBJECTIVE:    Patient ID: Oneida Ingram is a 33 y.o. female.    Chief Complaint: Follow-up (No bottles/ 4 week f/u no complaints/bg)    33-year-old female presents for follow up. She has a PMHx of Anxiety, MDD, and PCOS .  About 4 weeks ago seen in office and was started on adderall. Since starting has noticed an improvement at work. Able to complete tasks. No side effects. Sleeping fine.   Due for pneumonia vaccine      Telephone on 03/08/2023   Component Date Value Ref Range Status    WBC 03/13/2023 7.4  3.8 - 10.8 Thousand/uL Final    RBC 03/13/2023 5.21 (H)  3.80 - 5.10 Million/uL Final    Hemoglobin 03/13/2023 15.0  11.7 - 15.5 g/dL Final    Hematocrit 03/13/2023 46.3 (H)  35.0 - 45.0 % Final    MCV 03/13/2023 88.9  80.0 - 100.0 fL Final    MCH 03/13/2023 28.8  27.0 - 33.0 pg Final    MCHC 03/13/2023 32.4  32.0 - 36.0 g/dL Final    RDW 03/13/2023 12.6  11.0 - 15.0 % Final    Platelets 03/13/2023 351  140 - 400 Thousand/uL Final    MPV 03/13/2023 11.6  7.5 - 12.5 fL Final    Neutrophils, Abs 03/13/2023 4,203  1,500 - 7,800 cells/uL Final    Lymph # 03/13/2023 2,701  850 - 3,900 cells/uL Final    Mono # 03/13/2023 385  200 - 950 cells/uL Final    Eos # 03/13/2023 74  15 - 500 cells/uL Final    Baso # 03/13/2023 37  0 - 200 cells/uL Final    Neutrophils Relative 03/13/2023 56.8  % Final    Lymph % 03/13/2023 36.5  % Final    Mono % 03/13/2023 5.2  % Final    Eosinophil % 03/13/2023 1.0  % Final    Basophil % 03/13/2023 0.5  % Final    Glucose 03/13/2023 82  65 - 99 mg/dL Final    BUN 03/13/2023 15  7 - 25 mg/dL Final    Creatinine 03/13/2023 0.60  0.50 - 0.97 mg/dL Final    eGFR 03/13/2023 123  > OR = 60 mL/min/1.73m2 Final    BUN/Creatinine Ratio 03/13/2023 NOT APPLICABLE  6 - 22 (calc) Final    Sodium 03/13/2023 140  135 - 146 mmol/L Final    Potassium 03/13/2023 4.2  3.5 - 5.3 mmol/L Final    Chloride 03/13/2023 106  98 - 110 mmol/L Final    CO2 03/13/2023 23  20 - 32 mmol/L Final    Calcium 03/13/2023 9.5   8.6 - 10.2 mg/dL Final    Total Protein 03/13/2023 7.1  6.1 - 8.1 g/dL Final    Albumin 03/13/2023 4.2  3.6 - 5.1 g/dL Final    Globulin, Total 03/13/2023 2.9  1.9 - 3.7 g/dL (calc) Final    Albumin/Globulin Ratio 03/13/2023 1.4  1.0 - 2.5 (calc) Final    Total Bilirubin 03/13/2023 0.6  0.2 - 1.2 mg/dL Final    Alkaline Phosphatase 03/13/2023 53  31 - 125 U/L Final    AST 03/13/2023 34 (H)  10 - 30 U/L Final    ALT 03/13/2023 43 (H)  6 - 29 U/L Final    Cholesterol 03/13/2023 185  <200 mg/dL Final    HDL 03/13/2023 39 (L)  > OR = 50 mg/dL Final    Triglycerides 03/13/2023 181 (H)  <150 mg/dL Final    LDL Cholesterol 03/13/2023 116 (H)  mg/dL (calc) Final    HDL/Cholesterol Ratio 03/13/2023 4.7  <5.0 (calc) Final    Non HDL Chol. (LDL+VLDL) 03/13/2023 146 (H)  <130 mg/dL (calc) Final    TSH w/reflex to FT4 03/13/2023 1.55  mIU/L Final    Color, UA 03/13/2023 DARK YELLOW  YELLOW Final    Appearance, UA 03/13/2023 CLEAR  CLEAR Final    Specific Gravity, UA 03/13/2023 1.027  1.001 - 1.035 Final    pH, UA 03/13/2023 5.5  5.0 - 8.0 Final    Glucose, UA 03/13/2023 NEGATIVE  NEGATIVE Final    Bilirubin, UA 03/13/2023 NEGATIVE  NEGATIVE Final    Ketones, UA 03/13/2023 NEGATIVE  NEGATIVE Final    Occult Blood UA 03/13/2023 NEGATIVE  NEGATIVE Final    Protein, UA 03/13/2023 NEGATIVE  NEGATIVE Final    Nitrite, UA 03/13/2023 NEGATIVE  NEGATIVE Final    Leukocytes, UA 03/13/2023 NEGATIVE  NEGATIVE Final    WBC Casts, UA 03/13/2023 NONE SEEN  < OR = 5 /HPF Final    RBC Casts, UA 03/13/2023 NONE SEEN  < OR = 2 /HPF Final    Squam Epithel, UA 03/13/2023 0-5  < OR = 5 /HPF Final    Bacteria, UA 03/13/2023 NONE SEEN  NONE SEEN /HPF Final    Hyaline Casts, UA 03/13/2023 NONE SEEN  NONE SEEN /LPF Final    Service Cmt: 03/13/2023    Final    Reflexive Urine Culture 03/13/2023    Final   Office Visit on 01/11/2023   Component Date Value Ref Range Status    Cortisol 01/11/2023 9.2  mcg/dL Final    Vitamin D, 25-OH, Total 01/11/2023 42  30  - 100 ng/mL Final       Past Medical History:   Diagnosis Date    Anxiety     MRSA pneumonia     Pneumonia      Social History     Socioeconomic History    Marital status:    Tobacco Use    Smoking status: Never    Smokeless tobacco: Never   Substance and Sexual Activity    Alcohol use: Yes     Comment: socially    Drug use: No    Sexual activity: Yes     Partners: Male     Birth control/protection: None   Social History Narrative    Plans from [facility]         GYN Visit & History 10 [facility]1/7/2019     Obesity    PCOS        Visit Summary:    Down 5 pounds    UDS/ reviewed    Reviewed labs with patient    Discussed diet and exercise        Prescriptions:    SIG: metformin 500 mg oral tablet, 30 days, Dispense #120 Tablet, 5 Refills    Directions: take once daily for 2 weeks then increase to BID for 2 weeks, then increase to 2 po BID    SIG: phentermine 37.5 mg oral tablet, 30 days, Dispense #30 Tablet, 0 Refills    Directions: Take 1 oral tablet once a day        Plan:    Return for follow up appointment in 1 month        Nancy Saravia NP     GYN Visit & History 10 [facility]0/2/2019     Obesity    Irregular menses    hx of ovarian cysts        Visit Summary:    PCOS panel 1 week prior to return appointment    UDS/ reviewed    Discussed diet and exercise at length        Prescriptions:    SIG: phentermine 37.5 mg oral tablet, 30 days, Dispense #30 Tablet, 0 Refills    Directions: Take 1 oral tablet once a day    SIG: Prenatal 28 mg iron- 800 mcg oral tablet, 30 days, Dispense #30 Tablet, 0 Refills    Directions: Take 1 oral tablet once a day        Plan:    Return for follow up appointment in1 month    Weight loss goal set for 8-10 pounds        Nancy Saravia NP     GYN Exam (Annual/6Wk PP)[facility]2/13/2016     Annual    Depression/Anxiety    Obesity    Break-through bleeding with OCP        Visit Summary    Ordered:    Blood Drawing    Pap IG, Ct-Ng, rfx HPV ASCU    CBC With Differential/Platelet     Glucose, Serum    Lipid Panel    UA w/o Micro: within normal limits    UPT: negative        Prescriptions:    SIG: NuvaRing 0.12-0.015 mg/24 hr ring,  days, Dispense #1 Ring, 3 Refills    Directions: one ring vaginally x 21 days, then remove and discard. Leave out for 7 days, then insert new ring.    Zoloft working for depression management.    Pt has a PCP for depression management, discussed counseling, exercise etc.        Return for follow up appointment in one year or prn.     GYN Visit & [id number] [facility]2016     [facility] k pneumonia. w same sensitivies as k pneumonia uti in .........gb us nl...renal us nl....co persisatnt pp...and frequency and urgency and nocgturia.....modeerat...x 1 y        a-relapsing k npuemonia uti,,..diarrhea x 1 mo            p-cipro 500 mg bid x 10 d...rtc 3 w for repaeat [facility] and if still [prese nt will refer to dr mccallum......stool for ova and parasite and c deficil     GYN Visit & [id number] [facility]2016     25 yo........daily cramping in pelvis ...moderate....intermittendt and daily....duration 1 h....not worse w activity...u/s nl...on ocp.....associated w dysmen...requires advil and improves w nsaia....no dysp...no dysuria....frequency and urgency..x 1 yr, not worsening, but w apin associated with right cvat....h/o kleb uti ......sedc rate 14 on ....bm 2-4x/d, x 2 mos....sp cs...appy....ho mrsa pneumonia w intubation and blood transfusion 2 yrs....        a-pelvic p[ain, dysmenorrhea, urinary frequency and urgency....poss renal etiology...r/o gb and renal dis        p-renal and gb us...ucs.....rtc 1 wk.....consider dx lap     GYN Visit & History2016     Pelvic pain: intermittent    Contraceptive counseling    urinary tract infection        Plan: Finish Keflex.    Pt did not take doxy as prescribed.    Recommended to take the full 10 days.    Reviewed u/s and labs with patient.    Track pain, continue OCP.    Return for follow up appointment in 2 months for  follow up with Dr Horan or sooner if needed.    Terazol esent if needed for yeast r/t prolonged antibiotic use.     GYN Visit & History2016     27 yo........pp x 2mos.....ocp 2-3 wk....cramping w sharp exacerbagtion...right great than left...no n/v...post coital pelvic pain....fever x 1 d...pos appy 10 yr ago        a-pelvic pain....vag dc....        p-gc chl affirm ucs cbc sed rate...doxy 100 bid x 10...us....rtc 1 w     GYN Exam (Annual/6Wk PP)     Annual    Obesity        Plan: Pap with gc/ct    Adipex refilled    Zyrtec refilled.    Diet and exercise discussed.    Return for follow up appointment one year or prn.     GYN Visit & F/     Endometritis, resolved.  Obesity.  Family history of breast cancer.    Bilateral breast ultrasound at [facility].    Prescription for Contrave.    Return to clinic in one month.     GYN Exam (Annual/6Wk PP)     Annual exam.  Contraceptive management.  Strong family history of breast cancer.  Right breast cyst.  Endometritis.    CBC and sed.  Rate today.    Change birth control to Mircette 1 by mouth daily.    Doxycycline and Flagyl x2 weeks.    Ultrasound with radiology of bilateral breasts.    BRCA gene testing.     GYN Visit & F/     Irregular menstrual cycle        Plan: Beta today    If starts menses, restart OCP.    Condom use.    Return for follow up appointment prn pending results.     GYN Visit & F/     Yeast vulvovaginitis    Recent MRSA pneumonia, S/P 3 week ICU stay        Plan: Diflucan 150mg daily #1    Mycolog ointment esent    Continue PT and follow up with PCP as scheduled    Return for follow up appointment prn.     GYN Visit & F/     Bacterial Vaginosis        Plan: Flagyl 500mg I PO BID, pt states she does have rx at home    Repeat diflucan.    Pt has no insurance at this time.        Return for follow up appointment prn.     GYN Visit & [id number]     co uri...on tricycle....         o- heent adenopathy.....cx clean        a-sp luz maria 1 ..uri        p-pap...amp 500 tid.....rtc 6 mo p/p....     GYN Visit & F/     cx bx hpv w/o dysplasia.....no tob...rtc 6 mos pap...Gardasil at hd     DAY GYN Visit + History2013     colpo-prob luz maria 2.....bx at 600/900.........rtc 1 wk     Past Surgical History:   Procedure Laterality Date    APPENDECTOMY      BRONCHOSCOPY       SECTION, LOW TRANSVERSE      DILATION AND CURETTAGE OF UTERUS N/A 2020    Procedure: DILATION AND CURETTAGE, UTERUS;  Surgeon: Srinivasan Stewart MD;  Location: [facility] OR;  Service: OB/GYN;  Laterality: N/A;    HYSTEROSCOPIC POLYPECTOMY OF UTERUS N/A 2020    Procedure: POLYPECTOMY, UTERUS, HYSTEROSCOPIC;  Surgeon: Srinivasan Stewart MD;  Location: [facility] OR;  Service: OB/GYN;  Laterality: N/A;    HYSTEROSCOPY N/A 2020    Procedure: HYSTEROSCOPY;  Surgeon: Srinivasan Stewart MD;  Location: [facility] OR;  Service: OB/GYN;  Laterality: N/A;    TONSILLECTOMY       Family History   Problem Relation Age of Onset    COPD Mother     Asthma Mother     Heart disease Mother     Heart disease Brother     Heart disease Maternal Grandfather     Diabetes Paternal Grandmother     Heart disease Paternal Grandmother     Breast cancer Paternal Aunt     Breast cancer Paternal Aunt     Breast cancer Paternal Cousin     Immunodeficiency Neg Hx     Eczema Neg Hx     Rhinitis Neg Hx        Review of patient's allergies indicates:   Allergen Reactions    Azithromycin Hives    Bactrim [sulfamethoxazole-trimethoprim]      Rash     Ciprofloxacin      Doesn't remember ? Rash     Naltrexone-bupropion Nausea And Vomiting and Other (See Comments)     hands and face numbness    Penicillins Rash     Tiny raised bumps. Not urticarial- 10 years.        Current Outpatient Medications:     albuterol (PROAIR HFA) 90 mcg/actuation inhaler, Inhale 2 puffs into the lungs every 4 to 6 hours as needed. (Patient not taking: Reported on 2023), Disp:  18 g, Rfl: 0    ascorbic acid, vitamin C, (VITAMIN C) 1000 MG tablet, Take 1,000 mg by mouth., Disp: , Rfl:     aspirin (ECOTRIN) 81 MG EC tablet, Take 81 mg by mouth once daily., Disp: , Rfl:     cinnamon bark 500 mg capsule, Take 500 mg by mouth once daily., Disp: , Rfl:     clonazePAM (KLONOPIN) 0.5 MG tablet, Take 1 tablet (0.5 mg total) by mouth 2 (two) times daily as needed for Anxiety., Disp: 60 tablet, Rfl: 5    dextroamphetamine-amphetamine (ADDERALL XR) 15 MG 24 hr capsule, Take 1 capsule (15 mg total) by mouth every morning., Disp: 30 capsule, Rfl: 0    EScitalopram oxalate (LEXAPRO) 10 MG tablet, Take 1 tablet (10 mg total) by mouth once daily., Disp: 90 tablet, Rfl: 3    fluticasone propionate (FLONASE) 50 mcg/actuation nasal spray, 2 sprays (100 mcg total) by Each Nostril route once daily., Disp: 18.2 mL, Rfl: 2    levocetirizine (XYZAL) 5 MG tablet, Take 1 tablet (5 mg total) by mouth once daily., Disp: 30 tablet, Rfl: 5    metoprolol succinate (TOPROL-XL) 25 MG 24 hr tablet, Take 0.5 tablets (12.5 mg total) by mouth once daily., Disp: 45 tablet, Rfl: 5    norgestimate-ethinyl estradioL (ORTHO TRI-CYCLEN LO) 0.18/0.215/0.25 mg-25 mcg tablet, Take 1 tablet by mouth once daily., Disp: , Rfl:     UNABLE TO FIND, Take 1 tablet by mouth once daily. Methyl-balance, Disp: , Rfl:     UNABLE TO FIND, Take 4 capsules by mouth once daily. emery & Dchiro inositol, Disp: , Rfl:     UNABLE TO FIND, Take 2 capsules by mouth once daily. Calm  Sleep capsules, Disp: , Rfl:     vitamin D (VITAMIN D3) 1000 units Tab, Take 1,000 Units by mouth once daily., Disp: , Rfl:     vitamin E 400 UNIT capsule, Take 400 Units by mouth once daily., Disp: , Rfl:     zinc gluconate 50 mg tablet, Take 50 mg by mouth., Disp: , Rfl:     Review of Systems   Constitutional:  Negative for chills, fever and unexpected weight change.   HENT:  Negative for ear pain, rhinorrhea and sore throat.    Eyes:  Negative for pain and visual disturbance.  "  Respiratory:  Negative for cough and shortness of breath.    Cardiovascular:  Negative for chest pain, palpitations and leg swelling.   Gastrointestinal:  Negative for abdominal pain, diarrhea, nausea and vomiting.   Genitourinary:  Negative for difficulty urinating, hematuria and vaginal bleeding.   Musculoskeletal:  Negative for arthralgias.   Skin:  Negative for rash.   Neurological:  Negative for dizziness, weakness and headaches.   Psychiatric/Behavioral:  Negative for agitation and sleep disturbance. The patient is not nervous/anxious.         Objective:      Vitals:    05/24/23 1221   BP: 130/82   Pulse: 92   SpO2: 99%   Weight: 103.4 kg (228 lb)   Height: 5' 6" (1.676 m)     Physical Exam  Vitals and nursing note reviewed.   Constitutional:       General: She is not in acute distress.     Appearance: Normal appearance. She is well-developed.   HENT:      Right Ear: External ear normal.      Left Ear: External ear normal.   Eyes:      Conjunctiva/sclera: Conjunctivae normal.      Pupils: Pupils are equal, round, and reactive to light.   Neck:      Vascular: No JVD.   Cardiovascular:      Rate and Rhythm: Normal rate and regular rhythm.      Heart sounds: No murmur heard.  Pulmonary:      Effort: Pulmonary effort is normal.      Breath sounds: Normal breath sounds.   Abdominal:      General: Bowel sounds are normal.      Palpations: Abdomen is soft.   Musculoskeletal:         General: No deformity. Normal range of motion.      Cervical back: Normal range of motion and neck supple.   Lymphadenopathy:      Cervical: No cervical adenopathy.   Skin:     General: Skin is warm and dry.      Findings: No rash.   Neurological:      Mental Status: She is alert and oriented to person, place, and time.      Gait: Gait normal.   Psychiatric:         Speech: Speech normal.         Behavior: Behavior normal.         Assessment:       1. Anxiety    2. PCOS (polycystic ovarian syndrome)    3. Physical exam    4. High risk " medication use    5. Attention deficit disorder, unspecified hyperactivity presence    6. History of pneumonia    7. Recurrent infections         Plan:       Anxiety  Comments:  klonopin as needed  lexapro controlling symptoms    PCOS (polycystic ovarian syndrome)    Physical exam    High risk medication use    Attention deficit disorder, unspecified hyperactivity presence  Comments:  continue adderall    History of pneumonia  Comments:  pneumonia vaccine given today    Recurrent infections    Other orders  -     dextroamphetamine-amphetamine (ADDERALL XR) 15 MG 24 hr capsule; Take 1 capsule (15 mg total) by mouth every morning.  Dispense: 30 capsule; Refill: 0  -     (In Office Administered) Pneumococcal Conjugate Vaccine (20 Valent) (IM)      Follow up in about 6 months (around 11/24/2023) for medication management.        6/6/2023 Peace Gray

## 2023-05-31 PROCEDURE — 90471 PNEUMOCOCCAL CONJUGATE VACCINE 20-VALENT: ICD-10-PCS | Mod: S$GLB,,, | Performed by: NURSE PRACTITIONER

## 2023-05-31 PROCEDURE — 90677 PNEUMOCOCCAL CONJUGATE VACCINE 20-VALENT: ICD-10-PCS | Mod: S$GLB,,, | Performed by: NURSE PRACTITIONER

## 2023-05-31 PROCEDURE — 90471 IMMUNIZATION ADMIN: CPT | Mod: S$GLB,,, | Performed by: NURSE PRACTITIONER

## 2023-05-31 PROCEDURE — 90677 PCV20 VACCINE IM: CPT | Mod: S$GLB,,, | Performed by: NURSE PRACTITIONER

## 2023-06-12 LAB
HUMAN PAPILLOMAVIRUS (HPV): NORMAL
PAP RECOMMENDATION EXT: ABNORMAL
PAP SMEAR: ABNORMAL

## 2023-06-28 ENCOUNTER — PATIENT MESSAGE (OUTPATIENT)
Dept: FAMILY MEDICINE | Facility: CLINIC | Age: 33
End: 2023-06-28

## 2023-06-28 RX ORDER — METHYLPREDNISOLONE 4 MG/1
TABLET ORAL
Qty: 21 EACH | Refills: 0 | Status: SHIPPED | OUTPATIENT
Start: 2023-06-28 | End: 2023-07-19

## 2023-06-29 ENCOUNTER — OFFICE VISIT (OUTPATIENT)
Dept: URGENT CARE | Facility: CLINIC | Age: 33
End: 2023-06-29
Payer: COMMERCIAL

## 2023-06-29 VITALS
HEART RATE: 106 BPM | WEIGHT: 228 LBS | DIASTOLIC BLOOD PRESSURE: 86 MMHG | RESPIRATION RATE: 16 BRPM | BODY MASS INDEX: 36.8 KG/M2 | OXYGEN SATURATION: 97 % | TEMPERATURE: 98 F | SYSTOLIC BLOOD PRESSURE: 122 MMHG

## 2023-06-29 DIAGNOSIS — H66.92 ACUTE OTITIS MEDIA, LEFT: ICD-10-CM

## 2023-06-29 DIAGNOSIS — J01.41 ACUTE RECURRENT PANSINUSITIS: Primary | ICD-10-CM

## 2023-06-29 PROCEDURE — 99213 PR OFFICE/OUTPT VISIT, EST, LEVL III, 20-29 MIN: ICD-10-PCS | Mod: 25,S$GLB,, | Performed by: STUDENT IN AN ORGANIZED HEALTH CARE EDUCATION/TRAINING PROGRAM

## 2023-06-29 PROCEDURE — 96372 THER/PROPH/DIAG INJ SC/IM: CPT | Mod: S$GLB,,, | Performed by: STUDENT IN AN ORGANIZED HEALTH CARE EDUCATION/TRAINING PROGRAM

## 2023-06-29 PROCEDURE — 96372 PR INJECTION,THERAP/PROPH/DIAG2ST, IM OR SUBCUT: ICD-10-PCS | Mod: S$GLB,,, | Performed by: STUDENT IN AN ORGANIZED HEALTH CARE EDUCATION/TRAINING PROGRAM

## 2023-06-29 PROCEDURE — 99213 OFFICE O/P EST LOW 20 MIN: CPT | Mod: 25,S$GLB,, | Performed by: STUDENT IN AN ORGANIZED HEALTH CARE EDUCATION/TRAINING PROGRAM

## 2023-06-29 RX ORDER — BROMPHENIRAMINE MALEATE, PSEUDOEPHEDRINE HYDROCHLORIDE, AND DEXTROMETHORPHAN HYDROBROMIDE 2; 30; 10 MG/5ML; MG/5ML; MG/5ML
5 SYRUP ORAL
Qty: 118 ML | Refills: 0 | Status: SHIPPED | OUTPATIENT
Start: 2023-06-29 | End: 2023-07-09

## 2023-06-29 RX ORDER — CEFDINIR 300 MG/1
300 CAPSULE ORAL 2 TIMES DAILY
Qty: 20 CAPSULE | Refills: 0 | Status: SHIPPED | OUTPATIENT
Start: 2023-06-29 | End: 2023-07-09

## 2023-06-29 RX ORDER — DEXAMETHASONE SODIUM PHOSPHATE 4 MG/ML
8 INJECTION, SOLUTION INTRA-ARTICULAR; INTRALESIONAL; INTRAMUSCULAR; INTRAVENOUS; SOFT TISSUE
Status: COMPLETED | OUTPATIENT
Start: 2023-06-29 | End: 2023-06-29

## 2023-06-29 RX ADMIN — DEXAMETHASONE SODIUM PHOSPHATE 8 MG: 4 INJECTION, SOLUTION INTRA-ARTICULAR; INTRALESIONAL; INTRAMUSCULAR; INTRAVENOUS; SOFT TISSUE at 11:06

## 2023-06-29 NOTE — PROGRESS NOTES
Subjective:      Patient ID: Oneida Ingram is a 33 y.o. female.    Vitals:  weight is 103.4 kg (228 lb). Her oral temperature is 98.4 °F (36.9 °C). Her blood pressure is 122/86 and her pulse is 106. Her respiration is 16 and oxygen saturation is 97%.     Chief Complaint: Sinus Problem (@ home covid test negative)    Patient is a 33-year-old female with past medical history of anxiety, depression, and PCOS who presents to clinic for evaluation of sinus related issues.  Patient reports she is vaccinated.  Patient reports no recent or known sick exposures.  Patient states she has been taking Flonase and Xyzal with only slight relief to symptoms.  Patient reports symptoms greater than 1 week however worse over the past 3 days.  Patient states she went to a chiropractor to see if adjustment and sinus massage could help however this only helped for a few hours.  Patient states that she has experienced fatigue, ear pain, nasal sinus congestion with postnasal drainage, sinus pressure, sore throat, hoarseness, intermittently productive cough, sneezing, and headaches.  Patient denies any acute dizziness, body aches, fever or chills, ear drainage, tinnitus or hearing loss, drooling or painful swallowing, chest pain or palpitations, shortness of breath or presence of abnormal breath sounds, abdominal pain, nausea or vomiting, diarrhea, urinary symptoms, rash, or change in mentation.  Patient states that she took 2 home COVID tests which were both negative.    Sinus Problem  This is a new problem. The current episode started 1 to 4 weeks ago. The problem is unchanged. There has been no fever. Associated symptoms include congestion, coughing, ear pain, headaches, sinus pressure, sneezing and a sore throat (Scratchy throat). Pertinent negatives include no chills, diaphoresis or shortness of breath.     Constitution: Positive for fatigue. Negative for chills, sweating and fever.   HENT:  Positive for ear pain, congestion, postnasal  drip, sinus pressure, sore throat (Scratchy throat) and voice change (Hoarseness). Negative for ear discharge, tinnitus, hearing loss, drooling and trouble swallowing.    Neck: neck negative.   Cardiovascular: Negative.  Negative for chest pain and palpitations.   Eyes: Negative.    Respiratory:  Positive for cough and sputum production (Intermittently). Negative for chest tightness and shortness of breath.    Gastrointestinal: Negative.  Negative for abdominal pain, nausea, vomiting and diarrhea.   Endocrine: negative.   Genitourinary: Negative.    Musculoskeletal: Negative.  Negative for muscle ache.   Skin: Negative.  Negative for color change, pale, rash and erythema.   Allergic/Immunologic: Positive for sneezing.   Neurological:  Positive for headaches. Negative for dizziness, light-headedness, passing out, disorientation and altered mental status.   Hematologic/Lymphatic: Negative.    Psychiatric/Behavioral: Negative.  Negative for altered mental status, disorientation and confusion.     Objective:     Physical Exam   Constitutional: She is oriented to person, place, and time. She appears well-developed. She is cooperative.  Non-toxic appearance. She appears ill. No distress. obesity  HENT:   Head: Normocephalic and atraumatic.   Ears:   Right Ear: Hearing, external ear and ear canal normal. A middle ear effusion is present.   Left Ear: Hearing, external ear and ear canal normal. Tympanic membrane is erythematous and bulging.   Nose: Congestion present. No mucosal edema, rhinorrhea or nasal deformity. No epistaxis. Right sinus exhibits maxillary sinus tenderness and frontal sinus tenderness. Left sinus exhibits maxillary sinus tenderness and frontal sinus tenderness.   Mouth/Throat: Uvula is midline and mucous membranes are normal. Mucous membranes are moist. No trismus in the jaw. Normal dentition. No uvula swelling. Posterior oropharyngeal erythema present. No oropharyngeal exudate. Oropharynx is clear.    Eyes: Conjunctivae and lids are normal. Pupils are equal, round, and reactive to light. Right eye exhibits no discharge. Left eye exhibits no discharge. No scleral icterus.   Neck: Trachea normal and phonation normal. Neck supple. No neck rigidity present.   Cardiovascular: Regular rhythm, normal heart sounds and normal pulses. Tachycardia present.   Pulmonary/Chest: Effort normal and breath sounds normal. No respiratory distress (Unlabored.  Equal rise and fall of chest.  Able speak in full complete sentences.  No adventitious breath sounds noted.). She has no wheezes. She has no rhonchi. She has no rales.   Abdominal: Normal appearance and bowel sounds are normal. She exhibits no distension. Soft. There is no abdominal tenderness.   Musculoskeletal: Normal range of motion.         General: Normal range of motion.      Cervical back: She exhibits no tenderness.   Lymphadenopathy:     She has no cervical adenopathy.   Neurological: She is alert and oriented to person, place, and time. She exhibits normal muscle tone.   Skin: Skin is warm, dry, intact, not diaphoretic, not pale and no rash. Capillary refill takes less than 2 seconds. No erythema   Psychiatric: Her speech is normal and behavior is normal. Judgment and thought content normal.   Nursing note and vitals reviewed.    Assessment:     1. Acute recurrent pansinusitis    2. Acute otitis media, left        Plan:       Acute recurrent pansinusitis    Acute otitis media, left    Other orders  -     cefdinir (OMNICEF) 300 MG capsule; Take 1 capsule (300 mg total) by mouth 2 (two) times daily. for 10 days  Dispense: 20 capsule; Refill: 0  -     brompheniramine-pseudoeph-DM (BROMFED DM) 2-30-10 mg/5 mL Syrp; Take 5 mLs by mouth every 4 to 6 hours as needed (Cough/congestion).  Dispense: 118 mL; Refill: 0  -     dexAMETHasone injection 8 mg                Labs:  Patient refused any point of care testing.    Patient requesting steroid injections stating she does  not want the steroid pack which was previously sent in yesterday to 08/20/2023.    Take medications as prescribed.     checked through epic.    Tylenol/Motrin per package instructions for any pain or fever.    Nasal saline flushes or irrigation as directed.    Follow-up with PCP in 1-2 days.    Return to clinic as needed.    To ED for any new acutely worsening symptoms.    Patient in agreement with plan of care.    DISCLAIMER: Please note that my documentation in this Electronic Healthcare Record was produced using speech recognition software and therefore may contain errors related to that software system.These could include grammar, punctuation and spelling errors or the inclusion/exclusion of phrases that were not intended. Garbled syntax, mangled pronouns, and other bizarre constructions may be attributed to that software system.

## 2023-07-20 ENCOUNTER — PATIENT MESSAGE (OUTPATIENT)
Dept: FAMILY MEDICINE | Facility: CLINIC | Age: 33
End: 2023-07-20

## 2023-07-20 RX ORDER — DEXTROAMPHETAMINE SACCHARATE, AMPHETAMINE ASPARTATE MONOHYDRATE, DEXTROAMPHETAMINE SULFATE AND AMPHETAMINE SULFATE 3.75; 3.75; 3.75; 3.75 MG/1; MG/1; MG/1; MG/1
15 CAPSULE, EXTENDED RELEASE ORAL EVERY MORNING
Qty: 30 CAPSULE | Refills: 0 | Status: SHIPPED | OUTPATIENT
Start: 2023-07-20 | End: 2023-11-27

## 2023-09-26 ENCOUNTER — PATIENT MESSAGE (OUTPATIENT)
Dept: FAMILY MEDICINE | Facility: CLINIC | Age: 33
End: 2023-09-26

## 2023-09-26 DIAGNOSIS — Z12.11 SPECIAL SCREENING FOR MALIGNANT NEOPLASMS, COLON: Primary | ICD-10-CM

## 2023-10-05 NOTE — ED NOTES
Patient has been updated on CT results. No needs or questions at this time.    Detail Level: Detailed Depth Of Biopsy: dermis Was A Bandage Applied: Yes Size Of Lesion In Cm: 0 Biopsy Type: H and E Biopsy Method: Dermablade Anesthesia Type: 1% lidocaine with epinephrine Anesthesia Volume In Cc (Will Not Render If 0): 0.5 Hemostasis: Drysol Wound Care: Petrolatum Dressing: bandage Destruction After The Procedure: No Type Of Destruction Used: Curettage Curettage Text: The wound bed was treated with curettage after the biopsy was performed. Cryotherapy Text: The wound bed was treated with cryotherapy after the biopsy was performed. Electrodesiccation Text: The wound bed was treated with electrodesiccation after the biopsy was performed. Electrodesiccation And Curettage Text: The wound bed was treated with electrodesiccation and curettage after the biopsy was performed. Silver Nitrate Text: The wound bed was treated with silver nitrate after the biopsy was performed. Lab: [id number] Lab Facility: [id number] Consent: Written consent was obtained and risks were reviewed including but not limited to scarring, infection, bleeding, scabbing, incomplete removal, nerve damage and allergy to anesthesia. Post-Care Instructions: I reviewed with the patient in detail post-care instructions. Patient is to keep the biopsy site dry overnight, and then apply bacitracin twice daily until healed. Patient may apply hydrogen peroxide soaks to remove any crusting. Notification Instructions: Patient will be notified of biopsy results. However, patient instructed to call the office if not contacted within 2 weeks. Billing Type: Third-Party Bill Information: Selecting Yes will display possible errors in your note based on the variables you have selected. This validation is only offered as a suggestion for you. PLEASE NOTE THAT THE VALIDATION TEXT WILL BE REMOVED WHEN YOU FINALIZE YOUR NOTE. IF YOU WANT TO FAX A PRELIMINARY NOTE YOU WILL NEED TO TOGGLE THIS TO 'NO' IF YOU DO NOT WANT IT IN YOUR FAXED NOTE. Lab: [id number] Lab Facility: [id number] Billing Type: Third-Party Bill

## 2023-11-21 ENCOUNTER — PATIENT MESSAGE (OUTPATIENT)
Dept: FAMILY MEDICINE | Facility: CLINIC | Age: 33
End: 2023-11-21

## 2023-11-27 ENCOUNTER — OFFICE VISIT (OUTPATIENT)
Dept: FAMILY MEDICINE | Facility: CLINIC | Age: 33
End: 2023-11-27
Payer: COMMERCIAL

## 2023-11-27 ENCOUNTER — TELEPHONE (OUTPATIENT)
Dept: FAMILY MEDICINE | Facility: CLINIC | Age: 33
End: 2023-11-27

## 2023-11-27 VITALS
DIASTOLIC BLOOD PRESSURE: 82 MMHG | WEIGHT: 228 LBS | SYSTOLIC BLOOD PRESSURE: 120 MMHG | HEART RATE: 88 BPM | OXYGEN SATURATION: 98 % | BODY MASS INDEX: 35.79 KG/M2 | HEIGHT: 67 IN

## 2023-11-27 DIAGNOSIS — F43.21 GRIEF: ICD-10-CM

## 2023-11-27 DIAGNOSIS — J31.0 CHRONIC RHINITIS: ICD-10-CM

## 2023-11-27 DIAGNOSIS — E28.2 PCOS (POLYCYSTIC OVARIAN SYNDROME): ICD-10-CM

## 2023-11-27 DIAGNOSIS — B99.9 RECURRENT INFECTIONS: ICD-10-CM

## 2023-11-27 DIAGNOSIS — F41.9 ANXIETY: ICD-10-CM

## 2023-11-27 DIAGNOSIS — F33.42 RECURRENT MAJOR DEPRESSIVE DISORDER, IN FULL REMISSION: Primary | ICD-10-CM

## 2023-11-27 DIAGNOSIS — G47.00 INSOMNIA, UNSPECIFIED TYPE: ICD-10-CM

## 2023-11-27 DIAGNOSIS — F98.8 ATTENTION DEFICIT DISORDER, UNSPECIFIED HYPERACTIVITY PRESENCE: ICD-10-CM

## 2023-11-27 PROCEDURE — 3008F PR BODY MASS INDEX (BMI) DOCUMENTED: ICD-10-PCS | Mod: CPTII,S$GLB,, | Performed by: NURSE PRACTITIONER

## 2023-11-27 PROCEDURE — 1160F RVW MEDS BY RX/DR IN RCRD: CPT | Mod: CPTII,S$GLB,, | Performed by: NURSE PRACTITIONER

## 2023-11-27 PROCEDURE — 1160F PR REVIEW ALL MEDS BY PRESCRIBER/CLIN PHARMACIST DOCUMENTED: ICD-10-PCS | Mod: CPTII,S$GLB,, | Performed by: NURSE PRACTITIONER

## 2023-11-27 PROCEDURE — 99214 PR OFFICE/OUTPT VISIT, EST, LEVL IV, 30-39 MIN: ICD-10-PCS | Mod: S$GLB,,, | Performed by: NURSE PRACTITIONER

## 2023-11-27 PROCEDURE — 3008F BODY MASS INDEX DOCD: CPT | Mod: CPTII,S$GLB,, | Performed by: NURSE PRACTITIONER

## 2023-11-27 PROCEDURE — 3079F DIAST BP 80-89 MM HG: CPT | Mod: CPTII,S$GLB,, | Performed by: NURSE PRACTITIONER

## 2023-11-27 PROCEDURE — 1159F PR MEDICATION LIST DOCUMENTED IN MEDICAL RECORD: ICD-10-PCS | Mod: CPTII,S$GLB,, | Performed by: NURSE PRACTITIONER

## 2023-11-27 PROCEDURE — 3074F PR MOST RECENT SYSTOLIC BLOOD PRESSURE < 130 MM HG: ICD-10-PCS | Mod: CPTII,S$GLB,, | Performed by: NURSE PRACTITIONER

## 2023-11-27 PROCEDURE — 1159F MED LIST DOCD IN RCRD: CPT | Mod: CPTII,S$GLB,, | Performed by: NURSE PRACTITIONER

## 2023-11-27 PROCEDURE — 3079F PR MOST RECENT DIASTOLIC BLOOD PRESSURE 80-89 MM HG: ICD-10-PCS | Mod: CPTII,S$GLB,, | Performed by: NURSE PRACTITIONER

## 2023-11-27 PROCEDURE — 99214 OFFICE O/P EST MOD 30 MIN: CPT | Mod: S$GLB,,, | Performed by: NURSE PRACTITIONER

## 2023-11-27 PROCEDURE — 3074F SYST BP LT 130 MM HG: CPT | Mod: CPTII,S$GLB,, | Performed by: NURSE PRACTITIONER

## 2023-11-27 RX ORDER — NORETHINDRONE ACETATE AND ETHINYL ESTRADIOL 1MG-20(21)
1 KIT ORAL
COMMUNITY

## 2023-11-27 RX ORDER — ATOMOXETINE 25 MG/1
25 CAPSULE ORAL DAILY
Qty: 30 CAPSULE | Refills: 0 | Status: SHIPPED | OUTPATIENT
Start: 2023-11-27 | End: 2024-02-21

## 2023-11-27 RX ORDER — TRIAMCINOLONE ACETONIDE 1 MG/G
PASTE DENTAL
COMMUNITY
Start: 2023-08-25 | End: 2024-01-08

## 2023-11-27 RX ORDER — ZOLPIDEM TARTRATE 5 MG/1
5 TABLET ORAL NIGHTLY PRN
Qty: 30 TABLET | Refills: 0 | Status: SHIPPED | OUTPATIENT
Start: 2023-11-27 | End: 2024-05-27

## 2023-11-27 RX ORDER — LEVOCETIRIZINE DIHYDROCHLORIDE 5 MG/1
5 TABLET, FILM COATED ORAL DAILY
Qty: 30 TABLET | Refills: 2 | Status: SHIPPED | OUTPATIENT
Start: 2023-11-27 | End: 2024-01-08 | Stop reason: SDUPTHER

## 2023-11-27 NOTE — PROGRESS NOTES
SUBJECTIVE:    Patient ID: Oneida Ingram is a 33 y.o. female.    Chief Complaint: Follow-up (Bottles brought//Pt here for 6mo follow up//discuss sleep//declines flu vacc//JL)    33-year-old female presents for check up. She has a PMHx of Anxiety, MDD, and PCOS add. Mother recently passed away. Having trouble sleeping. Once falls asleep able to stay asleep. Seeing therapist. Thinks it is helping. Taking lexapro. Do think it is effective. Not currently taking adderall. Does feel like it is helpful while at work. Does not want to take stimulant right now with inability to sleep        No visits with results within 6 Month(s) from this visit.   Latest known visit with results is:   Telephone on 03/08/2023   Component Date Value Ref Range Status    WBC 03/13/2023 7.4  3.8 - 10.8 Thousand/uL Final    RBC 03/13/2023 5.21 (H)  3.80 - 5.10 Million/uL Final    Hemoglobin 03/13/2023 15.0  11.7 - 15.5 g/dL Final    Hematocrit 03/13/2023 46.3 (H)  35.0 - 45.0 % Final    MCV 03/13/2023 88.9  80.0 - 100.0 fL Final    MCH 03/13/2023 28.8  27.0 - 33.0 pg Final    MCHC 03/13/2023 32.4  32.0 - 36.0 g/dL Final    RDW 03/13/2023 12.6  11.0 - 15.0 % Final    Platelets 03/13/2023 351  140 - 400 Thousand/uL Final    MPV 03/13/2023 11.6  7.5 - 12.5 fL Final    Neutrophils, Abs 03/13/2023 4,203  1,500 - 7,800 cells/uL Final    Lymph # 03/13/2023 2,701  850 - 3,900 cells/uL Final    Mono # 03/13/2023 385  200 - 950 cells/uL Final    Eos # 03/13/2023 74  15 - 500 cells/uL Final    Baso # 03/13/2023 37  0 - 200 cells/uL Final    Neutrophils Relative 03/13/2023 56.8  % Final    Lymph % 03/13/2023 36.5  % Final    Mono % 03/13/2023 5.2  % Final    Eosinophil % 03/13/2023 1.0  % Final    Basophil % 03/13/2023 0.5  % Final    Glucose 03/13/2023 82  65 - 99 mg/dL Final    BUN 03/13/2023 15  7 - 25 mg/dL Final    Creatinine 03/13/2023 0.60  0.50 - 0.97 mg/dL Final    eGFR 03/13/2023 123  > OR = 60 mL/min/1.73m2 Final    BUN/Creatinine Ratio  03/13/2023 NOT APPLICABLE  6 - 22 (calc) Final    Sodium 03/13/2023 140  135 - 146 mmol/L Final    Potassium 03/13/2023 4.2  3.5 - 5.3 mmol/L Final    Chloride 03/13/2023 106  98 - 110 mmol/L Final    CO2 03/13/2023 23  20 - 32 mmol/L Final    Calcium 03/13/2023 9.5  8.6 - 10.2 mg/dL Final    Total Protein 03/13/2023 7.1  6.1 - 8.1 g/dL Final    Albumin 03/13/2023 4.2  3.6 - 5.1 g/dL Final    Globulin, Total 03/13/2023 2.9  1.9 - 3.7 g/dL (calc) Final    Albumin/Globulin Ratio 03/13/2023 1.4  1.0 - 2.5 (calc) Final    Total Bilirubin 03/13/2023 0.6  0.2 - 1.2 mg/dL Final    Alkaline Phosphatase 03/13/2023 53  31 - 125 U/L Final    AST 03/13/2023 34 (H)  10 - 30 U/L Final    ALT 03/13/2023 43 (H)  6 - 29 U/L Final    Cholesterol 03/13/2023 185  <200 mg/dL Final    HDL 03/13/2023 39 (L)  > OR = 50 mg/dL Final    Triglycerides 03/13/2023 181 (H)  <150 mg/dL Final    LDL Cholesterol 03/13/2023 116 (H)  mg/dL (calc) Final    HDL/Cholesterol Ratio 03/13/2023 4.7  <5.0 (calc) Final    Non HDL Chol. (LDL+VLDL) 03/13/2023 146 (H)  <130 mg/dL (calc) Final    TSH w/reflex to FT4 03/13/2023 1.55  mIU/L Final    Color, UA 03/13/2023 DARK YELLOW  YELLOW Final    Appearance, UA 03/13/2023 CLEAR  CLEAR Final    Specific Gravity, UA 03/13/2023 1.027  1.001 - 1.035 Final    pH, UA 03/13/2023 5.5  5.0 - 8.0 Final    Glucose, UA 03/13/2023 NEGATIVE  NEGATIVE Final    Bilirubin, UA 03/13/2023 NEGATIVE  NEGATIVE Final    Ketones, UA 03/13/2023 NEGATIVE  NEGATIVE Final    Occult Blood UA 03/13/2023 NEGATIVE  NEGATIVE Final    Protein, UA 03/13/2023 NEGATIVE  NEGATIVE Final    Nitrite, UA 03/13/2023 NEGATIVE  NEGATIVE Final    Leukocytes, UA 03/13/2023 NEGATIVE  NEGATIVE Final    WBC Casts, UA 03/13/2023 NONE SEEN  < OR = 5 /HPF Final    RBC Casts, UA 03/13/2023 NONE SEEN  < OR = 2 /HPF Final    Squam Epithel, UA 03/13/2023 0-5  < OR = 5 /HPF Final    Bacteria, UA 03/13/2023 NONE SEEN  NONE SEEN /HPF Final    Hyaline Casts, UA 03/13/2023  NONE SEEN  NONE SEEN /LPF Final    Service Cmt: 03/13/2023    Final    Reflexive Urine Culture 03/13/2023    Final       Past Medical History:   Diagnosis Date    Anxiety     MRSA pneumonia     Pneumonia      Social History     Socioeconomic History    Marital status:    Tobacco Use    Smoking status: Never    Smokeless tobacco: Never   Substance and Sexual Activity    Alcohol use: Yes     Comment: socially    Drug use: No    Sexual activity: Yes     Partners: Male     Birth control/protection: None   Social History Narrative    Plans from [facility]         GYN Visit & History 10 [facility]1/7/2019     Obesity    PCOS        Visit Summary:    Down 5 pounds    UDS/ reviewed    Reviewed labs with patient    Discussed diet and exercise        Prescriptions:    SIG: metformin 500 mg oral tablet, 30 days, Dispense #120 Tablet, 5 Refills    Directions: take once daily for 2 weeks then increase to BID for 2 weeks, then increase to 2 po BID    SIG: phentermine 37.5 mg oral tablet, 30 days, Dispense #30 Tablet, 0 Refills    Directions: Take 1 oral tablet once a day        Plan:    Return for follow up appointment in 1 month        Nancy Saravia NP     GYN Visit & History 10 [facility]0/2/2019     Obesity    Irregular menses    hx of ovarian cysts        Visit Summary:    PCOS panel 1 week prior to return appointment    UDS/ reviewed    Discussed diet and exercise at length        Prescriptions:    SIG: phentermine 37.5 mg oral tablet, 30 days, Dispense #30 Tablet, 0 Refills    Directions: Take 1 oral tablet once a day    SIG: Prenatal 28 mg iron- 800 mcg oral tablet, 30 days, Dispense #30 Tablet, 0 Refills    Directions: Take 1 oral tablet once a day        Plan:    Return for follow up appointment in1 month    Weight loss goal set for 8-10 pounds        Nancy Saravia NP     GYN Exam (Annual/6Wk PP)10 [facility]2/13/2016     Annual    Depression/Anxiety    Obesity    Break-through bleeding with OCP        Visit Summary     Ordered:    Blood Drawing    Pap IG, Ct-Ng, rfx HPV ASCU    CBC With Differential/Platelet    Glucose, Serum    Lipid Panel    UA w/o Micro: within normal limits    UPT: negative        Prescriptions:    SIG: NuvaRing 0.12-0.015 mg/24 hr ring,  days, Dispense #1 Ring, 3 Refills    Directions: one ring vaginally x 21 days, then remove and discard. Leave out for 7 days, then insert new ring.    Zoloft working for depression management.    Pt has a PCP for depression management, discussed counseling, exercise etc.        Return for follow up appointment in one year or prn.     GYN Visit & [id number] [facility]2016     [facility] k pneumonia. w same sensitivies as k pneumonia uti in .........gb us nl...renal us nl....co persisatnt pp...and frequency and urgency and nocgturia.....modeerat...x 1 y        a-relapsing k npuemonia uti,,..diarrhea x 1 mo            p-cipro 500 mg bid x 10 d...rtc 3 w for repaeat [facility] and if still [prese nt will refer to dr mccallum......stool for ova and parasite and c deficil     GYN Visit & [id number] [facility]2016     25 yo........daily cramping in pelvis ...moderate....intermittendt and daily....duration 1 h....not worse w activity...u/s nl...on ocp.....associated w dysmen...requires advil and improves w nsaia....no dysp...no dysuria....frequency and urgency..x 1 yr, not worsening, but w apin associated with right cvat....h/o kleb uti ......sedc rate 14 on ....bm 2-4x/d, x 2 mos....sp cs...appy....ho mrsa pneumonia w intubation and blood transfusion 2 yrs....        a-pelvic p[ain, dysmenorrhea, urinary frequency and urgency....poss renal etiology...r/o gb and renal dis        p-renal and gb us...ucs.....rtc 1 wk.....consider dx lap     GYN Visit & History2016     Pelvic pain: intermittent    Contraceptive counseling    urinary tract infection        Plan: Finish Keflex.    Pt did not take doxy as prescribed.    Recommended to take the full 10 days.    Reviewed u/s and labs  with patient.    Track pain, continue OCP.    Return for follow up appointment in 2 months for follow up with Dr Horan or sooner if needed.    Terazol esent if needed for yeast r/t prolonged antibiotic use.     GYN Visit & History2016     25 yo........pp x 2mos.....ocp 2-3 wk....cramping w sharp exacerbagtion...right great than left...no n/v...post coital pelvic pain....fever x 1 d...pos appy 10 yr ago        a-pelvic pain....vag dc....        p-gc chl affirm ucs cbc sed rate...doxy 100 bid x 10...us....rtc 1 w     GYN Exam (Annual/6Wk PP)     Annual    Obesity        Plan: Pap with gc/ct    Adipex refilled    Zyrtec refilled.    Diet and exercise discussed.    Return for follow up appointment one year or prn.     GYN Visit & F/     Endometritis, resolved.  Obesity.  Family history of breast cancer.    Bilateral breast ultrasound at [facility].    Prescription for Contrave.    Return to clinic in one month.     GYN Exam (Annual/6Wk PP)     Annual exam.  Contraceptive management.  Strong family history of breast cancer.  Right breast cyst.  Endometritis.    CBC and sed.  Rate today.    Change birth control to Mircette 1 by mouth daily.    Doxycycline and Flagyl x2 weeks.    Ultrasound with radiology of bilateral breasts.    BRCA gene testing.     GYN Visit & F/     Irregular menstrual cycle        Plan: Beta today    If starts menses, restart OCP.    Condom use.    Return for follow up appointment prn pending results.     GYN Visit & F/     Yeast vulvovaginitis    Recent MRSA pneumonia, S/P 3 week ICU stay        Plan: Diflucan 150mg daily #1    Mycolog ointment esent    Continue PT and follow up with PCP as scheduled    Return for follow up appointment prn.     GYN Visit & F/     Bacterial Vaginosis        Plan: Flagyl 500mg I PO BID, pt states she does have rx at home    Repeat diflucan.    Pt has no insurance at this time.         Return for follow up appointment prn.     GYN Visit & [id number]     co uri...on tricycle....        o- heent adenopathy.....cx clean        a-sp luz maria 1 ..uri        p-pap...amp 500 tid.....rtc 6 mo p/p....     GYN Visit & F/     cx bx hpv w/o dysplasia.....no tob...rtc 6 mos pap...Gardasil at hd     DAY GYN Visit + History2013     colpo-prob luz maria 2.....bx at 600/900.........rtc 1 wk     Past Surgical History:   Procedure Laterality Date    APPENDECTOMY      BRONCHOSCOPY       SECTION, LOW TRANSVERSE      DILATION AND CURETTAGE OF UTERUS N/A 2020    Procedure: DILATION AND CURETTAGE, UTERUS;  Surgeon: Srinivasan Stewart MD;  Location: [facility] OR;  Service: OB/GYN;  Laterality: N/A;    HYSTEROSCOPIC POLYPECTOMY OF UTERUS N/A 2020    Procedure: POLYPECTOMY, UTERUS, HYSTEROSCOPIC;  Surgeon: Srinivasan Stewart MD;  Location: [facility] OR;  Service: OB/GYN;  Laterality: N/A;    HYSTEROSCOPY N/A 2020    Procedure: HYSTEROSCOPY;  Surgeon: Srinivasan Stewart MD;  Location: [facility] OR;  Service: OB/GYN;  Laterality: N/A;    TONSILLECTOMY       Family History   Problem Relation Age of Onset    COPD Mother     Asthma Mother     Heart disease Mother     Heart disease Brother     Heart disease Maternal Grandfather     Diabetes Paternal Grandmother     Heart disease Paternal Grandmother     Breast cancer Paternal Aunt     Breast cancer Paternal Aunt     Breast cancer Paternal Cousin     Immunodeficiency Neg Hx     Eczema Neg Hx     Rhinitis Neg Hx        All of your core healthy metrics are met.      Review of patient's allergies indicates:   Allergen Reactions    Azithromycin Hives    Bactrim [sulfamethoxazole-trimethoprim]      Rash     Ciprofloxacin      Doesn't remember ? Rash     Clindamycin Hives    Doxycycline Hives    Naltrexone-bupropion Nausea And Vomiting and Other (See Comments)     hands and face numbness    Penicillins Rash     Tiny raised bumps. Not urticarial- 10 years.        Current  Outpatient Medications:     clonazePAM (KLONOPIN) 0.5 MG tablet, Take 1 tablet (0.5 mg total) by mouth 2 (two) times daily as needed for Anxiety., Disp: 60 tablet, Rfl: 5    EScitalopram oxalate (LEXAPRO) 10 MG tablet, Take 1 tablet (10 mg total) by mouth once daily., Disp: 90 tablet, Rfl: 3    fluticasone propionate (FLONASE) 50 mcg/actuation nasal spray, 2 sprays (100 mcg total) by Each Nostril route once daily., Disp: 18.2 mL, Rfl: 2    metoprolol succinate (TOPROL-XL) 25 MG 24 hr tablet, Take 0.5 tablets (12.5 mg total) by mouth once daily., Disp: 45 tablet, Rfl: 5    norethindrone-ethinyl estradiol (JUNEL FE 1/20) 1 mg-20 mcg (21)/75 mg (7) per tablet, Take 1 tablet by mouth., Disp: , Rfl:     triamcinolone acetonide 0.1% (KENALOG) 0.1 % paste, 5 (five) times daily., Disp: , Rfl:     vitamin D (VITAMIN D3) 1000 units Tab, Take 1,000 Units by mouth once daily., Disp: , Rfl:     ascorbic acid, vitamin C, (VITAMIN C) 1000 MG tablet, Take 1,000 mg by mouth., Disp: , Rfl:     aspirin (ECOTRIN) 81 MG EC tablet, Take 81 mg by mouth once daily., Disp: , Rfl:     atomoxetine (STRATTERA) 25 MG capsule, Take 1 capsule (25 mg total) by mouth once daily., Disp: 30 capsule, Rfl: 0    cinnamon bark 500 mg capsule, Take 500 mg by mouth once daily., Disp: , Rfl:     levocetirizine (XYZAL) 5 MG tablet, Take 1 tablet (5 mg total) by mouth once daily., Disp: 30 tablet, Rfl: 2    norgestimate-ethinyl estradioL (ORTHO TRI-CYCLEN LO) 0.18/0.215/0.25 mg-25 mcg tablet, Take 1 tablet by mouth once daily., Disp: , Rfl:     UNABLE TO FIND, Take 1 tablet by mouth once daily. Methyl-balance, Disp: , Rfl:     UNABLE TO FIND, Take 4 capsules by mouth once daily. emery & Dchiro inositol, Disp: , Rfl:     UNABLE TO FIND, Take 2 capsules by mouth once daily. Calm  Sleep capsules, Disp: , Rfl:     vitamin E 400 UNIT capsule, Take 400 Units by mouth once daily., Disp: , Rfl:     zinc gluconate 50 mg tablet, Take 50 mg by mouth., Disp: , Rfl:      "zolpidem (AMBIEN) 5 MG Tab, Take 1 tablet (5 mg total) by mouth nightly as needed., Disp: 30 tablet, Rfl: 0    Review of Systems   Constitutional:  Negative for chills, fever and unexpected weight change.   HENT:  Negative for ear pain, rhinorrhea and sore throat.    Eyes:  Negative for pain and visual disturbance.   Respiratory:  Negative for cough and shortness of breath.    Cardiovascular:  Negative for chest pain, palpitations and leg swelling.   Gastrointestinal:  Negative for abdominal pain, diarrhea, nausea and vomiting.   Genitourinary:  Negative for difficulty urinating, hematuria and vaginal bleeding.   Musculoskeletal:  Negative for arthralgias.   Skin:  Negative for rash.   Neurological:  Negative for dizziness, weakness and headaches.   Psychiatric/Behavioral:  Negative for agitation and sleep disturbance. The patient is not nervous/anxious.           Objective:      Vitals:    11/27/23 1106   BP: 120/82   Pulse: 88   SpO2: 98%   Weight: 103.4 kg (228 lb)   Height: 5' 6.5" (1.689 m)     Physical Exam  Vitals and nursing note reviewed.   Constitutional:       General: She is not in acute distress.     Appearance: Normal appearance. She is well-developed. She is obese.   HENT:      Head: Normocephalic.   Eyes:      Conjunctiva/sclera: Conjunctivae normal.      Pupils: Pupils are equal, round, and reactive to light.   Neck:      Vascular: No JVD.   Cardiovascular:      Rate and Rhythm: Normal rate and regular rhythm.      Heart sounds: No murmur heard.  Pulmonary:      Effort: Pulmonary effort is normal.      Breath sounds: Normal breath sounds.   Abdominal:      General: Bowel sounds are normal.      Palpations: Abdomen is soft.   Musculoskeletal:         General: No deformity. Normal range of motion.      Cervical back: Normal range of motion and neck supple.   Lymphadenopathy:      Cervical: No cervical adenopathy.   Skin:     General: Skin is warm and dry.      Findings: No rash.   Neurological:      " Mental Status: She is alert and oriented to person, place, and time.      Gait: Gait normal.   Psychiatric:         Speech: Speech normal.         Behavior: Behavior normal.           Assessment:       1. Recurrent major depressive disorder, in full remission    2. Anxiety    3. Grief    4. PCOS (polycystic ovarian syndrome)    5. Recurrent infections    6. Attention deficit disorder, unspecified hyperactivity presence    7. Insomnia, unspecified type    8. Chronic rhinitis         Plan:       Recurrent major depressive disorder, in full remission  Comments:  lexapro    Anxiety  Comments:  klonopin prn    Grief  Comments:  continue therapy    PCOS (polycystic ovarian syndrome)    Recurrent infections    Attention deficit disorder, unspecified hyperactivity presence  Comments:  try straterra once sleeping better    Insomnia, unspecified type  Comments:  try ambien    Chronic rhinitis  Comments:  xyzal    Other orders  -     levocetirizine (XYZAL) 5 MG tablet; Take 1 tablet (5 mg total) by mouth once daily.  Dispense: 30 tablet; Refill: 2  -     zolpidem (AMBIEN) 5 MG Tab; Take 1 tablet (5 mg total) by mouth nightly as needed.  Dispense: 30 tablet; Refill: 0  -     atomoxetine (STRATTERA) 25 MG capsule; Take 1 capsule (25 mg total) by mouth once daily.  Dispense: 30 capsule; Refill: 0      Follow up in about 6 weeks (around 1/8/2024) for medication management.        11/29/2023 Peace Gray

## 2023-11-27 NOTE — TELEPHONE ENCOUNTER
----- Message from Allie Mack sent at 11/27/2023  2:40 PM CST -----  Pt was in today and her sleeping medicine is not at the pharmacy. She is there now  [facility] [place]  [phone]

## 2023-12-21 ENCOUNTER — PATIENT MESSAGE (OUTPATIENT)
Dept: FAMILY MEDICINE | Facility: CLINIC | Age: 33
End: 2023-12-21
Payer: COMMERCIAL

## 2023-12-22 RX ORDER — ZOLPIDEM TARTRATE 10 MG/1
10 TABLET ORAL NIGHTLY PRN
Qty: 30 TABLET | Refills: 2 | Status: SHIPPED | OUTPATIENT
Start: 2023-12-22 | End: 2024-03-21

## 2023-12-27 LAB — CRC RECOMMENDATION EXT: NORMAL

## 2023-12-29 ENCOUNTER — PATIENT OUTREACH (OUTPATIENT)
Dept: ADMINISTRATIVE | Facility: HOSPITAL | Age: 33
End: 2023-12-29
Payer: COMMERCIAL

## 2023-12-29 NOTE — PROGRESS NOTES
Population Health Chart Review & Patient Outreach Details    Outreach Performed: NO    Additional Pop Health Notes:           Updates Requested / Reviewed:      Updated Care Coordination Note, Care Everywhere, External Sources: [facility], Care Team Updated, and Other    PAP SMEAR AND HPV PULLED FROM [facility]         Health Maintenance Topics Overdue:    Health Maintenance Due   Topic Date Due    Hepatitis C Screening  Never done    TETANUS VACCINE  Never done    Influenza Vaccine (1) 09/01/2023    COVID-19 Vaccine (3 - 2023-24 season) 09/01/2023         Health Maintenance Topic(s) Outreach Outcomes & Actions Taken:    Colorectal Cancer Screening - Outreach Outcomes & Actions Taken  : External Records Uploaded, Care Team Updated, & History Updated if Applicable        Cervical Cancer Screening - Outreach Outcomes & Actions Taken  : External Records Uploaded & Care Team Updated if Applicable and HPV HYPERLINKED

## 2024-01-08 ENCOUNTER — OFFICE VISIT (OUTPATIENT)
Dept: FAMILY MEDICINE | Facility: CLINIC | Age: 34
End: 2024-01-08
Payer: COMMERCIAL

## 2024-01-08 VITALS
HEIGHT: 68 IN | WEIGHT: 227 LBS | DIASTOLIC BLOOD PRESSURE: 78 MMHG | HEART RATE: 77 BPM | OXYGEN SATURATION: 98 % | SYSTOLIC BLOOD PRESSURE: 122 MMHG | BODY MASS INDEX: 34.4 KG/M2

## 2024-01-08 DIAGNOSIS — F41.9 ANXIETY: ICD-10-CM

## 2024-01-08 DIAGNOSIS — Z87.01 HISTORY OF PNEUMONIA: Primary | ICD-10-CM

## 2024-01-08 DIAGNOSIS — L30.9 DERMATITIS: ICD-10-CM

## 2024-01-08 DIAGNOSIS — Z79.899 HIGH RISK MEDICATION USE: ICD-10-CM

## 2024-01-08 DIAGNOSIS — F33.42 RECURRENT MAJOR DEPRESSIVE DISORDER, IN FULL REMISSION: ICD-10-CM

## 2024-01-08 DIAGNOSIS — G47.00 INSOMNIA, UNSPECIFIED TYPE: ICD-10-CM

## 2024-01-08 DIAGNOSIS — Z00.00 PHYSICAL EXAM: ICD-10-CM

## 2024-01-08 DIAGNOSIS — M25.561 ACUTE PAIN OF RIGHT KNEE: ICD-10-CM

## 2024-01-08 PROCEDURE — 1159F MED LIST DOCD IN RCRD: CPT | Mod: CPTII,S$GLB,, | Performed by: NURSE PRACTITIONER

## 2024-01-08 PROCEDURE — 99214 OFFICE O/P EST MOD 30 MIN: CPT | Mod: S$GLB,,, | Performed by: NURSE PRACTITIONER

## 2024-01-08 PROCEDURE — 3074F SYST BP LT 130 MM HG: CPT | Mod: CPTII,S$GLB,, | Performed by: NURSE PRACTITIONER

## 2024-01-08 PROCEDURE — 1160F RVW MEDS BY RX/DR IN RCRD: CPT | Mod: CPTII,S$GLB,, | Performed by: NURSE PRACTITIONER

## 2024-01-08 PROCEDURE — 3078F DIAST BP <80 MM HG: CPT | Mod: CPTII,S$GLB,, | Performed by: NURSE PRACTITIONER

## 2024-01-08 PROCEDURE — 3008F BODY MASS INDEX DOCD: CPT | Mod: CPTII,S$GLB,, | Performed by: NURSE PRACTITIONER

## 2024-01-08 RX ORDER — TRIAMCINOLONE ACETONIDE 5 MG/G
CREAM TOPICAL 3 TIMES DAILY
Qty: 454 G | Refills: 0 | Status: SHIPPED | OUTPATIENT
Start: 2024-01-08 | End: 2024-02-21

## 2024-01-08 RX ORDER — PREDNISONE 5 MG/1
5 TABLET ORAL 2 TIMES DAILY
Qty: 10 TABLET | Refills: 0 | Status: SHIPPED | OUTPATIENT
Start: 2024-01-08 | End: 2024-02-21

## 2024-01-08 RX ORDER — LEVOCETIRIZINE DIHYDROCHLORIDE 5 MG/1
5 TABLET, FILM COATED ORAL DAILY
Qty: 30 TABLET | Refills: 2 | Status: SHIPPED | OUTPATIENT
Start: 2024-01-08

## 2024-01-09 NOTE — PROGRESS NOTES
SUBJECTIVE:    Patient ID: Oneida Ingram is a 33 y.o. female.    Chief Complaint: Follow-up (Bottles brought//Pt here for 6 week f/u//discuss rash on sides of adbomen//declines flu vacc//JL)    33-year-old female presents for follow up. She has a PMHx of Anxiety, MDD, and PCOS add. Was seen in our office about 4 weeks ago. Started on ambien due to inability to sleep.this has helped most nights. Mother recently passed away. Still seeing therapist. Still taking lexapro which is helping. Diagnosed with covid last visit . Symptoms are improving.   Has rash. Denies any new soaps detergents etc. Itching is worse at night.   Has spot on knee that would like looked at. No pain. No injury        Patient Outreach on 12/29/2023   Component Date Value Ref Range Status    CRC Recommendation External 12/27/2023 No follow-up frequency specified   Final    PAP Recommendation External 06/12/2023 Cotesting in 5 years   Final    Pap 06/12/2023 Epithelial cell abnormality (A)  Negative for intraephithelial lesion or malignancy, Other Final    HPV DNA 06/12/2023 None Detected  None Detected Final       Past Medical History:   Diagnosis Date    Anxiety     MRSA pneumonia     Pneumonia      Social History     Socioeconomic History    Marital status:    Tobacco Use    Smoking status: Never    Smokeless tobacco: Never   Substance and Sexual Activity    Alcohol use: Yes     Comment: socially    Drug use: No    Sexual activity: Yes     Partners: Male     Birth control/protection: None   Social History Narrative    Pl[facility] from [facility]         [facility]     Obesity    PCOS        Visit Summary:    Down 5 pounds    UDS/ reviewed    Reviewed labs with patient    Discussed diet and exercise        Prescriptions:    SIG: metformin 500 mg oral tablet, 30 days, Dispense #120 Tablet, 5 Refills    Directions: take once daily for 2 weeks then increase to BID for 2 weeks, then increase to 2 po BID    SIG:  phentermine 37.5 mg oral tablet, 30 days, Dispense #30 Tablet, 0 Refills    Directions: Take 1 oral tablet once a day        Plan:    Return for follow up appointment in 1 month        Nancy Saravia NP     GYN Visit & History 10 [facility]0/2/2019     Obesity    Irregular menses    hx of ovarian cysts        Visit Summary:    PCOS panel 1 week prior to return appointment    UDS/ reviewed    Discussed diet and exercise at length        Prescriptions:    SIG: phentermine 37.5 mg oral tablet, 30 days, Dispense #30 Tablet, 0 Refills    Directions: Take 1 oral tablet once a day    SIG: Prenatal 28 mg iron- 800 mcg oral tablet, 30 days, Dispense #30 Tablet, 0 Refills    Directions: Take 1 oral tablet once a day        Plan:    Return for follow up appointment in1 month    Weight loss goal set for 8-10 pounds        Nancy Saravia NP     GYN Exam (Annual/6Wk PP)[facility]2/13/2016     Annual    Depression/Anxiety    Obesity    Break-through bleeding with OCP        Visit Summary    Ordered:    Blood Drawing    Pap IG, Ct-Ng, rfx HPV ASCU    CBC With Differential/Platelet    Glucose, Serum    Lipid Panel    UA w/o Micro: within normal limits    UPT: negative        Prescriptions:    SIG: NuvaRing 0.12-0.015 mg/24 hr ring,  days, Dispense #1 Ring, 3 Refills    Directions: one ring vaginally x 21 days, then remove and discard. Leave out for 7 days, then insert new ring.    Zoloft working for depression management.    Pt has a PCP for depression management, discussed counseling, exercise etc.        Return for follow up appointment in one year or prn.     GYN Visit & [facility]     ucs k pneumonia. w same sensitivies as k pneumonia uti in 5/6.........gb us nl...renal us nl....co persisatnt pp...and frequency and urgency and nocgturia.....modeerat...x 1 y        a-relapsing k npuemonia uti,,..diarrhea x 1 mo            p-cipro 500 mg bid x 10 d...rtc 3 w for repaeat ucs and if still [prese nt will refer to   [facility]......stool for ova and parasite and c deficil     [facility]     27 yo........daily cramping in pelvis ...moderate....intermittendt and daily....duration 1 h....not worse w activity...u/s nl...on ocp.....associated w dysmen...requires advil and improves w nsaia....no dysp...no dysuria....frequency and urgency..x 1 yr, not worsening, but w apin associated with right cvat....h/o kleb uti ......sedc rate 14 on ....bm 2-4x/d, x 2 mos....sp cs...appy....ho mrsa pneumonia w intubation and blood transfusion 2 yrs....        a-pelvic p[ain, dysmenorrhea, urinary frequency and urgency....poss renal etiology...r/o gb and renal dis        p-renal and gb us...ucs.....rtc 1 wk.....consider dx lap     GYN Visit & History2016     Pelvic pain: intermittent    Contraceptive counseling    urinary tract infection        Plan: Finish Keflex.    Pt did not take doxy as prescribed.    Recommended to take the full 10 days.    Reviewed u/s and labs with patient.    Track pain, continue OCP.    Return for follow up appointment in 2 months for follow up with Dr Horan or sooner if needed.    Terazol esent if needed for yeast r/t prolonged antibiotic use.     GYN Visit & History2016     27 yo........pp x 2mos.....ocp 2-3 wk....cramping w sharp exacerbagtion...right great than left...no n/v...post coital pelvic pain....fever x 1 d...pos appy 10 yr ago        a-pelvic pain....vag dc....        p-gc chl affirm ucs cbc sed rate...doxy 100 bid x 10...us....rtc 1 w     GYN Exam (Annual/6Wk PP)     Annual    Obesity        Plan: Pap with gc/ct    Adipex refilled    Zyrtec refilled.    Diet and exercise discussed.    Return for follow up appointment one year or prn.     GYN Visit & F/     Endometritis, resolved.  Obesity.  Family history of breast cancer.    Bilateral breast ultrasound at [facility].    Prescription for Contrave.    Return to clinic in one  month.     GYN Exam (Annual/6Wk PP)     Annual exam.  Contraceptive management.  Strong family history of breast cancer.  Right breast cyst.  Endometritis.    CBC and sed.  Rate today.    Change birth control to Mircette 1 by mouth daily.    Doxycycline and Flagyl x2 weeks.    Ultrasound with radiology of bilateral breasts.    BRCA gene testing.     GYN Visit & F/     Irregular menstrual cycle        Plan: Beta today    If starts menses, restart OCP.    Condom use.    Return for follow up appointment prn pending results.     GYN Visit & F/     Yeast vulvovaginitis    Recent MRSA pneumonia, S/P 3 week ICU stay        Plan: Diflucan 150mg daily #1    Mycolog ointment esent    Continue PT and follow up with PCP as scheduled    Return for follow up appointment prn.     GYN Visit & F/     Bacterial Vaginosis        Plan: Flagyl 500mg I PO BID, pt states she does have rx at home    Repeat diflucan.    Pt has no insurance at this time.        Return for follow up appointment prn.     GYN Visit & [id number]     co uri...on tricycle....        o- heent adenopathy.....cx clean        a-sp luz maria 1 ..uri        p-pap...amp 500 tid.....rtc 6 mo p/p....     GYN Visit & F/     cx bx hpv w/o dysplasia.....no tob...rtc 6 mos pap...Gardasil at hd     DAY GYN Visit + History2013     colpo-prob luz maria 2.....bx at 600/900.........rtc 1 wk     Past Surgical History:   Procedure Laterality Date    APPENDECTOMY      BRONCHOSCOPY       SECTION, LOW TRANSVERSE      DILATION AND CURETTAGE OF UTERUS N/A 2020    Procedure: DILATION AND CURETTAGE, UTERUS;  Surgeon: Srinivasan Stewart MD;  Location: [facility] OR;  Service: OB/GYN;  Laterality: N/A;    HYSTEROSCOPIC POLYPECTOMY OF UTERUS N/A 2020    Procedure: POLYPECTOMY, UTERUS, HYSTEROSCOPIC;  Surgeon: Srinivasan Stewart MD;  Location: [facility] OR;  Service: OB/GYN;  Laterality: N/A;    HYSTEROSCOPY N/A 2020    Procedure:  HYSTEROSCOPY;  Surgeon: Srinivasan Stewart MD;  Location: [facility] OR;  Service: OB/GYN;  Laterality: N/A;    TONSILLECTOMY       Family History   Problem Relation Age of Onset    COPD Mother     Asthma Mother     Heart disease Mother     Heart disease Brother     Heart disease Maternal Grandfather     Diabetes Paternal Grandmother     Heart disease Paternal Grandmother     Breast cancer Paternal Aunt     Breast cancer Paternal Aunt     Breast cancer Paternal Cousin     Immunodeficiency Neg Hx     Eczema Neg Hx     Rhinitis Neg Hx        All of your core healthy metrics are met.      Review of patient's allergies indicates:   Allergen Reactions    Azithromycin Hives    Bactrim [sulfamethoxazole-trimethoprim]      Rash     Ciprofloxacin      Doesn't remember ? Rash     Clindamycin Hives    Doxycycline Hives    Naltrexone-bupropion Nausea And Vomiting and Other (See Comments)     hands and face numbness    Penicillins Rash     Tiny raised bumps. Not urticarial- 10 years.        Current Outpatient Medications:     clonazePAM (KLONOPIN) 0.5 MG tablet, Take 1 tablet (0.5 mg total) by mouth 2 (two) times daily as needed for Anxiety., Disp: 60 tablet, Rfl: 5    EScitalopram oxalate (LEXAPRO) 10 MG tablet, Take 1 tablet (10 mg total) by mouth once daily., Disp: 90 tablet, Rfl: 3    fluticasone propionate (FLONASE) 50 mcg/actuation nasal spray, 2 sprays (100 mcg total) by Each Nostril route once daily., Disp: 18.2 mL, Rfl: 2    metoprolol succinate (TOPROL-XL) 25 MG 24 hr tablet, Take 0.5 tablets (12.5 mg total) by mouth once daily., Disp: 45 tablet, Rfl: 5    norethindrone-ethinyl estradiol (JUNEL FE 1/20) 1 mg-20 mcg (21)/75 mg (7) per tablet, Take 1 tablet by mouth., Disp: , Rfl:     vitamin D (VITAMIN D3) 1000 units Tab, Take 1,000 Units by mouth once daily., Disp: , Rfl:     zolpidem (AMBIEN) 10 mg Tab, Take 1 tablet (10 mg total) by mouth nightly as needed., Disp: 30 tablet, Rfl: 2    ascorbic acid, vitamin C, (VITAMIN C)  1000 MG tablet, Take 1,000 mg by mouth., Disp: , Rfl:     aspirin (ECOTRIN) 81 MG EC tablet, Take 81 mg by mouth once daily., Disp: , Rfl:     atomoxetine (STRATTERA) 25 MG capsule, Take 1 capsule (25 mg total) by mouth once daily., Disp: 30 capsule, Rfl: 0    cinnamon bark 500 mg capsule, Take 500 mg by mouth once daily., Disp: , Rfl:     levocetirizine (XYZAL) 5 MG tablet, Take 1 tablet (5 mg total) by mouth once daily., Disp: 30 tablet, Rfl: 2    norgestimate-ethinyl estradioL (ORTHO TRI-CYCLEN LO) 0.18/0.215/0.25 mg-25 mcg tablet, Take 1 tablet by mouth once daily., Disp: , Rfl:     predniSONE (DELTASONE) 5 MG tablet, Take 1 tablet (5 mg total) by mouth 2 (two) times daily., Disp: 10 tablet, Rfl: 0    triamcinolone acetonide 0.5% (KENALOG) 0.5 % Crea, Apply topically 3 (three) times daily., Disp: 454 g, Rfl: 0    UNABLE TO FIND, Take 1 tablet by mouth once daily. Methyl-balance, Disp: , Rfl:     UNABLE TO FIND, Take 4 capsules by mouth once daily. emery & Dchiro inositol, Disp: , Rfl:     UNABLE TO FIND, Take 2 capsules by mouth once daily. Calm  Sleep capsules, Disp: , Rfl:     vitamin E 400 UNIT capsule, Take 400 Units by mouth once daily., Disp: , Rfl:     zinc gluconate 50 mg tablet, Take 50 mg by mouth., Disp: , Rfl:     zolpidem (AMBIEN) 5 MG Tab, Take 1 tablet (5 mg total) by mouth nightly as needed. (Patient not taking: Reported on 1/8/2024), Disp: 30 tablet, Rfl: 0    Review of Systems   Constitutional:  Negative for chills, fever and unexpected weight change.   HENT:  Negative for ear pain, rhinorrhea and sore throat.    Eyes:  Negative for pain and visual disturbance.   Respiratory:  Negative for cough and shortness of breath.    Cardiovascular:  Negative for chest pain, palpitations and leg swelling.   Gastrointestinal:  Negative for abdominal pain, diarrhea, nausea and vomiting.   Genitourinary:  Negative for difficulty urinating, hematuria and vaginal bleeding.   Musculoskeletal:  Negative for  "arthralgias.   Skin:  Positive for rash.   Neurological:  Negative for dizziness, weakness and headaches.   Psychiatric/Behavioral:  Negative for agitation and sleep disturbance. The patient is not nervous/anxious.           Objective:      Vitals:    01/08/24 1052   BP: 122/78   Pulse: 77   SpO2: 98%   Weight: 103 kg (227 lb)   Height: 5' 8" (1.727 m)     Physical Exam  Vitals and nursing note reviewed.   Constitutional:       General: She is not in acute distress.     Appearance: Normal appearance. She is well-developed. She is obese.   HENT:      Right Ear: External ear normal.      Left Ear: External ear normal.   Eyes:      Conjunctiva/sclera: Conjunctivae normal.      Pupils: Pupils are equal, round, and reactive to light.   Neck:      Vascular: No JVD.   Cardiovascular:      Rate and Rhythm: Normal rate and regular rhythm.      Heart sounds: No murmur heard.  Pulmonary:      Effort: Pulmonary effort is normal.      Breath sounds: Normal breath sounds.   Abdominal:      General: Bowel sounds are normal.      Palpations: Abdomen is soft.   Musculoskeletal:         General: No deformity. Normal range of motion.      Cervical back: Normal range of motion and neck supple.        Legs:       Comments: Bony prominence to knee. No pain with palpation   Lymphadenopathy:      Cervical: No cervical adenopathy.   Skin:     General: Skin is warm and dry.      Findings: No rash.             Comments: Fine erythama papules to both sides   Neurological:      Mental Status: She is alert and oriented to person, place, and time.      Gait: Gait normal.   Psychiatric:         Attention and Perception: Attention and perception normal.         Mood and Affect: Mood is not anxious.         Speech: Speech normal.         Behavior: Behavior normal.           Assessment:       1. History of pneumonia    2. High risk medication use    3. Recurrent major depressive disorder, in full remission    4. Anxiety    5. Physical exam    6. " Acute pain of right knee    7. Dermatitis    8. Insomnia, unspecified type         Plan:       History of pneumonia    High risk medication use    Recurrent major depressive disorder, in full remission    Anxiety  Comments:  klonopin    Physical exam    Acute pain of right knee  Comments:  xray  Orders:  -     X-Ray Knee 3 View Right; Future; Expected date: 01/08/2024    Dermatitis  Comments:  prednisone, triamcinolone    Insomnia, unspecified type  Comments:  ambien    Other orders  -     levocetirizine (XYZAL) 5 MG tablet; Take 1 tablet (5 mg total) by mouth once daily.  Dispense: 30 tablet; Refill: 2  -     triamcinolone acetonide 0.5% (KENALOG) 0.5 % Crea; Apply topically 3 (three) times daily.  Dispense: 454 g; Refill: 0  -     predniSONE (DELTASONE) 5 MG tablet; Take 1 tablet (5 mg total) by mouth 2 (two) times daily.  Dispense: 10 tablet; Refill: 0      Follow up in 6 months (on 7/8/2024), or if symptoms worsen or fail to improve, for medication management.        1/9/2024 Peace Gray

## 2024-01-10 ENCOUNTER — HOSPITAL ENCOUNTER (OUTPATIENT)
Dept: RADIOLOGY | Facility: CLINIC | Age: 34
Discharge: HOME | End: 2024-01-10
Attending: NURSE PRACTITIONER
Payer: COMMERCIAL

## 2024-01-10 DIAGNOSIS — M25.561 ACUTE PAIN OF RIGHT KNEE: ICD-10-CM

## 2024-01-10 PROCEDURE — 73562 X-RAY EXAM OF KNEE 3: CPT | Mod: 26,RT,S$GLB, | Performed by: RADIOLOGY

## 2024-01-10 PROCEDURE — 73562 X-RAY EXAM OF KNEE 3: CPT | Mod: TC,FY,PO,RT

## 2024-02-19 ENCOUNTER — PATIENT MESSAGE (OUTPATIENT)
Dept: FAMILY MEDICINE | Facility: CLINIC | Age: 34
End: 2024-02-19
Payer: COMMERCIAL

## 2024-02-21 ENCOUNTER — OFFICE VISIT (OUTPATIENT)
Dept: FAMILY MEDICINE | Facility: CLINIC | Age: 34
End: 2024-02-21
Payer: COMMERCIAL

## 2024-02-21 VITALS
SYSTOLIC BLOOD PRESSURE: 122 MMHG | OXYGEN SATURATION: 97 % | WEIGHT: 231 LBS | HEIGHT: 68 IN | HEART RATE: 84 BPM | BODY MASS INDEX: 35.01 KG/M2 | DIASTOLIC BLOOD PRESSURE: 86 MMHG

## 2024-02-21 DIAGNOSIS — F43.21 GRIEF: ICD-10-CM

## 2024-02-21 DIAGNOSIS — F41.9 ANXIETY: ICD-10-CM

## 2024-02-21 DIAGNOSIS — F33.42 RECURRENT MAJOR DEPRESSIVE DISORDER, IN FULL REMISSION: Primary | ICD-10-CM

## 2024-02-21 PROCEDURE — 3079F DIAST BP 80-89 MM HG: CPT | Mod: CPTII,S$GLB,, | Performed by: NURSE PRACTITIONER

## 2024-02-21 PROCEDURE — 1160F RVW MEDS BY RX/DR IN RCRD: CPT | Mod: CPTII,S$GLB,, | Performed by: NURSE PRACTITIONER

## 2024-02-21 PROCEDURE — 3008F BODY MASS INDEX DOCD: CPT | Mod: CPTII,S$GLB,, | Performed by: NURSE PRACTITIONER

## 2024-02-21 PROCEDURE — 3074F SYST BP LT 130 MM HG: CPT | Mod: CPTII,S$GLB,, | Performed by: NURSE PRACTITIONER

## 2024-02-21 PROCEDURE — 1159F MED LIST DOCD IN RCRD: CPT | Mod: CPTII,S$GLB,, | Performed by: NURSE PRACTITIONER

## 2024-02-21 PROCEDURE — 99214 OFFICE O/P EST MOD 30 MIN: CPT | Mod: S$GLB,,, | Performed by: NURSE PRACTITIONER

## 2024-02-21 RX ORDER — CARIPRAZINE 1.5 MG/1
1.5 CAPSULE, GELATIN COATED ORAL DAILY
Qty: 30 CAPSULE | Refills: 2 | Status: SHIPPED | OUTPATIENT
Start: 2024-02-21

## 2024-02-21 NOTE — PROGRESS NOTES
"  SUBJECTIVE:    Patient ID: Oneida Ingram is a 33 y.o. female.    Chief Complaint: Follow-up (No bottles// no refills//pt states she been depressed on and off since November due to mom passing "dad is doing stupid stuff" pt states she sleeps all day// phq-9=21)    33-year-old female presents for urgent visit. She has a PMHx of Anxiety, MDD, and PCOS add. Has been seen in our office over the last 2 months Started on ambien due to inability to sleep.this has helped but feels down. Mother recently passed away. Recently found out father remarried. Still seeing therapist. Increased frequency of visits to weekly. Still taking lexapro. Does not want to get out of bed. Tearful. Still goes to work 2 days per week.     Follow-up  Pertinent negatives include no abdominal pain, arthralgias, chest pain, chills, coughing, fever, headaches, nausea, rash, sore throat, vomiting or weakness.       Patient Outreach on 12/29/2023   Component Date Value Ref Range Status    CRC Recommendation External 12/27/2023 No follow-up frequency specified   Final    PAP Recommendation External 06/12/2023 Cotesting in 5 years   Final    Pap 06/12/2023 Epithelial cell abnormality (A)  Negative for intraephithelial lesion or malignancy, Other Final    HPV DNA 06/12/2023 None Detected  None Detected Final       Past Medical History:   Diagnosis Date    Anxiety     MRSA pneumonia     Pneumonia      Social History     Socioeconomic History    Marital status:    Tobacco Use    Smoking status: Never    Smokeless tobacco: Never   Substance and Sexual Activity    Alcohol use: Yes     Comment: socially    Drug use: No    Sexual activity: Yes     Partners: Male     Birth control/protection: None   Social History Narrative    Plans from [facility]         GYN Visit & History 10 [facility]1/7/2019     Obesity    PCOS        Visit Summary:    Down 5 pounds    UDS/ reviewed    Reviewed labs with patient    Discussed diet and exercise        Prescriptions:    " SIG: metformin 500 mg oral tablet, 30 days, Dispense #120 Tablet, 5 Refills    Directions: take once daily for 2 weeks then increase to BID for 2 weeks, then increase to 2 po BID    SIG: phentermine 37.5 mg oral tablet, 30 days, Dispense #30 Tablet, 0 Refills    Directions: Take 1 oral tablet once a day        Plan:    Return for follow up appointment in 1 month        Nancy Saravia NP     GYN Visit & History 10 [facility]0/2/2019     Obesity    Irregular menses    hx of ovarian cysts        Visit Summary:    PCOS panel 1 week prior to return appointment    UDS/ reviewed    Discussed diet and exercise at length        Prescriptions:    SIG: phentermine 37.5 mg oral tablet, 30 days, Dispense #30 Tablet, 0 Refills    Directions: Take 1 oral tablet once a day    SIG: Prenatal 28 mg iron- 800 mcg oral tablet, 30 days, Dispense #30 Tablet, 0 Refills    Directions: Take 1 oral tablet once a day        Plan:    Return for follow up appointment in1 month    Weight loss goal set for 8-10 pounds        Nancy Saravia NP     GYN Exam (Annual/6Wk PP)10 [facility]2/13/2016     Annual    Depression/Anxiety    Obesity    Break-through bleeding with OCP        Visit Summary    Ordered:    Blood Drawing    Pap IG, Ct-Ng, rfx HPV ASCU    CBC With Differential/Platelet    Glucose, Serum    Lipid Panel    UA w/o Micro: within normal limits    UPT: negative        Prescriptions:    SIG: NuvaRing 0.12-0.015 mg/24 hr ring,  days, Dispense #1 Ring, 3 Refills    Directions: one ring vaginally x 21 days, then remove and discard. Leave out for 7 days, then insert new ring.    Zoloft working for depression management.    Pt has a PCP for depression management, discussed counseling, exercise etc.        Return for follow up appointment in one year or prn.     GYN Visit & [id number] [facility]7/25/2016     [facility] k pneumonia. w same sensitivies as k pneumonia uti in 5/6.........gb us nl...renal us nl....co persisatnt pp...and frequency and urgency and  nocgturia.....modeerat...x 1 y        a-relapsing k npuemonia uti,,..diarrhea x 1 mo            p-cipro 500 mg bid x 10 d...rtc 3 w for repaeat ucs and if still [prese nt will refer to dr mccallum......stool for ova and parasite and c deficil     GYN Visit & [id number] [facility]2016     27 yo........daily cramping in pelvis ...moderate....intermittendt and daily....duration 1 h....not worse w activity...u/s nl...on ocp.....associated w dysmen...requires advil and improves w nsaia....no dysp...no dysuria....frequency and urgency..x 1 yr, not worsening, but w apin associated with right cvat....h/o kleb uti ......sedc rate 14 on ....bm 2-4x/d, x 2 mos....sp cs...appy....ho mrsa pneumonia w intubation and blood transfusion 2 yrs....        a-pelvic p[ain, dysmenorrhea, urinary frequency and urgency....poss renal etiology...r/o gb and renal dis        p-renal and gb us...ucs.....rtc 1 wk.....consider dx lap     GYN Visit & History2016     Pelvic pain: intermittent    Contraceptive counseling    urinary tract infection        Plan: Finish Keflex.    Pt did not take doxy as prescribed.    Recommended to take the full 10 days.    Reviewed u/s and labs with patient.    Track pain, continue OCP.    Return for follow up appointment in 2 months for follow up with Dr Horan or sooner if needed.    Terazol esent if needed for yeast r/t prolonged antibiotic use.     GYN Visit & History2016     27 yo........pp x 2mos.....ocp 2-3 wk....cramping w sharp exacerbagtion...right great than left...no n/v...post coital pelvic pain....fever x 1 d...pos appy 10 yr ago        a-pelvic pain....vag dc....        p-gc chl affirm ucs cbc sed rate...doxy 100 bid x 10...us....rtc 1 w     GYN Exam (Annual/6Wk PP)     Annual    Obesity        Plan: Pap with gc/ct    Adipex refilled    Zyrtec refilled.    Diet and exercise discussed.    Return for follow up appointment one year or prn.     GYN Visit & F/      Endometritis, resolved.  Obesity.  Family history of breast cancer.    Bilateral breast ultrasound at [facility].    Prescription for Contrave.    Return to clinic in one month.     GYN Exam (Annual/6Wk PP)     Annual exam.  Contraceptive management.  Strong family history of breast cancer.  Right breast cyst.  Endometritis.    CBC and sed.  Rate today.    Change birth control to Mircette 1 by mouth daily.    Doxycycline and Flagyl x2 weeks.    Ultrasound with radiology of bilateral breasts.    BRCA gene testing.     GYN Visit & F/     Irregular menstrual cycle        Plan: Beta today    If starts menses, restart OCP.    Condom use.    Return for follow up appointment prn pending results.     GYN Visit & F/     Yeast vulvovaginitis    Recent MRSA pneumonia, S/P 3 week ICU stay        Plan: Diflucan 150mg daily #1    Mycolog ointment esent    Continue PT and follow up with PCP as scheduled    Return for follow up appointment prn.     GYN Visit & /     Bacterial Vaginosis        Plan: Flagyl 500mg I PO BID, pt states she does have rx at home    Repeat diflucan.    Pt has no insurance at this time.        Return for follow up appointment prn.     GYN Visit & F/     co uri...on tricycle....        o- heent adenopathy.....cx clean        a-sp luz maria 1 ..uri        p-pap...amp 500 tid.....rtc 6 mo p/p....     GYN Visit & F/     cx bx hpv w/o dysplasia.....no tob...rtc 6 mos pap...Gardasil at hd     DAY GYN Visit + History2013     colpo-prob luz maria 2.....bx at 600/900.........rtc 1 wk     Past Surgical History:   Procedure Laterality Date    APPENDECTOMY      BRONCHOSCOPY       SECTION, LOW TRANSVERSE      DILATION AND CURETTAGE OF UTERUS N/A 2020    Procedure: DILATION AND CURETTAGE, UTERUS;  Surgeon: Srinviasan Stewart MD;  Location: [facility] OR;  Service: OB/GYN;  Laterality: N/A;    HYSTEROSCOPIC POLYPECTOMY OF UTERUS N/A 2020     Procedure: POLYPECTOMY, UTERUS, HYSTEROSCOPIC;  Surgeon: Srinivasan Stewart MD;  Location: [facility] OR;  Service: OB/GYN;  Laterality: N/A;    HYSTEROSCOPY N/A 8/12/2020    Procedure: HYSTEROSCOPY;  Surgeon: Srinivasan Stewart MD;  Location: [facility] OR;  Service: OB/GYN;  Laterality: N/A;    TONSILLECTOMY       Family History   Problem Relation Age of Onset    COPD Mother     Asthma Mother     Heart disease Mother     Heart disease Brother     Heart disease Maternal Grandfather     Diabetes Paternal Grandmother     Heart disease Paternal Grandmother     Breast cancer Paternal Aunt     Breast cancer Paternal Aunt     Breast cancer Paternal Cousin     Immunodeficiency Neg Hx     Eczema Neg Hx     Rhinitis Neg Hx        All of your core healthy metrics are met.      Review of patient's allergies indicates:   Allergen Reactions    Azithromycin Hives    Bactrim [sulfamethoxazole-trimethoprim]      Rash     Ciprofloxacin      Doesn't remember ? Rash     Clindamycin Hives    Doxycycline Hives    Naltrexone-bupropion Nausea And Vomiting and Other (See Comments)     hands and face numbness    Penicillins Rash     Tiny raised bumps. Not urticarial- 10 years.        Current Outpatient Medications:     aspirin (ECOTRIN) 81 MG EC tablet, Take 81 mg by mouth once daily., Disp: , Rfl:     clonazePAM (KLONOPIN) 0.5 MG tablet, Take 1 tablet (0.5 mg total) by mouth 2 (two) times daily as needed for Anxiety., Disp: 60 tablet, Rfl: 5    EScitalopram oxalate (LEXAPRO) 10 MG tablet, Take 1 tablet (10 mg total) by mouth once daily., Disp: 90 tablet, Rfl: 3    fluticasone propionate (FLONASE) 50 mcg/actuation nasal spray, 2 sprays (100 mcg total) by Each Nostril route once daily., Disp: 18.2 mL, Rfl: 2    levocetirizine (XYZAL) 5 MG tablet, Take 1 tablet (5 mg total) by mouth once daily., Disp: 30 tablet, Rfl: 2    metoprolol succinate (TOPROL-XL) 25 MG 24 hr tablet, Take 0.5 tablets (12.5 mg total) by mouth once daily., Disp: 45 tablet, Rfl:  5    norethindrone-ethinyl estradiol (JUNEL FE 1/20) 1 mg-20 mcg (21)/75 mg (7) per tablet, Take 1 tablet by mouth., Disp: , Rfl:     vitamin D (VITAMIN D3) 1000 units Tab, Take 1,000 Units by mouth once daily., Disp: , Rfl:     zolpidem (AMBIEN) 10 mg Tab, Take 1 tablet (10 mg total) by mouth nightly as needed., Disp: 30 tablet, Rfl: 2    ascorbic acid, vitamin C, (VITAMIN C) 1000 MG tablet, Take 1,000 mg by mouth., Disp: , Rfl:     cariprazine (VRAYLAR) 1.5 mg Cap, Take 1 capsule (1.5 mg total) by mouth Daily., Disp: 30 capsule, Rfl: 2    cinnamon bark 500 mg capsule, Take 500 mg by mouth once daily., Disp: , Rfl:     norgestimate-ethinyl estradioL (ORTHO TRI-CYCLEN LO) 0.18/0.215/0.25 mg-25 mcg tablet, Take 1 tablet by mouth once daily., Disp: , Rfl:     UNABLE TO FIND, Take 1 tablet by mouth once daily. Methyl-balance, Disp: , Rfl:     UNABLE TO FIND, Take 4 capsules by mouth once daily. emery & Dchiro inositol, Disp: , Rfl:     UNABLE TO FIND, Take 2 capsules by mouth once daily. Calm  Sleep capsules, Disp: , Rfl:     vitamin E 400 UNIT capsule, Take 400 Units by mouth once daily., Disp: , Rfl:     zinc gluconate 50 mg tablet, Take 50 mg by mouth., Disp: , Rfl:     zolpidem (AMBIEN) 5 MG Tab, Take 1 tablet (5 mg total) by mouth nightly as needed. (Patient not taking: Reported on 1/8/2024), Disp: 30 tablet, Rfl: 0    Review of Systems   Constitutional:  Negative for chills, fever and unexpected weight change.   HENT:  Negative for ear pain, rhinorrhea and sore throat.    Eyes:  Negative for pain and visual disturbance.   Respiratory:  Negative for cough and shortness of breath.    Cardiovascular:  Negative for chest pain, palpitations and leg swelling.   Gastrointestinal:  Negative for abdominal pain, diarrhea, nausea and vomiting.   Genitourinary:  Negative for difficulty urinating, hematuria and vaginal bleeding.   Musculoskeletal:  Negative for arthralgias.   Skin:  Negative for rash.   Neurological:  Negative  "for dizziness, weakness and headaches.   Psychiatric/Behavioral:  Positive for agitation. Negative for sleep disturbance. The patient is not nervous/anxious.           Objective:      Vitals:    02/21/24 1337 02/21/24 1409   BP: (!) 126/96 122/86   Pulse: 84    SpO2: 97%    Weight: 104.8 kg (231 lb)    Height: 5' 8" (1.727 m)      Physical Exam  Vitals and nursing note reviewed.   Constitutional:       Appearance: Normal appearance. She is well-developed.   Neck:      Vascular: No JVD.   Cardiovascular:      Rate and Rhythm: Normal rate and regular rhythm.      Heart sounds: No murmur heard.  Pulmonary:      Effort: Pulmonary effort is normal.      Breath sounds: Normal breath sounds.   Abdominal:      General: Bowel sounds are normal.      Palpations: Abdomen is soft.   Musculoskeletal:         General: No deformity. Normal range of motion.      Cervical back: Normal range of motion and neck supple.   Lymphadenopathy:      Cervical: No cervical adenopathy.   Skin:     General: Skin is warm and dry.      Findings: No rash.   Neurological:      Mental Status: She is alert and oriented to person, place, and time.      Gait: Gait normal.   Psychiatric:         Speech: Speech normal.         Behavior: Behavior normal.           Assessment:       1. Recurrent major depressive disorder, in full remission    2. Grief    3. Anxiety         Plan:       Recurrent major depressive disorder, in full remission  Comments:  vraylar once a day    Grief  Comments:  continue therapy.    Anxiety  Comments:  call if no improvement. will follow closely for now    Other orders  -     cariprazine (VRAYLAR) 1.5 mg Cap; Take 1 capsule (1.5 mg total) by mouth Daily.  Dispense: 30 capsule; Refill: 2      Follow up in about 2 weeks (around 3/6/2024), or if symptoms worsen or fail to improve, for medication management.        2/21/2024 Peace Gray      "

## 2024-02-27 ENCOUNTER — PATIENT MESSAGE (OUTPATIENT)
Dept: FAMILY MEDICINE | Facility: CLINIC | Age: 34
End: 2024-02-27
Payer: COMMERCIAL

## 2024-02-29 ENCOUNTER — TELEPHONE (OUTPATIENT)
Dept: FAMILY MEDICINE | Facility: CLINIC | Age: 34
End: 2024-02-29
Payer: COMMERCIAL

## 2024-02-29 NOTE — TELEPHONE ENCOUNTER
----- Message from Allie Mack sent at 2/29/2024  8:50 AM CST -----  [facility] is calling to let us know she needs a prior auth on vraylar. The preferred method would be with cover my meds. They can also accept them over the phone or fax.   Phone [phone]  Fax [phone]     Recommended yearly dilated eye examinations.

## 2024-03-07 ENCOUNTER — PATIENT MESSAGE (OUTPATIENT)
Dept: FAMILY MEDICINE | Facility: CLINIC | Age: 34
End: 2024-03-07
Payer: COMMERCIAL

## 2024-03-07 DIAGNOSIS — F41.9 ANXIETY: ICD-10-CM

## 2024-03-07 RX ORDER — CLONAZEPAM 0.5 MG/1
0.5 TABLET ORAL 2 TIMES DAILY PRN
Qty: 60 TABLET | Refills: 5 | Status: SHIPPED | OUTPATIENT
Start: 2024-03-07

## 2024-03-14 ENCOUNTER — TELEPHONE (OUTPATIENT)
Dept: FAMILY MEDICINE | Facility: CLINIC | Age: 34
End: 2024-03-14
Payer: COMMERCIAL

## 2024-03-14 DIAGNOSIS — Z00.00 ROUTINE GENERAL MEDICAL EXAMINATION AT A HEALTH CARE FACILITY: Primary | ICD-10-CM

## 2024-03-14 DIAGNOSIS — Z79.899 ENCOUNTER FOR LONG-TERM (CURRENT) USE OF OTHER MEDICATIONS: ICD-10-CM

## 2024-03-25 ENCOUNTER — PATIENT MESSAGE (OUTPATIENT)
Dept: FAMILY MEDICINE | Facility: CLINIC | Age: 34
End: 2024-03-25
Payer: COMMERCIAL

## 2024-03-26 ENCOUNTER — OFFICE VISIT (OUTPATIENT)
Dept: FAMILY MEDICINE | Facility: CLINIC | Age: 34
End: 2024-03-26
Payer: COMMERCIAL

## 2024-03-26 DIAGNOSIS — F41.9 ANXIETY: Primary | ICD-10-CM

## 2024-03-26 DIAGNOSIS — J06.9 VIRAL URI: Chronic | ICD-10-CM

## 2024-03-26 DIAGNOSIS — F43.21 GRIEF: ICD-10-CM

## 2024-03-26 DIAGNOSIS — F33.42 RECURRENT MAJOR DEPRESSIVE DISORDER, IN FULL REMISSION: ICD-10-CM

## 2024-03-26 DIAGNOSIS — G47.00 INSOMNIA, UNSPECIFIED TYPE: ICD-10-CM

## 2024-03-26 PROCEDURE — 99214 OFFICE O/P EST MOD 30 MIN: CPT | Mod: 95,,, | Performed by: NURSE PRACTITIONER

## 2024-03-26 PROCEDURE — 1160F RVW MEDS BY RX/DR IN RCRD: CPT | Mod: CPTII,95,, | Performed by: NURSE PRACTITIONER

## 2024-03-26 PROCEDURE — 1159F MED LIST DOCD IN RCRD: CPT | Mod: CPTII,95,, | Performed by: NURSE PRACTITIONER

## 2024-03-26 RX ORDER — FLUTICASONE PROPIONATE 50 MCG
2 SPRAY, SUSPENSION (ML) NASAL DAILY
Qty: 18.2 ML | Refills: 2 | Status: SHIPPED | OUTPATIENT
Start: 2024-03-26

## 2024-03-26 RX ORDER — LEVOCETIRIZINE DIHYDROCHLORIDE 5 MG/1
5 TABLET, FILM COATED ORAL DAILY
Qty: 30 TABLET | Refills: 2 | Status: SHIPPED | OUTPATIENT
Start: 2024-03-26

## 2024-03-26 NOTE — PROGRESS NOTES
Subjective:        The chief complaint leading to consultation is: follow up  The patient location is:  Home  Visit type: Virtual visit with synchronous audio/video or audio only  This was a video visit in lieu of in-person visit due to the coronavirus emergency. Patient acknowledged and consented to the video visit encounter.     33-year-old female presents for follow up virtual visit. She was seen in our office on . She has a PMHx of Anxiety, MDD, and PCOS add. Meds were adjusted at that visit due to depression/anxiety. Being followed closely by therapist. Mother recently passed away. Does report that meds are helping her get out of bed. However does seem irritable some during the day. Not as tearful. No homicidal/suicidal ideations    Follow-up  Associated symptoms include arthralgias. Pertinent negatives include no abdominal pain, chest pain, chills, coughing, fever, headaches, joint swelling, nausea, neck pain, rash, sore throat, vomiting or weakness.       Past Surgical History:   Procedure Laterality Date    APPENDECTOMY      BRONCHOSCOPY       SECTION, LOW TRANSVERSE      DILATION AND CURETTAGE OF UTERUS N/A 2020    Procedure: DILATION AND CURETTAGE, UTERUS;  Surgeon: Srinivasan Stewart MD;  Location: [facility] OR;  Service: OB/GYN;  Laterality: N/A;    HYSTEROSCOPIC POLYPECTOMY OF UTERUS N/A 2020    Procedure: POLYPECTOMY, UTERUS, HYSTEROSCOPIC;  Surgeon: Srinivasan Stewart MD;  Location: [facility] OR;  Service: OB/GYN;  Laterality: N/A;    HYSTEROSCOPY N/A 2020    Procedure: HYSTEROSCOPY;  Surgeon: Srinivasan Stewart MD;  Location: [facility] OR;  Service: OB/GYN;  Laterality: N/A;    TONSILLECTOMY       Past Medical History:   Diagnosis Date    Anxiety     MRSA pneumonia     Pneumonia      Family History   Problem Relation Age of Onset    COPD Mother     Asthma Mother     Heart disease Mother     Heart disease Brother     Heart disease Maternal Grandfather     Diabetes Paternal  Grandmother     Heart disease Paternal Grandmother     Breast cancer Paternal Aunt     Breast cancer Paternal Aunt     Breast cancer Paternal Cousin     Immunodeficiency Neg Hx     Eczema Neg Hx     Rhinitis Neg Hx         Social History:   Marital Status:   Alcohol History:  reports current alcohol use.  Tobacco History:  reports that she has never smoked. She has never used smokeless tobacco.  Drug History:  reports no history of drug use.    Review of patient's allergies indicates:   Allergen Reactions    Azithromycin Hives    Bactrim [sulfamethoxazole-trimethoprim]      Rash     Ciprofloxacin      Doesn't remember ? Rash     Clindamycin Hives    Doxycycline Hives    Naltrexone-bupropion Nausea And Vomiting and Other (See Comments)     hands and face numbness    Penicillins Rash     Tiny raised bumps. Not urticarial- 10 years.        Current Outpatient Medications   Medication Sig Dispense Refill    ascorbic acid, vitamin C, (VITAMIN C) 1000 MG tablet Take 1,000 mg by mouth.      aspirin (ECOTRIN) 81 MG EC tablet Take 81 mg by mouth once daily.      cariprazine (VRAYLAR) 1.5 mg Cap Take 1 capsule (1.5 mg total) by mouth Daily. 30 capsule 2    cinnamon bark 500 mg capsule Take 500 mg by mouth once daily.      clonazePAM (KLONOPIN) 0.5 MG tablet Take 1 tablet (0.5 mg total) by mouth 2 (two) times daily as needed for Anxiety. 60 tablet 5    EScitalopram oxalate (LEXAPRO) 10 MG tablet Take 1 tablet (10 mg total) by mouth once daily. 90 tablet 3    fluticasone propionate (FLONASE) 50 mcg/actuation nasal spray 2 sprays (100 mcg total) by Each Nostril route once daily. 18.2 mL 2    levocetirizine (XYZAL) 5 MG tablet Take 1 tablet (5 mg total) by mouth once daily. 30 tablet 2    metoprolol succinate (TOPROL-XL) 25 MG 24 hr tablet Take 0.5 tablets (12.5 mg total) by mouth once daily. 45 tablet 5    norethindrone-ethinyl estradiol (JUNEL FE 1/20) 1 mg-20 mcg (21)/75 mg (7) per tablet Take 1 tablet by mouth.       norgestimate-ethinyl estradioL (ORTHO TRI-CYCLEN LO) 0.18/0.215/0.25 mg-25 mcg tablet Take 1 tablet by mouth once daily.      UNABLE TO FIND Take 1 tablet by mouth once daily. Methyl-balance      UNABLE TO FIND Take 4 capsules by mouth once daily. emery & Dchiro inositol      UNABLE TO FIND Take 2 capsules by mouth once daily. Calm  Sleep capsules      vitamin D (VITAMIN D3) 1000 units Tab Take 1,000 Units by mouth once daily.      vitamin E 400 UNIT capsule Take 400 Units by mouth once daily.      zinc gluconate 50 mg tablet Take 50 mg by mouth.      zolpidem (AMBIEN) 5 MG Tab Take 1 tablet (5 mg total) by mouth nightly as needed. (Patient not taking: Reported on 1/8/2024) 30 tablet 0     No current facility-administered medications for this visit.       Review of Systems   Constitutional:  Negative for activity change, chills, fever and unexpected weight change.   HENT:  Positive for rhinorrhea. Negative for hearing loss, sore throat and trouble swallowing.    Eyes:  Negative for discharge and visual disturbance.   Respiratory:  Negative for cough, chest tightness and wheezing.    Cardiovascular:  Positive for palpitations. Negative for chest pain.   Gastrointestinal:  Positive for constipation. Negative for abdominal pain, blood in stool, diarrhea, nausea and vomiting.   Endocrine: Negative for polydipsia and polyuria.   Genitourinary:  Negative for difficulty urinating, dysuria, hematuria and menstrual problem.   Musculoskeletal:  Positive for arthralgias. Negative for joint swelling and neck pain.   Skin:  Negative for rash.   Neurological:  Negative for weakness and headaches.   Psychiatric/Behavioral:  Positive for agitation. Negative for confusion and dysphoric mood.          Objective:        Physical Exam:   Physical Exam  Constitutional:       General: She is not in acute distress.     Appearance: Normal appearance.   Neurological:      Mental Status: She is alert.              Assessment:       1. Anxiety     2. Viral URI    3. Grief    4. Insomnia, unspecified type    5. Recurrent major depressive disorder, in full remission      Plan:   Anxiety  Comments:  lexapro    Viral URI  Comments:  ok for flonase. improving  Orders:  -     fluticasone propionate (FLONASE) 50 mcg/actuation nasal spray; 2 sprays (100 mcg total) by Each Nostril route once daily.  Dispense: 18.2 mL; Refill: 2    Grief  Comments:  continue therapy    Insomnia, unspecified type    Recurrent major depressive disorder, in full remission  Comments:  decrease vraylar to every other day    Other orders  -     levocetirizine (XYZAL) 5 MG tablet; Take 1 tablet (5 mg total) by mouth once daily.  Dispense: 30 tablet; Refill: 2      Follow up in about 4 weeks (around 4/23/2024), or if symptoms worsen or fail to improve, for medication management.    Total time spent with patient: 25    Each patient to whom he or she provides medical services by telemedicine is:  (1) informed of the relationship between the physician and patient and the respective role of any other health care provider with respect to management of the patient; and (2) notified that he or she may decline to receive medical services by telemedicine and may withdraw from such care at any time.

## 2024-04-04 ENCOUNTER — PATIENT MESSAGE (OUTPATIENT)
Dept: FAMILY MEDICINE | Facility: CLINIC | Age: 34
End: 2024-04-04
Payer: COMMERCIAL

## 2024-04-21 ENCOUNTER — PATIENT MESSAGE (OUTPATIENT)
Dept: FAMILY MEDICINE | Facility: CLINIC | Age: 34
End: 2024-04-21
Payer: COMMERCIAL

## 2024-04-21 DIAGNOSIS — F43.21 GRIEF: Primary | ICD-10-CM

## 2024-04-21 DIAGNOSIS — F41.9 ANXIETY: ICD-10-CM

## 2024-04-29 ENCOUNTER — OFFICE VISIT (OUTPATIENT)
Dept: FAMILY MEDICINE | Facility: CLINIC | Age: 34
End: 2024-04-29
Payer: COMMERCIAL

## 2024-04-29 ENCOUNTER — TELEPHONE (OUTPATIENT)
Dept: FAMILY MEDICINE | Facility: CLINIC | Age: 34
End: 2024-04-29

## 2024-04-29 VITALS
BODY MASS INDEX: 36.37 KG/M2 | SYSTOLIC BLOOD PRESSURE: 136 MMHG | DIASTOLIC BLOOD PRESSURE: 82 MMHG | HEART RATE: 84 BPM | WEIGHT: 240 LBS | HEIGHT: 68 IN

## 2024-04-29 DIAGNOSIS — E66.9 OBESITY (BMI 30-39.9): ICD-10-CM

## 2024-04-29 DIAGNOSIS — F43.21 GRIEF: ICD-10-CM

## 2024-04-29 DIAGNOSIS — F41.9 ANXIETY: ICD-10-CM

## 2024-04-29 DIAGNOSIS — E28.2 PCOS (POLYCYSTIC OVARIAN SYNDROME): ICD-10-CM

## 2024-04-29 DIAGNOSIS — G47.00 INSOMNIA, UNSPECIFIED TYPE: ICD-10-CM

## 2024-04-29 DIAGNOSIS — E16.2 HYPOGLYCEMIA: Primary | ICD-10-CM

## 2024-04-29 LAB — HBA1C MFR BLD: 5.6 %

## 2024-04-29 PROCEDURE — 83036 HEMOGLOBIN GLYCOSYLATED A1C: CPT | Mod: QW,,, | Performed by: NURSE PRACTITIONER

## 2024-04-29 PROCEDURE — 3079F DIAST BP 80-89 MM HG: CPT | Mod: CPTII,S$GLB,, | Performed by: NURSE PRACTITIONER

## 2024-04-29 PROCEDURE — 3044F HG A1C LEVEL LT 7.0%: CPT | Mod: CPTII,S$GLB,, | Performed by: NURSE PRACTITIONER

## 2024-04-29 PROCEDURE — 3008F BODY MASS INDEX DOCD: CPT | Mod: CPTII,S$GLB,, | Performed by: NURSE PRACTITIONER

## 2024-04-29 PROCEDURE — 99214 OFFICE O/P EST MOD 30 MIN: CPT | Mod: S$GLB,,, | Performed by: NURSE PRACTITIONER

## 2024-04-29 PROCEDURE — 3075F SYST BP GE 130 - 139MM HG: CPT | Mod: CPTII,S$GLB,, | Performed by: NURSE PRACTITIONER

## 2024-04-29 RX ORDER — ZOLPIDEM TARTRATE 10 MG/1
10 TABLET ORAL NIGHTLY PRN
COMMUNITY
Start: 2024-03-12 | End: 2024-05-07 | Stop reason: SDUPTHER

## 2024-04-29 RX ORDER — TRIAMCINOLONE ACETONIDE 1 MG/G
PASTE DENTAL
COMMUNITY
Start: 2024-03-12

## 2024-04-29 RX ORDER — TIRZEPATIDE 5 MG/.5ML
5 INJECTION, SOLUTION SUBCUTANEOUS
Qty: 2 ML | Refills: 0 | Status: SHIPPED | OUTPATIENT
Start: 2024-04-29 | End: 2024-06-05

## 2024-04-29 NOTE — TELEPHONE ENCOUNTER
----- Message from Jeane Fuchs sent at 4/29/2024  1:15 PM CDT -----  Pt would like to know if she needs to schedule a 1 month f/u and if so give her a call to schedule it.    [phone]

## 2024-04-30 ENCOUNTER — OFFICE VISIT (OUTPATIENT)
Dept: URGENT CARE | Facility: CLINIC | Age: 34
End: 2024-04-30
Payer: COMMERCIAL

## 2024-04-30 VITALS
WEIGHT: 240 LBS | BODY MASS INDEX: 36.37 KG/M2 | HEIGHT: 68 IN | SYSTOLIC BLOOD PRESSURE: 114 MMHG | TEMPERATURE: 98 F | RESPIRATION RATE: 18 BRPM | HEART RATE: 78 BPM | OXYGEN SATURATION: 98 % | DIASTOLIC BLOOD PRESSURE: 79 MMHG

## 2024-04-30 DIAGNOSIS — R31.9 HEMATURIA, UNSPECIFIED TYPE: ICD-10-CM

## 2024-04-30 DIAGNOSIS — R39.9 UTI SYMPTOMS: ICD-10-CM

## 2024-04-30 DIAGNOSIS — R10.9 RIGHT FLANK PAIN: Primary | ICD-10-CM

## 2024-04-30 LAB
B-HCG UR QL: NEGATIVE
BILIRUB UR QL STRIP: POSITIVE
CTP QC/QA: YES
GLUCOSE UR QL STRIP: NEGATIVE
KETONES UR QL STRIP: NEGATIVE
LEUKOCYTE ESTERASE UR QL STRIP: NEGATIVE
PH, POC UA: 6
POC BLOOD, URINE: POSITIVE
POC NITRATES, URINE: NEGATIVE
PROT UR QL STRIP: POSITIVE
SP GR UR STRIP: 1.03 (ref 1–1.03)
UROBILINOGEN UR STRIP-ACNC: ABNORMAL (ref 0.1–1.1)

## 2024-04-30 PROCEDURE — 81025 URINE PREGNANCY TEST: CPT | Mod: S$GLB,,, | Performed by: NURSE PRACTITIONER

## 2024-04-30 PROCEDURE — 81003 URINALYSIS AUTO W/O SCOPE: CPT | Mod: QW,S$GLB,, | Performed by: NURSE PRACTITIONER

## 2024-04-30 PROCEDURE — 96372 THER/PROPH/DIAG INJ SC/IM: CPT | Mod: S$GLB,,, | Performed by: EMERGENCY MEDICINE

## 2024-04-30 PROCEDURE — 99214 OFFICE O/P EST MOD 30 MIN: CPT | Mod: 25,S$GLB,, | Performed by: NURSE PRACTITIONER

## 2024-04-30 RX ORDER — NITROFURANTOIN 25; 75 MG/1; MG/1
100 CAPSULE ORAL 2 TIMES DAILY
Qty: 10 CAPSULE | Refills: 0 | Status: SHIPPED | OUTPATIENT
Start: 2024-04-30 | End: 2024-05-05

## 2024-04-30 RX ORDER — KETOROLAC TROMETHAMINE 30 MG/ML
30 INJECTION, SOLUTION INTRAMUSCULAR; INTRAVENOUS
Status: COMPLETED | OUTPATIENT
Start: 2024-04-30 | End: 2024-04-30

## 2024-04-30 RX ADMIN — KETOROLAC TROMETHAMINE 30 MG: 30 INJECTION, SOLUTION INTRAMUSCULAR; INTRAVENOUS at 05:04

## 2024-04-30 NOTE — PROGRESS NOTES
"Subjective:      Patient ID: Oneida Ingram is a 34 y.o. female.    Vitals:  height is 5' 8" (1.727 m) and weight is 108.9 kg (240 lb). Her oral temperature is 98.2 °F (36.8 °C). Her blood pressure is 114/79 and her pulse is 78. Her respiration is 18 and oxygen saturation is 98%.     Chief Complaint: Urinary Tract Infection    Patient reports sudden onset of right flank pain radiating into right lower abdomen.  Began today.  Additional symptoms include sweating, nausea, and difficulty urinating.  Patient does endorse that she is on her period at this time.    Urinary Tract Infection   This is a new problem. The current episode started today. Associated symptoms include chills, flank pain, nausea and sweats. Pertinent negatives include no frequency, hematuria, urgency, vomiting, constipation or rash.       Constitution: Positive for chills. Negative for activity change, appetite change, fatigue, fever, unexpected weight change and generalized weakness.   HENT:  Negative for ear pain, ear discharge, foreign body in ear, tinnitus, hearing loss, dental problem, mouth sores, tongue pain, facial swelling, congestion, postnasal drip, sinus pain, sinus pressure, sore throat, trouble swallowing and voice change.    Neck: Negative for neck pain, neck stiffness and painful lymph nodes.   Cardiovascular:  Negative for chest pain, leg swelling, palpitations and sob on exertion.   Eyes:  Negative for eye trauma, eye discharge, eye itching, eye pain, eye redness, vision loss and eyelid swelling.   Respiratory:  Negative for chest tightness, cough, sputum production, COPD, shortness of breath, wheezing and asthma.    Gastrointestinal:  Positive for nausea. Negative for abdominal pain, vomiting, constipation, diarrhea, bright red blood in stool and dark colored stools.   Endocrine: hair loss, cold intolerance and heat intolerance.   Genitourinary:  Positive for urine decreased and flank pain. Negative for dysuria, frequency, urgency " and hematuria.   Musculoskeletal:  Negative for pain, trauma, joint pain, joint swelling, abnormal ROM of joint and muscle ache.   Skin:  Negative for color change, pale, rash, wound and hives.   Allergic/Immunologic: Negative for environmental allergies, seasonal allergies, food allergies, asthma, hives and itching.   Neurological:  Negative for dizziness, history of vertigo, light-headedness, facial drooping, speech difficulty, headaches, disorientation, altered mental status, loss of consciousness and numbness.   Hematologic/Lymphatic: Negative for swollen lymph nodes and easy bruising/bleeding. Does not bruise/bleed easily.   Psychiatric/Behavioral:  Negative for altered mental status, disorientation, confusion, agitation, sleep disturbance and hallucinations.       Objective:     Physical Exam   Constitutional: She is oriented to person, place, and time. She appears well-developed. She is cooperative.      Comments:Patient unable to sit down due to pain, holding right flank     HENT:   Head: Normocephalic and atraumatic.   Ears:   Right Ear: Hearing, tympanic membrane, external ear and ear canal normal.   Left Ear: Hearing, tympanic membrane, external ear and ear canal normal.   Nose: Nose normal. No mucosal edema or nasal deformity. No epistaxis. Right sinus exhibits no maxillary sinus tenderness and no frontal sinus tenderness. Left sinus exhibits no maxillary sinus tenderness and no frontal sinus tenderness.   Mouth/Throat: Uvula is midline, oropharynx is clear and moist and mucous membranes are normal. No trismus in the jaw. Normal dentition. No uvula swelling.   Eyes: Conjunctivae and lids are normal.   Neck: Trachea normal and phonation normal. Neck supple.   Cardiovascular: Normal rate, regular rhythm, normal heart sounds and normal pulses.   Pulmonary/Chest: Effort normal and breath sounds normal.   Abdominal: Normal appearance and bowel sounds are normal. Soft. There is no abdominal tenderness. There  is no left CVA tenderness and no right CVA tenderness.   Musculoskeletal: Normal range of motion.         General: Normal range of motion.   Neurological: She is alert and oriented to person, place, and time. She exhibits normal muscle tone.   Skin: Skin is warm, dry, intact and pale.   Psychiatric: Her speech is normal and behavior is normal. Judgment and thought content normal.   Nursing note and vitals reviewed.      Assessment:     1. Right flank pain    2. UTI symptoms    3. Hematuria, unspecified type        Plan:       Right flank pain  -     ketorolac injection 30 mg administered intramuscularly in clinic  -     CT Renal Stone Study ABD Pelvis WO; Future; Expected date: 04/30/2024    UTI symptoms  -     POCT Urinalysis, Dipstick, Automated, W/O Scope  -     POCT URINE PREGNANCY  -     Urine culture  -     nitrofurantoin, macrocrystal-monohydrate, (MACROBID) 100 MG capsule; Take 1 capsule (100 mg total) by mouth 2 (two) times daily. for 5 days  Dispense: 10 capsule; Refill: 0    Hematuria, unspecified type    Utilize over-the-counter Tylenol or Motrin as directed for fever.    Ensure adequate fluid intake with electrolytes.    Return to clinic for new or worsening symptoms.  Patient is recommended to follow-up with their PCP post discharge.    Total time spent on med rec, H&P, with over half of the time in direct patient care: 35 minutes         Additional MDM:     Heart Failure Score:   COPD = No

## 2024-05-01 NOTE — PROGRESS NOTES
SUBJECTIVE:    Patient ID: Oneida Ingram is a 34 y.o. female.    Chief Complaint: Depression (Wants to discuss medication // states things are getting better since recent dosage change // did not bring bottles ac) and Weight Gain (States Vryalar is causing her weight gain, wants to discuss weight loss injections and possible low blood sugar issues. ac)    34-year-old female presents for urgent follow up. She has a PMHx of Anxiety, MDD, and PCOS add. Has been seeing and therapist x several months. We are working to improve depression. We did  Increase her vyralar to daily. Has noticed some improvement in moods. Started on ambien due to inability to sleep.this has helped but feels down. Mother recently passed away. Recently found out father remarried. still seeing therapist. Increased frequency of visits to weekly. Still taking lexapro.   Has been experiencing weight gain. She is not sure if related to any of the meds.     DepressionPatient is not experiencing: nervousness/anxiety, palpitations and shortness of breath.      Follow-up  Pertinent negatives include no abdominal pain, arthralgias, chest pain, chills, coughing, fever, headaches, nausea, rash, sore throat, vomiting or weakness.       Office Visit on 04/29/2024   Component Date Value Ref Range Status    Hemoglobin A1C, POC 04/29/2024 5.6  % Final   Patient Outreach on 12/29/2023   Component Date Value Ref Range Status    CRC Recommendation External 12/27/2023 No follow-up frequency specified   Final    PAP Recommendation External 06/12/2023 Cotesting in 5 years   Final    Pap 06/12/2023 Epithelial cell abnormality (A)  Negative for intraephithelial lesion or malignancy, Other Final    HPV DNA 06/12/2023 None Detected  None Detected Final       Past Medical History:   Diagnosis Date    Anxiety     MRSA pneumonia     Pneumonia      Social History     Socioeconomic History    Marital status:    Tobacco Use    Smoking status: Never    Smokeless tobacco:  Never   Substance and Sexual Activity    Alcohol use: Yes     Comment: socially    Drug use: No    Sexual activity: Yes     Partners: Male     Birth control/protection: None   Social History Narrative    Plans from [facility]         [facility]     Obesity    PCOS        Visit Summary:    Down 5 pounds    UDS/ reviewed    Reviewed labs with patient    Discussed diet and exercise        Prescriptions:    SIG: metformin 500 mg oral tablet, 30 days, Dispense #120 Tablet, 5 Refills    Directions: take once daily for 2 weeks then increase to BID for 2 weeks, then increase to 2 po BID    SIG: phentermine 37.5 mg oral tablet, 30 days, Dispense #30 Tablet, 0 Refills    Directions: Take 1 oral tablet once a day        Plan:    Return for follow up appointment in 1 month        Nancy Saravia NP     [facility]     Obesity    Irregular menses    hx of ovarian cysts        Visit Summary:    PCOS panel 1 week prior to return appointment    UDS/ reviewed    Discussed diet and exercise at length        Prescriptions:    SIG: phentermine 37.5 mg oral tablet, 30 days, Dispense #30 Tablet, 0 Refills    Directions: Take 1 oral tablet once a day    SIG: Prenatal 28 mg iron- 800 mcg oral tablet, 30 days, Dispense #30 Tablet, 0 Refills    Directions: Take 1 oral tablet once a day        Plan:    Return for follow up appointment in1 month    Weight loss goal set for 8-10 pounds        Nancy Saravia NP     GYN Exam (Annual/6Wk PP)10 [facility]13[facility]     Annual    Depression/Anxiety    Obesity    Break-through bleeding with OCP        Visit Summary    Ordered:    Blood Drawing    Pap IG, Ct-Ng, rfx HPV ASCU    CBC With Differential/Platelet    Glucose, Serum    Lipid Panel    UA w/o Micro: within normal limits    UPT: negative        Prescriptions:    SIG: NuvaRing 0.12-0.015 mg/24 hr ring,  days, Dispense #1 Ring, 3 Refills    Directions: one ring vaginally x 21 days, then remove  and discard. Leave out for 7 days, then insert new ring.    Zoloft working for depression management.    Pt has a PCP for depression management, discussed counseling, exercise etc.        Return for follow up appointment in one year or prn.     [facility]     [facility] k pneumonia. w same sensitivies as k pneumonia uti in .........gb us nl...renal us nl....co persisatnt pp...and frequency and urgency and nocgturia.....modeerat...x 1 y        a-relapsing k npuemonia uti,,..diarrhea x 1 mo            p-cipro 500 mg bid x 10 d...rtc 3 w for repaeat [facility] and if still [prese nt will refer to dr mccallum......stool for ova and parasite and c deficil     GYN Visit & Ptemdgw29 [facility]2016     27 yo........daily cramping in pelvis ...moderate....intermittendt and daily....duration 1 h....not worse w activity...u/s nl...on ocp.....associated w dysmen...requires advil and improves w nsaia....no dysp...no dysuria....frequency and urgency..x 1 yr, not worsening, but w apin associated with right cvat....h/o kleb uti ......sedc rate 14 on ....bm 2-4x/d, x 2 mos....sp cs...appy....ho mrsa pneumonia w intubation and blood transfusion 2 yrs....        a-pelvic p[ain, dysmenorrhea, urinary frequency and urgency....poss renal etiology...r/o gb and renal dis        p-renal and gb us...ucs.....rtc 1 wk.....consider dx lap     GYN Visit & History2016     Pelvic pain: intermittent    Contraceptive counseling    urinary tract infection        Plan: Finish Keflex.    Pt did not take doxy as prescribed.    Recommended to take the full 10 days.    Reviewed u/s and labs with patient.    Track pain, continue OCP.    Return for follow up appointment in 2 months for follow up with Dr Horan or sooner if needed.    Terazol esent if needed for yeast r/t prolonged antibiotic use.     GYN Visit & History2016     27 yo........pp x 2mos.....ocp 2-3 wk....cramping w sharp exacerbagtion...right great than  left...no n/v...post coital pelvic pain....fever x 1 d...pos appy 10 yr ago        a-pelvic pain....vag dc....        p-gc chl affirm ucs cbc sed rate...doxy 100 bid x 10...us....rtc 1 w     GYN Exam (Annual/6Wk PP)1012/9/2015     Annual    Obesity        Plan: Pap with gc/ct    Adipex refilled    Zyrtec refilled.    Diet and exercise discussed.    Return for follow up appointment one year or prn.     GYN Visit & F/U1/6/2015     Endometritis, resolved.  Obesity.  Family history of breast cancer.    Bilateral breast ultrasound at [facility].    Prescription for Contrave.    Return to clinic in one month.     GYN Exam (Annual/6Wk PP)1012/11/2014     Annual exam.  Contraceptive management.  Strong family history of breast cancer.  Right breast cyst.  Endometritis.    CBC and sed.  Rate today.    Change birth control to Mircette 1 by mouth daily.    Doxycycline and Flagyl x2 weeks.    Ultrasound with radiology of bilateral breasts.    BRCA gene testing.     GYN Visit & F/U9/9/2014     Irregular menstrual cycle        Plan: Beta today    If starts menses, restart OCP.    Condom use.    Return for follow up appointment prn pending results.     GYN Visit & F/U5/5/2014     Yeast vulvovaginitis    Recent MRSA pneumonia, S/P 3 week ICU stay        Plan: Diflucan 150mg daily #1    Mycolog ointment esent    Continue PT and follow up with PCP as scheduled    Return for follow up appointment prn.     GYN Visit & F/U1/31/2014     Bacterial Vaginosis        Plan: Flagyl 500mg I PO BID, pt states she does have rx at home    Repeat diflucan.    Pt has no insurance at this time.        Return for follow up appointment prn.     GYN Visit & F/U11/19/2013     co uri...on tricycle....        o- heent adenopathy.....cx clean        a-sp luz maria 1 ..uri        p-pap...amp 500 tid.....rtc 6 mo p/p....     GYN Visit & F/U5/7/2013     cx bx hpv w/o dysplasia.....no tob...rtc 6 mos pap...Gardasil at hd     DAY GYN Visit +  History2013     colpo-prob luz maria 2.....bx at 600/900.........rtc 1 wk     Social Determinants of Health     Financial Resource Strain: Low Risk  (3/26/2024)    Overall Financial Resource Strain (CARDIA)     Difficulty of Paying Living Expenses: Not hard at all   Food Insecurity: No Food Insecurity (3/26/2024)    Hunger Vital Sign     Worried About Running Out of Food in the Last Year: Never true     Ran Out of Food in the Last Year: Never true   Transportation Needs: No Transportation Needs (3/26/2024)    PRAPARE - Transportation     Lack of Transportation (Medical): No     Lack of Transportation (Non-Medical): No   Physical Activity: Inactive (3/26/2024)    Exercise Vital Sign     Days of Exercise per Week: 0 days     Minutes of Exercise per Session: 0 min   Stress: Stress Concern Present (3/26/2024)    [employer] - Occupational Stress Questionnaire     Feeling of Stress : Rather much   Housing Stability: Low Risk  (3/26/2024)    Housing Stability Vital Sign     Unable to Pay for Housing in the Last Year: No     Number of Places Lived in the Last Year: 1     Unstable Housing in the Last Year: No     Past Surgical History:   Procedure Laterality Date    APPENDECTOMY      BRONCHOSCOPY       SECTION, LOW TRANSVERSE      DILATION AND CURETTAGE OF UTERUS N/A 2020    Procedure: DILATION AND CURETTAGE, UTERUS;  Surgeon: Srinivasan Stewart MD;  Location: [facility] OR;  Service: OB/GYN;  Laterality: N/A;    HYSTEROSCOPIC POLYPECTOMY OF UTERUS N/A 2020    Procedure: POLYPECTOMY, UTERUS, HYSTEROSCOPIC;  Surgeon: Srinivasan Stewart MD;  Location: [facility] OR;  Service: OB/GYN;  Laterality: N/A;    HYSTEROSCOPY N/A 2020    Procedure: HYSTEROSCOPY;  Surgeon: Srinivasan Stewart MD;  Location: [facility] OR;  Service: OB/GYN;  Laterality: N/A;    TONSILLECTOMY       Family History   Problem Relation Name Age of Onset    COPD Mother      Asthma Mother      Heart disease Mother      Heart  disease Brother      Heart disease Maternal Grandfather      Diabetes Paternal Grandmother      Heart disease Paternal Grandmother      Breast cancer Paternal Aunt      Breast cancer Paternal Aunt      Breast cancer Paternal Cousin      Immunodeficiency Neg Hx      Eczema Neg Hx      Rhinitis Neg Hx         All of your core healthy metrics are met.      Review of patient's allergies indicates:   Allergen Reactions    Azithromycin Hives    Bactrim [sulfamethoxazole-trimethoprim]      Rash     Ciprofloxacin      Doesn't remember ? Rash     Clindamycin Hives    Doxycycline Hives    Naltrexone-bupropion Nausea And Vomiting and Other (See Comments)     hands and face numbness    Penicillins Rash     Tiny raised bumps. Not urticarial- 10 years.        Current Outpatient Medications:     triamcinolone acetonide 0.1% (KENALOG) 0.1 % paste, SMARTSIG:By Mouth As Directed, Disp: , Rfl:     zolpidem (AMBIEN) 10 mg Tab, Take 10 mg by mouth nightly as needed., Disp: , Rfl:     ascorbic acid, vitamin C, (VITAMIN C) 1000 MG tablet, Take 1,000 mg by mouth., Disp: , Rfl:     aspirin (ECOTRIN) 81 MG EC tablet, Take 81 mg by mouth once daily., Disp: , Rfl:     cariprazine (VRAYLAR) 1.5 mg Cap, Take 1 capsule (1.5 mg total) by mouth Daily., Disp: 30 capsule, Rfl: 2    cinnamon bark 500 mg capsule, Take 500 mg by mouth once daily., Disp: , Rfl:     clonazePAM (KLONOPIN) 0.5 MG tablet, Take 1 tablet (0.5 mg total) by mouth 2 (two) times daily as needed for Anxiety., Disp: 60 tablet, Rfl: 5    EScitalopram oxalate (LEXAPRO) 10 MG tablet, Take 1 tablet (10 mg total) by mouth once daily., Disp: 90 tablet, Rfl: 3    fluticasone propionate (FLONASE) 50 mcg/actuation nasal spray, 2 sprays (100 mcg total) by Each Nostril route once daily., Disp: 18.2 mL, Rfl: 2    levocetirizine (XYZAL) 5 MG tablet, Take 1 tablet (5 mg total) by mouth once daily., Disp: 30 tablet, Rfl: 2    metoprolol succinate (TOPROL-XL) 25 MG 24 hr tablet, Take 0.5 tablets  (12.5 mg total) by mouth once daily., Disp: 45 tablet, Rfl: 5    nitrofurantoin, macrocrystal-monohydrate, (MACROBID) 100 MG capsule, Take 1 capsule (100 mg total) by mouth 2 (two) times daily. for 5 days, Disp: 10 capsule, Rfl: 0    norethindrone-ethinyl estradiol (JUNEL FE 1/20) 1 mg-20 mcg (21)/75 mg (7) per tablet, Take 1 tablet by mouth., Disp: , Rfl:     norgestimate-ethinyl estradioL (ORTHO TRI-CYCLEN LO) 0.18/0.215/0.25 mg-25 mcg tablet, Take 1 tablet by mouth once daily., Disp: , Rfl:     tirzepatide (MOUNJARO) 5 mg/0.5 mL PnIj, Inject 5 mg into the skin every 7 days., Disp: 2 mL, Rfl: 0    UNABLE TO FIND, Take 1 tablet by mouth once daily. Methyl-balance, Disp: , Rfl:     UNABLE TO FIND, Take 4 capsules by mouth once daily. emery & Dchiro inositol, Disp: , Rfl:     UNABLE TO FIND, Take 2 capsules by mouth once daily. Calm  Sleep capsules, Disp: , Rfl:     vitamin D (VITAMIN D3) 1000 units Tab, Take 1,000 Units by mouth once daily., Disp: , Rfl:     vitamin E 400 UNIT capsule, Take 400 Units by mouth once daily., Disp: , Rfl:     zinc gluconate 50 mg tablet, Take 50 mg by mouth., Disp: , Rfl:   No current facility-administered medications for this visit.    Review of Systems   Constitutional:  Negative for chills, fever and unexpected weight change.   HENT:  Negative for ear pain, rhinorrhea and sore throat.    Eyes:  Negative for pain and visual disturbance.   Respiratory:  Negative for cough and shortness of breath.    Cardiovascular:  Negative for chest pain, palpitations and leg swelling.   Gastrointestinal:  Negative for abdominal pain, diarrhea, nausea and vomiting.   Genitourinary:  Negative for difficulty urinating, hematuria and vaginal bleeding.   Musculoskeletal:  Negative for arthralgias.   Skin:  Negative for rash.   Neurological:  Negative for dizziness, weakness and headaches.   Psychiatric/Behavioral:  Positive for depression. Negative for agitation and sleep disturbance. The patient is not  "nervous/anxious.           Objective:      Vitals:    04/29/24 1157   BP: 136/82   Pulse: 84   Weight: 108.9 kg (240 lb)   Height: 5' 8" (1.727 m)     Physical Exam  Vitals and nursing note reviewed.   Constitutional:       General: She is not in acute distress.     Appearance: Normal appearance. She is well-developed. She is obese.   HENT:      Head: Normocephalic.      Right Ear: External ear normal.      Left Ear: External ear normal.   Eyes:      Conjunctiva/sclera: Conjunctivae normal.      Pupils: Pupils are equal, round, and reactive to light.   Neck:      Vascular: No JVD.   Cardiovascular:      Rate and Rhythm: Normal rate and regular rhythm.      Heart sounds: No murmur heard.  Pulmonary:      Effort: Pulmonary effort is normal.      Breath sounds: Normal breath sounds.   Abdominal:      General: Bowel sounds are normal.      Palpations: Abdomen is soft.   Musculoskeletal:         General: No deformity. Normal range of motion.      Cervical back: Normal range of motion and neck supple.   Lymphadenopathy:      Cervical: No cervical adenopathy.   Skin:     General: Skin is warm and dry.      Findings: No rash.   Neurological:      Mental Status: She is alert and oriented to person, place, and time.      Gait: Gait normal.   Psychiatric:         Mood and Affect: Affect normal. Mood is depressed. Affect is not tearful.         Speech: Speech normal.         Behavior: Behavior normal.      Comments: Affect has improved           Assessment:       1. Hypoglycemia    2. Obesity (BMI 30-39.9)    3. Grief    4. Insomnia, unspecified type    5. PCOS (polycystic ovarian syndrome)    6. Anxiety         Plan:       Hypoglycemia  Comments:  hga1c 5.6  Orders:  -     POCT HEMOGLOBIN A1C    Obesity (BMI 30-39.9)  Comments:  zepbound. diet and exericse  Orders:  -     tirzepatide (MOUNJARO) 5 mg/0.5 mL PnIj; Inject 5 mg into the skin every 7 days.  Dispense: 2 mL; Refill: 0    Grief  Comments:  continue " therapy    Insomnia, unspecified type  Comments:  ambien    PCOS (polycystic ovarian syndrome)    Anxiety  Comments:  lexapro      Follow up in about 6 weeks (around 6/10/2024), or if symptoms worsen or fail to improve, for medication management.        5/1/2024 Peace Gray

## 2024-05-06 ENCOUNTER — PATIENT MESSAGE (OUTPATIENT)
Dept: FAMILY MEDICINE | Facility: CLINIC | Age: 34
End: 2024-05-06
Payer: COMMERCIAL

## 2024-05-07 LAB
ALBUMIN SERPL-MCNC: 4.4 G/DL (ref 3.6–5.1)
ALBUMIN/GLOB SERPL: 1.6 (CALC) (ref 1–2.5)
ALP SERPL-CCNC: 66 U/L (ref 31–125)
ALT SERPL-CCNC: 42 U/L (ref 6–29)
APPEARANCE UR: CLEAR
AST SERPL-CCNC: 30 U/L (ref 10–30)
BACTERIA #/AREA URNS HPF: NORMAL /HPF
BACTERIA UR CULT: NORMAL
BASOPHILS # BLD AUTO: 64 CELLS/UL (ref 0–200)
BASOPHILS NFR BLD AUTO: 0.7 %
BILIRUB SERPL-MCNC: 0.4 MG/DL (ref 0.2–1.2)
BILIRUB UR QL STRIP: NEGATIVE
BUN SERPL-MCNC: 10 MG/DL (ref 7–25)
BUN/CREAT SERPL: ABNORMAL (CALC) (ref 6–22)
CALCIUM SERPL-MCNC: 9.7 MG/DL (ref 8.6–10.2)
CHLORIDE SERPL-SCNC: 103 MMOL/L (ref 98–110)
CHOLEST SERPL-MCNC: 198 MG/DL
CHOLEST/HDLC SERPL: 4.1 (CALC)
CO2 SERPL-SCNC: 27 MMOL/L (ref 20–32)
COLOR UR: YELLOW
CREAT SERPL-MCNC: 0.57 MG/DL (ref 0.5–0.97)
EGFR: 123 ML/MIN/1.73M2
EOSINOPHIL # BLD AUTO: 478 CELLS/UL (ref 15–500)
EOSINOPHIL NFR BLD AUTO: 5.2 %
ERYTHROCYTE [DISTWIDTH] IN BLOOD BY AUTOMATED COUNT: 12.7 % (ref 11–15)
GLOBULIN SER CALC-MCNC: 2.8 G/DL (CALC) (ref 1.9–3.7)
GLUCOSE SERPL-MCNC: 80 MG/DL (ref 65–99)
GLUCOSE UR QL STRIP: NEGATIVE
HCT VFR BLD AUTO: 43.3 % (ref 35–45)
HDLC SERPL-MCNC: 48 MG/DL
HGB BLD-MCNC: 14.2 G/DL (ref 11.7–15.5)
HGB UR QL STRIP: NEGATIVE
HYALINE CASTS #/AREA URNS LPF: NORMAL /LPF
KETONES UR QL STRIP: NEGATIVE
LDLC SERPL CALC-MCNC: 121 MG/DL (CALC)
LEUKOCYTE ESTERASE UR QL STRIP: NEGATIVE
LYMPHOCYTES # BLD AUTO: 3386 CELLS/UL (ref 850–3900)
LYMPHOCYTES NFR BLD AUTO: 36.8 %
MCH RBC QN AUTO: 28.6 PG (ref 27–33)
MCHC RBC AUTO-ENTMCNC: 32.8 G/DL (ref 32–36)
MCV RBC AUTO: 87.3 FL (ref 80–100)
MONOCYTES # BLD AUTO: 552 CELLS/UL (ref 200–950)
MONOCYTES NFR BLD AUTO: 6 %
NEUTROPHILS # BLD AUTO: 4720 CELLS/UL (ref 1500–7800)
NEUTROPHILS NFR BLD AUTO: 51.3 %
NITRITE UR QL STRIP: NEGATIVE
NONHDLC SERPL-MCNC: 150 MG/DL (CALC)
PH UR STRIP: 5.5 [PH] (ref 5–8)
PLATELET # BLD AUTO: 357 THOUSAND/UL (ref 140–400)
PMV BLD REES-ECKER: 10.9 FL (ref 7.5–12.5)
POTASSIUM SERPL-SCNC: 4.3 MMOL/L (ref 3.5–5.3)
PROT SERPL-MCNC: 7.2 G/DL (ref 6.1–8.1)
PROT UR QL STRIP: NEGATIVE
RBC # BLD AUTO: 4.96 MILLION/UL (ref 3.8–5.1)
RBC #/AREA URNS HPF: NORMAL /HPF
SERVICE CMNT-IMP: NORMAL
SODIUM SERPL-SCNC: 138 MMOL/L (ref 135–146)
SP GR UR STRIP: 1.02 (ref 1–1.03)
SQUAMOUS #/AREA URNS HPF: NORMAL /HPF
TRIGL SERPL-MCNC: 171 MG/DL
TSH SERPL-ACNC: 2.2 MIU/L
WBC # BLD AUTO: 9.2 THOUSAND/UL (ref 3.8–10.8)
WBC #/AREA URNS HPF: NORMAL /HPF

## 2024-05-07 RX ORDER — ZOLPIDEM TARTRATE 10 MG/1
10 TABLET ORAL NIGHTLY PRN
Qty: 30 TABLET | Refills: 1 | Status: SHIPPED | OUTPATIENT
Start: 2024-05-07

## 2024-05-07 NOTE — TELEPHONE ENCOUNTER
Cholesterol increased. Needs to start low cholesterol diet. Liver enzymes improved . 1 does remain minimally elevated.   Rest of labs ok.   Can you check on mounjaro?

## 2024-05-08 NOTE — ADDENDUM NOTE
2000 ttr levophed gtt to maintain sbp >90. Hr elevated to 150's. ttr propofol for sedation as pt's bp tolerates. pt awakens at times and nods head to questions but also appears restless and agitated, grabbing at gown/lines. Assessment as noted. Low grade temp. Extremities cool and dusky with dopplerable pedal pulses. 2020 dr Muriel Dee paged and updated on hr 156. No new orders at this time. 0000 no acute changes. 9211 labs drawn and sent. Addended by: PRATIBHA SIERRA on: 5/7/2024 11:20 PM     Modules accepted: Orders

## 2024-05-09 RX ORDER — TIRZEPATIDE 2.5 MG/.5ML
2.5 INJECTION, SOLUTION SUBCUTANEOUS
Qty: 2 ML | Refills: 2 | Status: SHIPPED | OUTPATIENT
Start: 2024-05-09 | End: 2024-06-05

## 2024-05-29 ENCOUNTER — TELEPHONE (OUTPATIENT)
Dept: PSYCHIATRY | Facility: CLINIC | Age: 34
End: 2024-05-29
Payer: COMMERCIAL

## 2024-05-29 NOTE — TELEPHONE ENCOUNTER
Called to verify patient would like to be placed on the med management wait list. No answer, left voice message, sent my chart message.

## 2024-05-30 RX ORDER — METOPROLOL SUCCINATE 25 MG/1
12.5 TABLET, EXTENDED RELEASE ORAL DAILY
Qty: 45 TABLET | Refills: 5 | Status: SHIPPED | OUTPATIENT
Start: 2024-05-30 | End: 2025-05-30

## 2024-05-31 ENCOUNTER — PATIENT MESSAGE (OUTPATIENT)
Dept: FAMILY MEDICINE | Facility: CLINIC | Age: 34
End: 2024-05-31
Payer: COMMERCIAL

## 2024-05-31 DIAGNOSIS — F33.42 RECURRENT MAJOR DEPRESSIVE DISORDER, IN FULL REMISSION: Primary | ICD-10-CM

## 2024-06-05 ENCOUNTER — OFFICE VISIT (OUTPATIENT)
Dept: FAMILY MEDICINE | Facility: CLINIC | Age: 34
End: 2024-06-05
Payer: COMMERCIAL

## 2024-06-05 VITALS
WEIGHT: 247.13 LBS | DIASTOLIC BLOOD PRESSURE: 76 MMHG | SYSTOLIC BLOOD PRESSURE: 118 MMHG | OXYGEN SATURATION: 97 % | HEART RATE: 91 BPM | BODY MASS INDEX: 37.46 KG/M2 | HEIGHT: 68 IN

## 2024-06-05 DIAGNOSIS — F41.9 ANXIETY: ICD-10-CM

## 2024-06-05 DIAGNOSIS — F32.A DEPRESSION, UNSPECIFIED DEPRESSION TYPE: ICD-10-CM

## 2024-06-05 DIAGNOSIS — E66.9 OBESITY, UNSPECIFIED CLASSIFICATION, UNSPECIFIED OBESITY TYPE, UNSPECIFIED WHETHER SERIOUS COMORBIDITY PRESENT: Primary | ICD-10-CM

## 2024-06-05 DIAGNOSIS — F43.21 GRIEF: ICD-10-CM

## 2024-06-05 DIAGNOSIS — E66.9 OBESITY (BMI 30-39.9): ICD-10-CM

## 2024-06-05 DIAGNOSIS — G47.00 INSOMNIA, UNSPECIFIED TYPE: ICD-10-CM

## 2024-06-05 DIAGNOSIS — K21.9 GASTROESOPHAGEAL REFLUX DISEASE, UNSPECIFIED WHETHER ESOPHAGITIS PRESENT: ICD-10-CM

## 2024-06-05 PROCEDURE — 99214 OFFICE O/P EST MOD 30 MIN: CPT | Mod: S$GLB,,, | Performed by: NURSE PRACTITIONER

## 2024-06-05 PROCEDURE — 3074F SYST BP LT 130 MM HG: CPT | Mod: CPTII,S$GLB,, | Performed by: NURSE PRACTITIONER

## 2024-06-05 PROCEDURE — 3044F HG A1C LEVEL LT 7.0%: CPT | Mod: CPTII,S$GLB,, | Performed by: NURSE PRACTITIONER

## 2024-06-05 PROCEDURE — 1160F RVW MEDS BY RX/DR IN RCRD: CPT | Mod: CPTII,S$GLB,, | Performed by: NURSE PRACTITIONER

## 2024-06-05 PROCEDURE — 3078F DIAST BP <80 MM HG: CPT | Mod: CPTII,S$GLB,, | Performed by: NURSE PRACTITIONER

## 2024-06-05 PROCEDURE — 1159F MED LIST DOCD IN RCRD: CPT | Mod: CPTII,S$GLB,, | Performed by: NURSE PRACTITIONER

## 2024-06-05 PROCEDURE — 3008F BODY MASS INDEX DOCD: CPT | Mod: CPTII,S$GLB,, | Performed by: NURSE PRACTITIONER

## 2024-06-05 RX ORDER — TIRZEPATIDE 5 MG/.5ML
5 INJECTION, SOLUTION SUBCUTANEOUS
Qty: 2 ML | Refills: 0 | Status: SHIPPED | OUTPATIENT
Start: 2024-06-05

## 2024-06-05 RX ORDER — BUPROPION HYDROCHLORIDE 150 MG/1
150 TABLET ORAL DAILY
Qty: 30 TABLET | Refills: 11 | Status: SHIPPED | OUTPATIENT
Start: 2024-06-05 | End: 2025-06-05

## 2024-06-05 NOTE — PROGRESS NOTES
SUBJECTIVE:    Patient ID: Oneida Ingram is a 34 y.o. female.    Chief Complaint: Follow-up (No bottles // no refills needed // -ERL)    34-year-old female presents for urgent follow up. She has a PMHx of Anxiety, MDD, and PCOS add. Has been seeing and therapist x several months. We are working to improve depression. We did  Increase her vyralar to daily. Reports feels terrible. Adames snot want to get up again. Irritable . Trouble focusing.  still seeing therapist. Increased frequency of visits to weekly.  Has ov with psychiatrist scheduled. No homicidal/suicidal ideations  Has been experiencing weight gain. She is not sure if related to any of the meds. Wants to discuss options    DepressionPatient presents with the following symptoms: decreased concentration and nervousness/anxiety.  Patient is not experiencing: palpitations and shortness of breath.      Follow-up  Pertinent negatives include no abdominal pain, arthralgias, chest pain, chills, coughing, fever, headaches, nausea, rash, sore throat, vomiting or weakness.       Office Visit on 04/30/2024   Component Date Value Ref Range Status    POC Blood, Urine 04/30/2024 Positive (A)  Negative Final    POC Bilirubin, Urine 04/30/2024 Positive (A)  Negative Final    POC Urobilinogen, Urine 04/30/2024 NORM  0.1 - 1.1 Final    POC Ketones, Urine 04/30/2024 Negative  Negative Final    POC Protein, Urine 04/30/2024 Positive (A)  Negative Final    POC Nitrates, Urine 04/30/2024 Negative  Negative Final    POC Glucose, Urine 04/30/2024 Negative  Negative Final    pH, UA 04/30/2024 6.0   Final    POC Specific Gravity, Urine 04/30/2024 1.030 (A)  1.003 - 1.029 Final    POC Leukocytes, Urine 04/30/2024 Negative  Negative Final    POC Preg Test, Ur 04/30/2024 Negative  Negative Final     Acceptable 04/30/2024 Yes   Final   Office Visit on 04/29/2024   Component Date Value Ref Range Status    Hemoglobin A1C, POC 04/29/2024 5.6  % Final   Telephone on 03/14/2024    Component Date Value Ref Range Status    WBC 05/06/2024 9.2  3.8 - 10.8 Thousand/uL Final    RBC 05/06/2024 4.96  3.80 - 5.10 Million/uL Final    Hemoglobin 05/06/2024 14.2  11.7 - 15.5 g/dL Final    Hematocrit 05/06/2024 43.3  35.0 - 45.0 % Final    MCV 05/06/2024 87.3  80.0 - 100.0 fL Final    MCH 05/06/2024 28.6  27.0 - 33.0 pg Final    MCHC 05/06/2024 32.8  32.0 - 36.0 g/dL Final    RDW 05/06/2024 12.7  11.0 - 15.0 % Final    Platelets 05/06/2024 357  140 - 400 Thousand/uL Final    MPV 05/06/2024 10.9  7.5 - 12.5 fL Final    Neutrophils, Abs 05/06/2024 4,720  1,500 - 7,800 cells/uL Final    Lymph # 05/06/2024 3,386  850 - 3,900 cells/uL Final    Mono # 05/06/2024 552  200 - 950 cells/uL Final    Eos # 05/06/2024 478  15 - 500 cells/uL Final    Baso # 05/06/2024 64  0 - 200 cells/uL Final    Neutrophils Relative 05/06/2024 51.3  % Final    Lymph % 05/06/2024 36.8  % Final    Mono % 05/06/2024 6.0  % Final    Eosinophil % 05/06/2024 5.2  % Final    Basophil % 05/06/2024 0.7  % Final    Glucose 05/06/2024 80  65 - 99 mg/dL Final    BUN 05/06/2024 10  7 - 25 mg/dL Final    Creatinine 05/06/2024 0.57  0.50 - 0.97 mg/dL Final    eGFR 05/06/2024 123  > OR = 60 mL/min/1.73m2 Final    BUN/Creatinine Ratio 05/06/2024 SEE NOTE:  6 - 22 (calc) Final    Sodium 05/06/2024 138  135 - 146 mmol/L Final    Potassium 05/06/2024 4.3  3.5 - 5.3 mmol/L Final    Chloride 05/06/2024 103  98 - 110 mmol/L Final    CO2 05/06/2024 27  20 - 32 mmol/L Final    Calcium 05/06/2024 9.7  8.6 - 10.2 mg/dL Final    Total Protein 05/06/2024 7.2  6.1 - 8.1 g/dL Final    Albumin 05/06/2024 4.4  3.6 - 5.1 g/dL Final    Globulin, Total 05/06/2024 2.8  1.9 - 3.7 g/dL (calc) Final    Albumin/Globulin Ratio 05/06/2024 1.6  1.0 - 2.5 (calc) Final    Total Bilirubin 05/06/2024 0.4  0.2 - 1.2 mg/dL Final    Alkaline Phosphatase 05/06/2024 66  31 - 125 U/L Final    AST 05/06/2024 30  10 - 30 U/L Final    ALT 05/06/2024 42 (H)  6 - 29 U/L Final     Cholesterol 05/06/2024 198  <200 mg/dL Final    HDL 05/06/2024 48 (L)  > OR = 50 mg/dL Final    Triglycerides 05/06/2024 171 (H)  <150 mg/dL Final    LDL Cholesterol 05/06/2024 121 (H)  mg/dL (calc) Final    HDL/Cholesterol Ratio 05/06/2024 4.1  <5.0 (calc) Final    Non HDL Chol. (LDL+VLDL) 05/06/2024 150 (H)  <130 mg/dL (calc) Final    TSH w/reflex to FT4 05/06/2024 2.20  mIU/L Final    Color, UA 05/06/2024 YELLOW  YELLOW Final    Appearance, UA 05/06/2024 CLEAR  CLEAR Final    Specific Gravity, UA 05/06/2024 1.022  1.001 - 1.035 Final    pH, UA 05/06/2024 5.5  5.0 - 8.0 Final    Glucose, UA 05/06/2024 NEGATIVE  NEGATIVE Final    Bilirubin, UA 05/06/2024 NEGATIVE  NEGATIVE Final    Ketones, UA 05/06/2024 NEGATIVE  NEGATIVE Final    Occult Blood UA 05/06/2024 NEGATIVE  NEGATIVE Final    Protein, UA 05/06/2024 NEGATIVE  NEGATIVE Final    Nitrite, UA 05/06/2024 NEGATIVE  NEGATIVE Final    Leukocytes, UA 05/06/2024 NEGATIVE  NEGATIVE Final    WBC Casts, UA 05/06/2024 NONE SEEN  < OR = 5 /HPF Final    RBC Casts, UA 05/06/2024 NONE SEEN  < OR = 2 /HPF Final    Squam Epithel, UA 05/06/2024 NONE SEEN  < OR = 5 /HPF Final    Bacteria, UA 05/06/2024 NONE SEEN  NONE SEEN /HPF Final    Hyaline Casts, UA 05/06/2024 NONE SEEN  NONE SEEN /LPF Final    Service Cmt: 05/06/2024    Final    Reflexive Urine Culture 05/06/2024    Final   Patient Outreach on 12/29/2023   Component Date Value Ref Range Status    CRC Recommendation External 12/27/2023 No follow-up frequency specified   Final    PAP Recommendation External 06/12/2023 Cotesting in 5 years   Final    Pap 06/12/2023 Epithelial cell abnormality (A)  Negative for intraephithelial lesion or malignancy, Other Final    HPV DNA 06/12/2023 None Detected  None Detected Final       Past Medical History:   Diagnosis Date    Anxiety     MRSA pneumonia     Pneumonia      Social History     Socioeconomic History    Marital status:    Tobacco Use    Smoking status: Never     Smokeless tobacco: Never   Substance and Sexual Activity    Alcohol use: Yes     Comment: socially    Drug use: No    Sexual activity: Yes     Partners: Male     Birth control/protection: None   Social History Narrative    Plans from [facility]         [facility]     [facility]    PCOS        Visit Summary:    Down 5 pounds    UDS/ reviewed    Reviewed labs with patient    Discussed diet and exercise        Prescriptions:    SIG: metformin 500 mg oral tablet, 30 days, Dispense #120 Tablet, 5 Refills    Directions: take once daily for 2 weeks then increase to BID for 2 weeks, then increase to 2 po BID    SIG: phentermine 37.5 mg oral tablet, 30 days, Dispense #30 Tablet, 0 Refills    Directions: Take 1 oral tablet once a day        Plan:    Return for follow up appointment in 1 month        Nancy Saravia NP     GYN Visit & History 10 [facility]0/[facility]     Obesity    Irregular menses    hx of ovarian cysts        Visit Summary:    PCOS panel 1 week prior to return appointment    UDS/ reviewed    Discussed diet and exercise at length        Prescriptions:    SIG: phentermine 37.5 mg oral tablet, 30 days, Dispense #30 Tablet, 0 Refills    Directions: Take 1 oral tablet once a day    SIG: Prenatal 28 mg iron- 800 mcg oral tablet, 30 days, Dispense #30 Tablet, 0 Refills    Directions: Take 1 oral tablet once a day        Plan:    Return for follow up appointment in1 month    Weight loss goal set for 8-10 pounds        Nancy Saravia NP     GYN Exam (Annual/6Wk PP)10 [facility]2/13/2016     Annual    Depression/Anxiety    Obesity    Break-through bleeding with OCP        Visit Summary    Ordered:    Blood Drawing    Pap IG, Ct-Ng, rfx HPV ASCU    CBC With Differential/Platelet    Glucose, Serum    Lipid Panel    UA w/o Micro: within normal limits    UPT: negative        Prescriptions:    SIG: NuvaRing 0.12-0.015 mg/24 hr ring,  days, Dispense #1 Ring, 3 Refills    Directions: one ring vaginally x  21 days, then remove and discard. Leave out for 7 days, then insert new ring.    Zoloft working for depression management.    Pt has a PCP for depression management, discussed counseling, exercise etc.        Return for follow up appointment in one year or prn.     [id number]     [facility] k pneumonia. w same sensitivies as k pneumonia uti in .........gb us nl...renal us nl....co persisatnt pp...and frequency and urgency and nocgturia.....modeerat...x 1 y        a-relapsing k npuemonia uti,,..diarrhea x 1 mo            p-cipro 500 mg bid x 10 d...rtc 3 w for repaeat ucs and if still [prese nt will refer to dr mccallum......stool for ova and parasite and c deficil     GYN Visit & [id number]2016     27 yo........daily cramping in pelvis ...moderate....intermittendt and daily....duration 1 h....not worse w activity...u/s nl...on ocp.....associated w dysmen...requires advil and improves w nsaia....no dysp...no dysuria....frequency and urgency..x 1 yr, not worsening, but w apin associated with right cvat....h/o kleb uti ......sedc rate 14 on ....bm 2-4x/d, x 2 mos....sp cs...appy....ho mrsa pneumonia w intubation and blood transfusion 2 yrs....        a-pelvic p[ain, dysmenorrhea, urinary frequency and urgency....poss renal etiology...r/o gb and renal dis        p-renal and gb us...ucs.....rtc 1 wk.....consider dx lap     GYN Visit & History2016     Pelvic pain: intermittent    Contraceptive counseling    urinary tract infection        Plan: Finish Keflex.    Pt did not take doxy as prescribed.    Recommended to take the full 10 days.    Reviewed u/s and labs with patient.    Track pain, continue OCP.    Return for follow up appointment in 2 months for follow up with Dr Horan or sooner if needed.    Terazol esent if needed for yeast r/t prolonged antibiotic use.     GYN Visit & History2016     27 yo........pp x 2mos.....ocp 2-3 wk....cramping w sharp  exacerbagtion...right great than left...no n/v...post coital pelvic pain....fever x 1 d...pos appy 10 yr ago        a-pelvic pain....vag dc....        p-gc chl affirm ucs cbc sed rate...doxy 100 bid x 10...us....rtc 1 w     GYN Exam (Annual/6Wk PP)1012/9/2015     Annual    Obesity        Plan: Pap with gc/ct    Adipex refilled    Zyrtec refilled.    Diet and exercise discussed.    Return for follow up appointment one year or prn.     GYN Visit & F/U1/6/2015     Endometritis, resolved.  Obesity.  Family history of breast cancer.    Bilateral breast ultrasound at [facility].    Prescription for Contrave.    Return to clinic in one month.     GYN Exam (Annual/6Wk PP)1012/11/2014     Annual exam.  Contraceptive management.  Strong family history of breast cancer.  Right breast cyst.  Endometritis.    CBC and sed.  Rate today.    Change birth control to Mircette 1 by mouth daily.    Doxycycline and Flagyl x2 weeks.    Ultrasound with radiology of bilateral breasts.    BRCA gene testing.     GYN Visit & F/U9/9/2014     Irregular menstrual cycle        Plan: Beta today    If starts menses, restart OCP.    Condom use.    Return for follow up appointment prn pending results.     GYN Visit & F/U5/5/2014     Yeast vulvovaginitis    Recent MRSA pneumonia, S/P 3 week ICU stay        Plan: Diflucan 150mg daily #1    Mycolog ointment esent    Continue PT and follow up with PCP as scheduled    Return for follow up appointment prn.     GYN Visit & F/U1/31/2014     Bacterial Vaginosis        Plan: Flagyl 500mg I PO BID, pt states she does have rx at home    Repeat diflucan.    Pt has no insurance at this time.        Return for follow up appointment prn.     GYN Visit & F/U11/19/2013     co uri...on tricycle....        o- heent adenopathy.....cx clean        a-sp luz maria 1 ..uri        p-pap...amp 500 tid.....rtc 6 mo p/p....     GYN Visit & F/U5/7/2013     cx bx hpv w/o dysplasia.....no tob...rtc 6 mos pap...Gardasil at hd      DAY GYN Visit + History2013     colpo-prob luz maria 2.....bx at 600/900.........rtc 1 wk     Social Determinants of Health     Financial Resource Strain: Low Risk  (3/26/2024)    Overall Financial Resource Strain (CARDIA)     Difficulty of Paying Living Expenses: Not hard at all   Food Insecurity: No Food Insecurity (3/26/2024)    Hunger Vital Sign     Worried About Running Out of Food in the Last Year: Never true     Ran Out of Food in the Last Year: Never true   Transportation Needs: No Transportation Needs (3/26/2024)    PRAPARE - Transportation     Lack of Transportation (Medical): No     Lack of Transportation (Non-Medical): No   Physical Activity: Inactive (3/26/2024)    Exercise Vital Sign     Days of Exercise per Week: 0 days     Minutes of Exercise per Session: 0 min   Stress: Stress Concern Present (3/26/2024)    [place] [employer] - Occupational Stress Questionnaire     Feeling of Stress : Rather much   Housing Stability: Low Risk  (3/26/2024)    Housing Stability Vital Sign     Unable to Pay for Housing in the Last Year: No     Number of Places Lived in the Last Year: 1     Unstable Housing in the Last Year: No     Past Surgical History:   Procedure Laterality Date    APPENDECTOMY      BRONCHOSCOPY       SECTION, LOW TRANSVERSE      DILATION AND CURETTAGE OF UTERUS N/A 2020    Procedure: DILATION AND CURETTAGE, UTERUS;  Surgeon: Srinivasan Stewart MD;  Location: [facility] OR;  Service: OB/GYN;  Laterality: N/A;    HYSTEROSCOPIC POLYPECTOMY OF UTERUS N/A 2020    Procedure: POLYPECTOMY, UTERUS, HYSTEROSCOPIC;  Surgeon: Srinivasan Stewart MD;  Location: [facility] OR;  Service: OB/GYN;  Laterality: N/A;    HYSTEROSCOPY N/A 2020    Procedure: HYSTEROSCOPY;  Surgeon: Srinivasan Stewart MD;  Location: [facility] OR;  Service: OB/GYN;  Laterality: N/A;    TONSILLECTOMY       Family History   Problem Relation Name Age of Onset    COPD Mother      Asthma Mother      Heart  disease Mother      Heart disease Brother      Heart disease Maternal Grandfather      Diabetes Paternal Grandmother      Heart disease Paternal Grandmother      Breast cancer Paternal Aunt      Breast cancer Paternal Aunt      Breast cancer Paternal Cousin      Immunodeficiency Neg Hx      Eczema Neg Hx      Rhinitis Neg Hx         All of your core healthy metrics are met.      Review of patient's allergies indicates:   Allergen Reactions    Azithromycin Hives    Bactrim [sulfamethoxazole-trimethoprim]      Rash     Ciprofloxacin      Doesn't remember ? Rash     Clindamycin Hives    Doxycycline Hives    Naltrexone-bupropion Nausea And Vomiting and Other (See Comments)     hands and face numbness    Penicillins Rash     Tiny raised bumps. Not urticarial- 10 years.        Current Outpatient Medications:     clonazePAM (KLONOPIN) 0.5 MG tablet, Take 1 tablet (0.5 mg total) by mouth 2 (two) times daily as needed for Anxiety., Disp: 60 tablet, Rfl: 5    fluticasone propionate (FLONASE) 50 mcg/actuation nasal spray, 2 sprays (100 mcg total) by Each Nostril route once daily., Disp: 18.2 mL, Rfl: 2    levocetirizine (XYZAL) 5 MG tablet, Take 1 tablet (5 mg total) by mouth once daily., Disp: 30 tablet, Rfl: 2    metoprolol succinate (TOPROL-XL) 25 MG 24 hr tablet, Take 0.5 tablets (12.5 mg total) by mouth once daily., Disp: 45 tablet, Rfl: 5    norethindrone-ethinyl estradiol (JUNEL FE 1/20) 1 mg-20 mcg (21)/75 mg (7) per tablet, Take 1 tablet by mouth., Disp: , Rfl:     vitamin D (VITAMIN D3) 1000 units Tab, Take 1,000 Units by mouth once daily., Disp: , Rfl:     zolpidem (AMBIEN) 10 mg Tab, Take 1 tablet (10 mg total) by mouth nightly as needed., Disp: 30 tablet, Rfl: 1    buPROPion (WELLBUTRIN XL) 150 MG TB24 tablet, Take 1 tablet (150 mg total) by mouth once daily., Disp: 30 tablet, Rfl: 11    EScitalopram oxalate (LEXAPRO) 10 MG tablet, Take 1 tablet (10 mg total) by mouth once daily., Disp: 90 tablet, Rfl: 3     "metFORMIN (GLUCOPHAGE-XR) 500 MG ER 24hr tablet, Take 1 tablet (500 mg total) by mouth daily with breakfast., Disp: 90 tablet, Rfl: 1    pantoprazole (PROTONIX) 40 MG tablet, Take 1 tablet (40 mg total) by mouth once daily., Disp: 90 tablet, Rfl: 3    tirzepatide, weight loss, (ZEPBOUND) 5 mg/0.5 mL PnIj, Inject 5 mg into the skin every 7 days., Disp: 2 mL, Rfl: 0    Review of Systems   Constitutional:  Negative for chills, fever and unexpected weight change.   HENT:  Negative for ear pain, rhinorrhea and sore throat.    Eyes:  Negative for pain and visual disturbance.   Respiratory:  Negative for cough and shortness of breath.    Cardiovascular:  Negative for chest pain, palpitations and leg swelling.   Gastrointestinal:  Negative for abdominal pain, diarrhea, nausea and vomiting.   Genitourinary:  Negative for difficulty urinating, hematuria and vaginal bleeding.   Musculoskeletal:  Negative for arthralgias.   Skin:  Negative for rash.   Neurological:  Negative for dizziness, weakness and headaches.   Psychiatric/Behavioral:  Positive for agitation, decreased concentration and sleep disturbance. The patient is nervous/anxious.           Objective:      Vitals:    06/05/24 1127   BP: 118/76   Pulse: 91   SpO2: 97%   Weight: 112.1 kg (247 lb 2 oz)   Height: 5' 8" (1.727 m)     Physical Exam  Vitals and nursing note reviewed.   Constitutional:       General: She is not in acute distress.     Appearance: Normal appearance. She is well-developed. She is obese.   Neck:      Vascular: No JVD.   Cardiovascular:      Rate and Rhythm: Normal rate and regular rhythm.      Heart sounds: No murmur heard.  Pulmonary:      Effort: Pulmonary effort is normal.      Breath sounds: Normal breath sounds.   Musculoskeletal:         General: No deformity. Normal range of motion.      Cervical back: Normal range of motion and neck supple.   Lymphadenopathy:      Cervical: No cervical adenopathy.   Skin:     General: Skin is warm and " dry.      Findings: No rash.   Neurological:      Mental Status: She is alert and oriented to person, place, and time.      Gait: Gait normal.   Psychiatric:         Speech: Speech normal.         Behavior: Behavior normal.           Assessment:       1. Obesity, unspecified classification, unspecified obesity type, unspecified whether serious comorbidity present    2. Obesity (BMI 30-39.9)    3. Depression, unspecified depression type    4. Anxiety    5. Grief    6. Insomnia, unspecified type    7. Gastroesophageal reflux disease, unspecified whether esophagitis present         Plan:       Obesity, unspecified classification, unspecified obesity type, unspecified whether serious comorbidity present  -     tirzepatide, weight loss, (ZEPBOUND) 5 mg/0.5 mL PnIj; Inject 5 mg into the skin every 7 days.  Dispense: 2 mL; Refill: 0    Obesity (BMI 30-39.9)  Comments:  zepbound. diet and exericse    Depression, unspecified depression type  Comments:  discussed weaning off of vyraylar start wellbutrin  Orders:  -     buPROPion (WELLBUTRIN XL) 150 MG TB24 tablet; Take 1 tablet (150 mg total) by mouth once daily.  Dispense: 30 tablet; Refill: 11    Anxiety  Comments:  scheduled with psych    Grief  Comments:  continue with therapist    Insomnia, unspecified type  Comments:  ambien prn    Gastroesophageal reflux disease, unspecified whether esophagitis present  Comments:  protonix      Follow up in about 4 weeks (around 7/3/2024) for medication management.        6/27/2024 Peace Gray

## 2024-06-06 ENCOUNTER — PATIENT MESSAGE (OUTPATIENT)
Dept: FAMILY MEDICINE | Facility: CLINIC | Age: 34
End: 2024-06-06

## 2024-06-11 RX ORDER — PANTOPRAZOLE SODIUM 40 MG/1
40 TABLET, DELAYED RELEASE ORAL DAILY
Qty: 90 TABLET | Refills: 3 | Status: SHIPPED | OUTPATIENT
Start: 2024-06-11 | End: 2025-06-11

## 2024-06-18 ENCOUNTER — PATIENT MESSAGE (OUTPATIENT)
Dept: FAMILY MEDICINE | Facility: CLINIC | Age: 34
End: 2024-06-18
Payer: COMMERCIAL

## 2024-06-18 RX ORDER — METFORMIN HYDROCHLORIDE 500 MG/1
500 TABLET, EXTENDED RELEASE ORAL
Qty: 90 TABLET | Refills: 1 | Status: SHIPPED | OUTPATIENT
Start: 2024-06-18

## 2024-06-21 ENCOUNTER — TELEPHONE (OUTPATIENT)
Dept: PSYCHIATRY | Facility: CLINIC | Age: 34
End: 2024-06-21
Payer: COMMERCIAL

## 2024-06-27 NOTE — TELEPHONE ENCOUNTER
Called pt regarding below message. Left voicemail with return number.       Unable to reach patient vm x3 and my chart sent. Appointment removed from wait list, referral marked as unable to reach.

## 2024-07-15 ENCOUNTER — PATIENT MESSAGE (OUTPATIENT)
Dept: FAMILY MEDICINE | Facility: CLINIC | Age: 34
End: 2024-07-15
Payer: COMMERCIAL

## 2024-07-16 RX ORDER — BUTALBITAL, ACETAMINOPHEN AND CAFFEINE 50; 325; 40 MG/1; MG/1; MG/1
1 TABLET ORAL EVERY 4 HOURS PRN
Qty: 20 TABLET | Refills: 0 | Status: SHIPPED | OUTPATIENT
Start: 2024-07-16 | End: 2024-08-15

## 2024-07-20 ENCOUNTER — OFFICE VISIT (OUTPATIENT)
Dept: URGENT CARE | Facility: CLINIC | Age: 34
End: 2024-07-20
Payer: COMMERCIAL

## 2024-07-20 VITALS
BODY MASS INDEX: 36.37 KG/M2 | RESPIRATION RATE: 18 BRPM | TEMPERATURE: 98 F | OXYGEN SATURATION: 99 % | HEART RATE: 99 BPM | WEIGHT: 240 LBS | SYSTOLIC BLOOD PRESSURE: 137 MMHG | DIASTOLIC BLOOD PRESSURE: 83 MMHG | HEIGHT: 68 IN

## 2024-07-20 DIAGNOSIS — R50.9 FEVER, UNSPECIFIED FEVER CAUSE: Primary | ICD-10-CM

## 2024-07-20 DIAGNOSIS — U07.1 COVID-19: ICD-10-CM

## 2024-07-20 LAB
CTP QC/QA: YES
SARS-COV-2 AG RESP QL IA.RAPID: POSITIVE

## 2024-07-20 PROCEDURE — 99214 OFFICE O/P EST MOD 30 MIN: CPT | Mod: S$GLB,,, | Performed by: STUDENT IN AN ORGANIZED HEALTH CARE EDUCATION/TRAINING PROGRAM

## 2024-07-20 PROCEDURE — 87811 SARS-COV-2 COVID19 W/OPTIC: CPT | Mod: QW,S$GLB,, | Performed by: STUDENT IN AN ORGANIZED HEALTH CARE EDUCATION/TRAINING PROGRAM

## 2024-07-20 RX ORDER — PROMETHAZINE HYDROCHLORIDE AND DEXTROMETHORPHAN HYDROBROMIDE 6.25; 15 MG/5ML; MG/5ML
5 SYRUP ORAL EVERY 4 HOURS PRN
Qty: 118 ML | Refills: 0 | Status: SHIPPED | OUTPATIENT
Start: 2024-07-20 | End: 2024-07-30

## 2024-07-20 RX ORDER — GUAIFENESIN 600 MG/1
1200 TABLET, EXTENDED RELEASE ORAL 2 TIMES DAILY
Qty: 30 TABLET | Refills: 0 | Status: SHIPPED | OUTPATIENT
Start: 2024-07-20

## 2024-07-20 RX ORDER — DEXTROMETHORPHAN HYDROBROMIDE, BUPROPION HYDROCHLORIDE 105; 45 MG/1; MG/1
1 TABLET, MULTILAYER, EXTENDED RELEASE ORAL
COMMUNITY
Start: 2024-07-15

## 2024-07-20 RX ORDER — LEVOCETIRIZINE DIHYDROCHLORIDE 5 MG/1
5 TABLET, FILM COATED ORAL NIGHTLY
Qty: 30 TABLET | Refills: 0 | Status: SHIPPED | OUTPATIENT
Start: 2024-07-20 | End: 2025-07-20

## 2024-07-20 NOTE — PROGRESS NOTES
"Subjective:      Patient ID: Oneida Ingram is a 34 y.o. female.    Vitals:  height is 5' 8" (1.727 m) and weight is 108.9 kg (240 lb). Her temperature is 98 °F (36.7 °C). Her blood pressure is 137/83 and her pulse is 99. Her respiration is 18 and oxygen saturation is 99%.     Chief Complaint: Sinus Problem, Cough, and Diarrhea    Drainage, fever, cough, sneezing, headache, sinus pressure, and eye drainage. Pt took at home covid test positive. Began with symptoms Monday.    Sinus Problem  This is a new problem. The current episode started in the past 7 days. The problem has been gradually worsening since onset. Associated symptoms include congestion, coughing, headaches, sinus pressure and sneezing. (sweats)       HENT:  Positive for congestion and sinus pressure.    Respiratory:  Positive for cough.    Allergic/Immunologic: Positive for sneezing.   Neurological:  Positive for headaches.      Objective:     Physical Exam   Constitutional: She is oriented to person, place, and time. She appears well-developed. She is cooperative.  Non-toxic appearance. She does not appear ill. No distress.   HENT:   Head: Normocephalic and atraumatic.   Ears:   Right Ear: Hearing, tympanic membrane, external ear and ear canal normal.   Left Ear: Hearing, tympanic membrane, external ear and ear canal normal.   Nose: Rhinorrhea and congestion present. No mucosal edema or nasal deformity. No epistaxis. Right sinus exhibits no maxillary sinus tenderness and no frontal sinus tenderness. Left sinus exhibits no maxillary sinus tenderness and no frontal sinus tenderness.   Mouth/Throat: Uvula is midline, oropharynx is clear and moist and mucous membranes are normal. No trismus in the jaw. Normal dentition. No uvula swelling. No oropharyngeal exudate, posterior oropharyngeal edema or posterior oropharyngeal erythema.   Eyes: Conjunctivae and lids are normal. No scleral icterus.   Neck: Trachea normal and phonation normal. Neck supple. No edema " present. No erythema present. No neck rigidity present.   Cardiovascular: Normal rate, regular rhythm, normal heart sounds and normal pulses.   Pulmonary/Chest: Effort normal and breath sounds normal. No respiratory distress. She has no decreased breath sounds. She has no rhonchi.   Abdominal: Normal appearance.   Musculoskeletal: Normal range of motion.         General: No deformity. Normal range of motion.   Neurological: She is alert and oriented to person, place, and time. She exhibits normal muscle tone. Coordination normal.   Skin: Skin is warm, dry, intact, not diaphoretic and not pale.   Psychiatric: Her speech is normal and behavior is normal. Judgment and thought content normal.   Nursing note and vitals reviewed.      Assessment:     1. Fever, unspecified fever cause    2. COVID-19        Plan:       Fever, unspecified fever cause  -     SARS Coronavirus 2 Antigen, POCT Manual Read    COVID-19    Other orders  -     levocetirizine (XYZAL) 5 MG tablet; Take 1 tablet (5 mg total) by mouth every evening.  Dispense: 30 tablet; Refill: 0  -     guaiFENesin (MUCINEX) 600 mg 12 hr tablet; Take 2 tablets (1,200 mg total) by mouth 2 (two) times daily.  Dispense: 30 tablet; Refill: 0  -     promethazine-dextromethorphan (PROMETHAZINE-DM) 6.25-15 mg/5 mL Syrp; Take 5 mLs by mouth every 4 (four) hours as needed.  Dispense: 118 mL; Refill: 0  -     benzocaine-menthoL 15-3.6 mg Lozg; 1 each by Mucous Membrane route every 4 (four) hours as needed.  Dispense: 18 lozenge; Refill: 0      COVID positive     - To ED for any new or acutely worsening symptoms including but not limited to chest pain, palpitations, shortness of breath, or fever greater than 103° F.  Patient in agreement with plan of care.                - The diagnosis, treatment plan, instructions for follow-up and reevaluation as well as ED precautions were discussed and understanding was verbalized. All questions or concerns have been addressed.

## 2024-07-20 NOTE — PATIENT INSTRUCTIONS
Thankyou for the opportunity to care for you today.  Please take all medications as directed, continue any previous prescribed medications unless we specifically discussed holding them.  If your symptoms do not resolve or worsen please return to the clinic for re-evaluation, if your situation becomes emergent please present to to the nearest emergency department.  Follow-up with your PCP for continued evaluation and management.    You tested positive for COVID-19.  Increase your fluid intake.  You should self quarantine and avoid contact with anyone outside of your household for 24 hours past your last fever, with symptom improvement.

## 2024-09-17 ENCOUNTER — OFFICE VISIT (OUTPATIENT)
Dept: URGENT CARE | Facility: CLINIC | Age: 34
End: 2024-09-17
Payer: COMMERCIAL

## 2024-09-17 VITALS
DIASTOLIC BLOOD PRESSURE: 79 MMHG | TEMPERATURE: 98 F | HEIGHT: 68 IN | HEART RATE: 84 BPM | OXYGEN SATURATION: 98 % | BODY MASS INDEX: 36.37 KG/M2 | SYSTOLIC BLOOD PRESSURE: 144 MMHG | WEIGHT: 240 LBS

## 2024-09-17 DIAGNOSIS — J06.9 BACTERIAL UPPER RESPIRATORY INFECTION: Primary | ICD-10-CM

## 2024-09-17 DIAGNOSIS — B96.89 BACTERIAL UPPER RESPIRATORY INFECTION: Primary | ICD-10-CM

## 2024-09-17 DIAGNOSIS — B96.89 BACTERIAL SKIN INFECTION: ICD-10-CM

## 2024-09-17 DIAGNOSIS — L08.9 BACTERIAL SKIN INFECTION: ICD-10-CM

## 2024-09-17 PROCEDURE — 99214 OFFICE O/P EST MOD 30 MIN: CPT | Mod: S$GLB,,,

## 2024-09-17 RX ORDER — LORATADINE AND PSEUDOEPHEDRINE SULFATE 5; 120 MG/1; MG/1
1 TABLET, EXTENDED RELEASE ORAL 2 TIMES DAILY
Qty: 20 TABLET | Refills: 0 | Status: SHIPPED | OUTPATIENT
Start: 2024-09-17 | End: 2024-09-27

## 2024-09-17 RX ORDER — METHYLPREDNISOLONE 4 MG/1
TABLET ORAL
Qty: 21 EACH | Refills: 0 | Status: SHIPPED | OUTPATIENT
Start: 2024-09-17 | End: 2024-10-08

## 2024-09-17 RX ORDER — CEFDINIR 300 MG/1
300 CAPSULE ORAL 2 TIMES DAILY
Qty: 20 CAPSULE | Refills: 0 | Status: SHIPPED | OUTPATIENT
Start: 2024-09-17 | End: 2024-09-27

## 2024-09-17 RX ORDER — BUSPIRONE HYDROCHLORIDE 10 MG/1
1 TABLET ORAL 3 TIMES DAILY PRN
COMMUNITY
Start: 2024-09-16

## 2024-09-17 NOTE — PROGRESS NOTES
"Subjective:      Patient ID: Oneida Ingram is a 34 y.o. female.    Vitals:  height is 5' 8" (1.727 m) and weight is 108.9 kg (240 lb). Her oral temperature is 98.3 °F (36.8 °C). Her blood pressure is 144/79 (abnormal) and her pulse is 84. Her oxygen saturation is 98%.     Chief Complaint: Sinus Problem    Pt states that she has covid back in July pt states that since then she has been having a sore on the inside of her right nostril area with erythema and pain pt states that she was using bacterin but states that she is continuing to have the pain. Pt states that she has also been having congestion, sinus pressure, nasal drainage, and headache, sore throat (pt states due to nasal drainage). Pt denies any sob, chest pain or other symptoms at this time.     Sinus Problem  This is a new problem. The current episode started 1 to 4 weeks ago. The problem is unchanged. There has been no fever. Associated symptoms include congestion, sinus pressure and a sore throat.       Constitution: Negative.   HENT:  Positive for congestion, postnasal drip, sinus pain, sinus pressure and sore throat.    Neck: neck negative.   Cardiovascular: Negative.    Eyes: Negative.    Respiratory: Negative.     Gastrointestinal: Negative.    Endocrine: negative.   Genitourinary: Negative.    Musculoskeletal: Negative.    Skin: Negative.  Positive for erythema.        Sore noted in right nostril   Allergic/Immunologic: Negative.    Neurological: Negative.    Hematologic/Lymphatic: Negative.    Psychiatric/Behavioral: Negative.        Objective:     Physical Exam   Constitutional: She is oriented to person, place, and time.   HENT:   Head: Normocephalic and atraumatic.   Ears:   Right Ear: Tympanic membrane, external ear and ear canal normal.   Left Ear: Tympanic membrane, external ear and ear canal normal.   Nose: Congestion present.      Comments: Pt has erythema with inflammation noted to right nostril area.   Mouth/Throat: Posterior oropharyngeal " erythema present.   Eyes: Conjunctivae are normal. Pupils are equal, round, and reactive to light. Extraocular movement intact   Neck: Neck supple.   Cardiovascular: Normal rate, regular rhythm, normal heart sounds and normal pulses.   Pulmonary/Chest: Effort normal and breath sounds normal.   Abdominal: Normal appearance.   Musculoskeletal: Normal range of motion.         General: Normal range of motion.   Neurological: no focal deficit. She is alert, oriented to person, place, and time and at baseline.   Skin: Skin is warm. erythema   Psychiatric: Her behavior is normal. Mood, judgment and thought content normal.   Nursing note and vitals reviewed.      Assessment:     1. Bacterial upper respiratory infection    2. Bacterial skin infection        Plan:       Bacterial upper respiratory infection  -     cefdinir (OMNICEF) 300 MG capsule; Take 1 capsule (300 mg total) by mouth 2 (two) times daily. for 10 days  Dispense: 20 capsule; Refill: 0  -     methylPREDNISolone (MEDROL DOSEPACK) 4 mg tablet; use as directed  Dispense: 21 each; Refill: 0  -     loratadine-pseudoephedrine 5-120 mg (CLARITIN-D 12 HOUR) 5-120 mg per tablet; Take 1 tablet by mouth 2 (two) times daily. for 10 days  Dispense: 20 tablet; Refill: 0    Bacterial skin infection  -     cefdinir (OMNICEF) 300 MG capsule; Take 1 capsule (300 mg total) by mouth 2 (two) times daily. for 10 days  Dispense: 20 capsule; Refill: 0  -     methylPREDNISolone (MEDROL DOSEPACK) 4 mg tablet; use as directed  Dispense: 21 each; Refill: 0  -     loratadine-pseudoephedrine 5-120 mg (CLARITIN-D 12 HOUR) 5-120 mg per tablet; Take 1 tablet by mouth 2 (two) times daily. for 10 days  Dispense: 20 tablet; Refill: 0

## 2024-10-14 ENCOUNTER — PATIENT MESSAGE (OUTPATIENT)
Dept: FAMILY MEDICINE | Facility: CLINIC | Age: 34
End: 2024-10-14
Payer: COMMERCIAL

## 2024-10-16 RX ORDER — ZOLPIDEM TARTRATE 10 MG/1
10 TABLET ORAL NIGHTLY PRN
Qty: 30 TABLET | Refills: 0 | Status: SHIPPED | OUTPATIENT
Start: 2024-10-16
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
## 2024-12-22 ENCOUNTER — OFFICE VISIT (OUTPATIENT)
Dept: URGENT CARE | Facility: CLINIC | Age: 34
End: 2024-12-22
Payer: COMMERCIAL

## 2024-12-22 VITALS
HEART RATE: 83 BPM | SYSTOLIC BLOOD PRESSURE: 121 MMHG | HEIGHT: 68 IN | RESPIRATION RATE: 16 BRPM | WEIGHT: 240 LBS | OXYGEN SATURATION: 98 % | TEMPERATURE: 98 F | DIASTOLIC BLOOD PRESSURE: 85 MMHG | BODY MASS INDEX: 36.37 KG/M2

## 2024-12-22 DIAGNOSIS — J06.9 UPPER RESPIRATORY TRACT INFECTION, UNSPECIFIED TYPE: Primary | ICD-10-CM

## 2024-12-22 PROCEDURE — 99214 OFFICE O/P EST MOD 30 MIN: CPT | Mod: S$GLB,,, | Performed by: STUDENT IN AN ORGANIZED HEALTH CARE EDUCATION/TRAINING PROGRAM

## 2024-12-22 RX ORDER — PROMETHAZINE HYDROCHLORIDE AND DEXTROMETHORPHAN HYDROBROMIDE 6.25; 15 MG/5ML; MG/5ML
5 SYRUP ORAL EVERY 4 HOURS PRN
Qty: 118 ML | Refills: 0 | Status: SHIPPED | OUTPATIENT
Start: 2024-12-22 | End: 2025-01-01

## 2024-12-22 RX ORDER — GUAIFENESIN 600 MG/1
1200 TABLET, EXTENDED RELEASE ORAL 2 TIMES DAILY
Qty: 30 TABLET | Refills: 0 | Status: SHIPPED | OUTPATIENT
Start: 2024-12-22

## 2024-12-22 NOTE — PROGRESS NOTES
"Subjective:      Patient ID: Oneida Ingram is a 34 y.o. female.    Vitals:  height is 5' 8" (1.727 m) and weight is 108.9 kg (240 lb). Her temperature is 98 °F (36.7 °C). Her blood pressure is 121/85 and her pulse is 83. Her respiration is 16 and oxygen saturation is 98%.     Chief Complaint: Cough    Chest congestion, ear itchiness, scratchy throat, sinus pressure.  States she thinks she has bronchitis.    Cough  This is a new problem. The current episode started in the past 7 days (x 4 days). The problem has been gradually worsening. The cough is Productive of purulent sputum. Associated symptoms include ear pain, nasal congestion, postnasal drip and rhinorrhea. Pertinent negatives include no chills, fever or myalgias.       Constitution: Negative for chills and fever.   HENT:  Positive for ear pain, postnasal drip and sinus pressure.    Respiratory:  Positive for cough.    Musculoskeletal:  Negative for muscle ache.      Objective:     Physical Exam   Constitutional: She is oriented to person, place, and time. She appears well-developed. She is cooperative.  Non-toxic appearance. She does not appear ill. No distress.   HENT:   Head: Normocephalic and atraumatic.   Ears:   Right Ear: Hearing, tympanic membrane, external ear and ear canal normal.   Left Ear: Hearing, tympanic membrane, external ear and ear canal normal.   Nose: Rhinorrhea and congestion present. No mucosal edema or nasal deformity. No epistaxis. Right sinus exhibits no maxillary sinus tenderness and no frontal sinus tenderness. Left sinus exhibits no maxillary sinus tenderness and no frontal sinus tenderness.   Mouth/Throat: Uvula is midline, oropharynx is clear and moist and mucous membranes are normal. No trismus in the jaw. Normal dentition. No uvula swelling. No oropharyngeal exudate, posterior oropharyngeal edema or posterior oropharyngeal erythema.   Eyes: Conjunctivae and lids are normal. No scleral icterus.   Neck: Trachea normal and " phonation normal. Neck supple. No edema present. No erythema present. No neck rigidity present.   Cardiovascular: Normal rate, regular rhythm, normal heart sounds and normal pulses.   Pulmonary/Chest: Effort normal and breath sounds normal. No stridor. No respiratory distress. She has no decreased breath sounds. She has no wheezes. She has no rhonchi. She has no rales.   Abdominal: Normal appearance.   Musculoskeletal: Normal range of motion.         General: No deformity. Normal range of motion.   Neurological: She is alert and oriented to person, place, and time. She exhibits normal muscle tone. Coordination normal.   Skin: Skin is warm, dry, intact, not diaphoretic and not pale.   Psychiatric: Her speech is normal and behavior is normal. Judgment and thought content normal.   Nursing note and vitals reviewed.      Assessment:     1. Upper respiratory tract infection, unspecified type        Plan:       Upper respiratory tract infection, unspecified type    Other orders  -     guaiFENesin (MUCINEX) 600 mg 12 hr tablet; Take 2 tablets (1,200 mg total) by mouth 2 (two) times daily.  Dispense: 30 tablet; Refill: 0  -     promethazine-dextromethorphan (PROMETHAZINE-DM) 6.25-15 mg/5 mL Syrp; Take 5 mLs by mouth every 4 (four) hours as needed.  Dispense: 118 mL; Refill: 0           Using Flonase already   Antihistamines, inhaled steroid, antitussives as needed.  Tylenol and ibuprofen as needed for pain and body aches.  Instructions given for when to return to the clinic or present to the ED.  - To ED for any new or acutely worsening symptoms including but not limited to chest pain, palpitations, shortness of breath, or fever greater than 103° F.  Patient in agreement with plan of care.    - The diagnosis, treatment plan, instructions for follow-up and reevaluation as well as ED precautions were discussed and understanding was verbalized. All questions or concerns have been addressed.  -Follow up with your primary care  provider for continued evaluation and management.

## 2025-02-03 ENCOUNTER — PATIENT MESSAGE (OUTPATIENT)
Dept: FAMILY MEDICINE | Facility: CLINIC | Age: 35
End: 2025-02-03
Payer: COMMERCIAL

## 2025-02-04 RX ORDER — GABAPENTIN 300 MG/1
300 CAPSULE ORAL 2 TIMES DAILY
Qty: 60 CAPSULE | Refills: 1 | Status: SHIPPED | OUTPATIENT
Start: 2025-02-04 | End: 2026-02-04

## 2025-02-11 ENCOUNTER — OFFICE VISIT (OUTPATIENT)
Dept: FAMILY MEDICINE | Facility: CLINIC | Age: 35
End: 2025-02-11
Payer: COMMERCIAL

## 2025-02-11 VITALS
WEIGHT: 236.38 LBS | HEIGHT: 68 IN | OXYGEN SATURATION: 97 % | SYSTOLIC BLOOD PRESSURE: 116 MMHG | BODY MASS INDEX: 35.82 KG/M2 | DIASTOLIC BLOOD PRESSURE: 72 MMHG | HEART RATE: 78 BPM

## 2025-02-11 DIAGNOSIS — G47.00 INSOMNIA, UNSPECIFIED TYPE: ICD-10-CM

## 2025-02-11 DIAGNOSIS — B02.9 HERPES ZOSTER WITHOUT COMPLICATION: ICD-10-CM

## 2025-02-11 DIAGNOSIS — B02.23 POST-HERPETIC POLYNEUROPATHY: ICD-10-CM

## 2025-02-11 DIAGNOSIS — F32.A DEPRESSION, UNSPECIFIED DEPRESSION TYPE: ICD-10-CM

## 2025-02-11 DIAGNOSIS — F41.9 ANXIETY: Primary | ICD-10-CM

## 2025-02-11 RX ORDER — LEVOCETIRIZINE DIHYDROCHLORIDE 5 MG/1
5 TABLET, FILM COATED ORAL NIGHTLY
COMMUNITY

## 2025-02-11 RX ORDER — CLONAZEPAM 0.5 MG/1
0.5 TABLET, ORALLY DISINTEGRATING ORAL 2 TIMES DAILY PRN
COMMUNITY

## 2025-02-11 RX ORDER — VALACYCLOVIR HYDROCHLORIDE 1 G/1
1000 TABLET, FILM COATED ORAL 3 TIMES DAILY
COMMUNITY
Start: 2025-02-03

## 2025-02-11 RX ORDER — ZOLPIDEM TARTRATE 10 MG/1
10 TABLET ORAL NIGHTLY PRN
Qty: 30 TABLET | Refills: 0 | Status: CANCELLED | OUTPATIENT
Start: 2025-02-11

## 2025-02-12 LAB
ALBUMIN SERPL-MCNC: 4.4 G/DL (ref 3.6–5.1)
ALBUMIN/GLOB SERPL: 1.6 (CALC) (ref 1–2.5)
ALP SERPL-CCNC: 73 U/L (ref 31–125)
ALT SERPL-CCNC: 43 U/L (ref 6–29)
APPEARANCE UR: CLEAR
AST SERPL-CCNC: 46 U/L (ref 10–30)
BACTERIA #/AREA URNS HPF: ABNORMAL /HPF
BACTERIA UR CULT: ABNORMAL
BASOPHILS # BLD AUTO: 38 CELLS/UL (ref 0–200)
BASOPHILS NFR BLD AUTO: 0.4 %
BILIRUB SERPL-MCNC: 0.3 MG/DL (ref 0.2–1.2)
BILIRUB UR QL STRIP: NEGATIVE
BUN SERPL-MCNC: 9 MG/DL (ref 7–25)
BUN/CREAT SERPL: ABNORMAL (CALC) (ref 6–22)
CALCIUM SERPL-MCNC: 9.6 MG/DL (ref 8.6–10.2)
CHLORIDE SERPL-SCNC: 102 MMOL/L (ref 98–110)
CHOLEST SERPL-MCNC: 177 MG/DL
CHOLEST/HDLC SERPL: 4.5 (CALC)
CO2 SERPL-SCNC: 25 MMOL/L (ref 20–32)
COLOR UR: ABNORMAL
CREAT SERPL-MCNC: 0.62 MG/DL (ref 0.5–0.97)
EGFR: 121 ML/MIN/1.73M2
EOSINOPHIL # BLD AUTO: 86 CELLS/UL (ref 15–500)
EOSINOPHIL NFR BLD AUTO: 0.9 %
ERYTHROCYTE [DISTWIDTH] IN BLOOD BY AUTOMATED COUNT: 12.5 % (ref 11–15)
GLOBULIN SER CALC-MCNC: 2.7 G/DL (CALC) (ref 1.9–3.7)
GLUCOSE SERPL-MCNC: 81 MG/DL (ref 65–139)
GLUCOSE UR QL STRIP: NEGATIVE
HCT VFR BLD AUTO: 42.5 % (ref 35–45)
HDLC SERPL-MCNC: 39 MG/DL
HGB BLD-MCNC: 14.1 G/DL (ref 11.7–15.5)
HGB UR QL STRIP: NEGATIVE
HYALINE CASTS #/AREA URNS LPF: ABNORMAL /LPF
KETONES UR QL STRIP: ABNORMAL
LDLC SERPL CALC-MCNC: 105 MG/DL (CALC)
LEUKOCYTE ESTERASE UR QL STRIP: NEGATIVE
LYMPHOCYTES # BLD AUTO: 3552 CELLS/UL (ref 850–3900)
LYMPHOCYTES NFR BLD AUTO: 37 %
MCH RBC QN AUTO: 29.6 PG (ref 27–33)
MCHC RBC AUTO-ENTMCNC: 33.2 G/DL (ref 32–36)
MCV RBC AUTO: 89.1 FL (ref 80–100)
MONOCYTES # BLD AUTO: 624 CELLS/UL (ref 200–950)
MONOCYTES NFR BLD AUTO: 6.5 %
NEUTROPHILS # BLD AUTO: 5299 CELLS/UL (ref 1500–7800)
NEUTROPHILS NFR BLD AUTO: 55.2 %
NITRITE UR QL STRIP: NEGATIVE
NONHDLC SERPL-MCNC: 138 MG/DL (CALC)
PH UR STRIP: ABNORMAL [PH] (ref 5–8)
PLATELET # BLD AUTO: 383 THOUSAND/UL (ref 140–400)
PMV BLD REES-ECKER: 11.2 FL (ref 7.5–12.5)
POTASSIUM SERPL-SCNC: 4.4 MMOL/L (ref 3.5–5.3)
PROT SERPL-MCNC: 7.1 G/DL (ref 6.1–8.1)
PROT UR QL STRIP: NEGATIVE
RBC # BLD AUTO: 4.77 MILLION/UL (ref 3.8–5.1)
RBC #/AREA URNS HPF: ABNORMAL /HPF
SERVICE CMNT-IMP: ABNORMAL
SODIUM SERPL-SCNC: 139 MMOL/L (ref 135–146)
SP GR UR STRIP: 1.02 (ref 1–1.03)
SQUAMOUS #/AREA URNS HPF: ABNORMAL /HPF
TRIGL SERPL-MCNC: 221 MG/DL
TSH SERPL-ACNC: 1.92 MIU/L
WBC # BLD AUTO: 9.6 THOUSAND/UL (ref 3.8–10.8)
WBC #/AREA URNS HPF: ABNORMAL /HPF

## 2025-02-17 ENCOUNTER — TELEPHONE (OUTPATIENT)
Dept: FAMILY MEDICINE | Facility: CLINIC | Age: 35
End: 2025-02-17
Payer: COMMERCIAL

## 2025-02-17 ENCOUNTER — RESULTS FOLLOW-UP (OUTPATIENT)
Dept: FAMILY MEDICINE | Facility: CLINIC | Age: 35
End: 2025-02-17
Payer: COMMERCIAL

## 2025-02-17 NOTE — PROGRESS NOTES
SUBJECTIVE:    Patient ID: Oneida Ingram is a 34 y.o. female.    Chief Complaint: Follow-up (Brought bottles//Pt is here to follow up on shingles-states the rash is starting to go away//KE)    History of Present Illness    CHIEF COMPLAINT:  34 year old female  presents today for follow up of shingles    SHINGLES:  She presents with multiple shingles lesions including a blister with stripe pattern on arm, lesion on inner thigh, and symptomatic lesion on back of neck. She experiences nerve pain and skin crawling sensations at night managed with gabapentin. She is currently on Valtrex but reports poor medication compliance.    NIGHT SWEATS:  She reports severe night sweats for the past 2-3 months, describing episodes that result in soaked clothing.    PSYCHIATRIC:  She is currently under care of a therapist and psychiatrist. Her psychiatrist is attempting to wean her off Lexapro due to concerns about serotonin syndrome. A previous attempt to decrease medication resulted in significant adverse psychological effects. She expresses reluctance about medication reduction due to current regimen's effectiveness.    CURRENT MEDICATIONS:  She takes Wellbutrin in the morning and Lexapro. She uses vaginal estrogen cream for recurrent vaginal tear.      ROS:  General: -fever, -chills, -fatigue, -weight gain, -weight loss, +night sweats  Eyes: -vision changes, -redness, -discharge  ENT: -ear pain, -nasal congestion, -sore throat  Cardiovascular: -chest pain, -palpitations, -lower extremity edema  Respiratory: -cough, -shortness of breath  Gastrointestinal: -abdominal pain, -nausea, -vomiting, -diarrhea, -constipation, -blood in stool  Genitourinary: -dysuria, -hematuria, -frequency  Musculoskeletal: -joint pain, -muscle pain  Skin: -rash, +lesion  Neurological: -headache, -dizziness, -numbness, -tingling  Psychiatric: -anxiety, -depression, -sleep difficulty         Office Visit on 02/11/2025   Component Date Value Ref Range Status     WBC 02/11/2025 9.6  3.8 - 10.8 Thousand/uL Final    RBC 02/11/2025 4.77  3.80 - 5.10 Million/uL Final    Hemoglobin 02/11/2025 14.1  11.7 - 15.5 g/dL Final    Hematocrit 02/11/2025 42.5  35.0 - 45.0 % Final    MCV 02/11/2025 89.1  80.0 - 100.0 fL Final    MCH 02/11/2025 29.6  27.0 - 33.0 pg Final    MCHC 02/11/2025 33.2  32.0 - 36.0 g/dL Final    RDW 02/11/2025 12.5  11.0 - 15.0 % Final    Platelets 02/11/2025 383  140 - 400 Thousand/uL Final    MPV 02/11/2025 11.2  7.5 - 12.5 fL Final    Neutrophils, Abs 02/11/2025 5,299  1,500 - 7,800 cells/uL Final    Lymph # 02/11/2025 3,552  850 - 3,900 cells/uL Final    Mono # 02/11/2025 624  200 - 950 cells/uL Final    Eos # 02/11/2025 86  15 - 500 cells/uL Final    Baso # 02/11/2025 38  0 - 200 cells/uL Final    Neutrophils Relative 02/11/2025 55.2  % Final    Lymph % 02/11/2025 37.0  % Final    Mono % 02/11/2025 6.5  % Final    Eosinophil % 02/11/2025 0.9  % Final    Basophil % 02/11/2025 0.4  % Final    Glucose 02/11/2025 81  65 - 139 mg/dL Final    BUN 02/11/2025 9  7 - 25 mg/dL Final    Creatinine 02/11/2025 0.62  0.50 - 0.97 mg/dL Final    eGFR 02/11/2025 121  > OR = 60 mL/min/1.73m2 Final    BUN/Creatinine Ratio 02/11/2025 SEE NOTE:  6 - 22 (calc) Final    Sodium 02/11/2025 139  135 - 146 mmol/L Final    Potassium 02/11/2025 4.4  3.5 - 5.3 mmol/L Final    Chloride 02/11/2025 102  98 - 110 mmol/L Final    CO2 02/11/2025 25  20 - 32 mmol/L Final    Calcium 02/11/2025 9.6  8.6 - 10.2 mg/dL Final    Total Protein 02/11/2025 7.1  6.1 - 8.1 g/dL Final    Albumin 02/11/2025 4.4  3.6 - 5.1 g/dL Final    Globulin, Total 02/11/2025 2.7  1.9 - 3.7 g/dL (calc) Final    Albumin/Globulin Ratio 02/11/2025 1.6  1.0 - 2.5 (calc) Final    Total Bilirubin 02/11/2025 0.3  0.2 - 1.2 mg/dL Final    Alkaline Phosphatase 02/11/2025 73  31 - 125 U/L Final    AST 02/11/2025 46 (H)  10 - 30 U/L Final    ALT 02/11/2025 43 (H)  6 - 29 U/L Final    TSH w/reflex to FT4 02/11/2025 1.92  mIU/L  Final    Color, UA 2025 DARK YELLOW  YELLOW Final    Appearance, UA 2025 CLEAR  CLEAR Final    Specific Gravity, UA 2025 1.025  1.001 - 1.035 Final    pH, UA 2025 < OR = 5.0  5.0 - 8.0 Final    Glucose, UA 2025 NEGATIVE  NEGATIVE Final    Bilirubin, UA 2025 NEGATIVE  NEGATIVE Final    Ketones, UA 2025 TRACE (A)  NEGATIVE Final    Occult Blood UA 2025 NEGATIVE  NEGATIVE Final    Protein, UA 2025 NEGATIVE  NEGATIVE Final    Nitrite, UA 2025 NEGATIVE  NEGATIVE Final    Leukocytes, UA 2025 NEGATIVE  NEGATIVE Final    WBC Casts, UA 2025 NONE SEEN  < OR = 5 /HPF Final    RBC Casts, UA 2025 NONE SEEN  < OR = 2 /HPF Final    Squam Epithel, UA 2025 0-5  < OR = 5 /HPF Final    Bacteria, UA 2025 NONE SEEN  NONE SEEN /HPF Final    Hyaline Casts, UA 2025 NONE SEEN  NONE SEEN /LPF Final    Service Cmt: 2025 SEE COMMENT   Final    Reflexive Urine Culture 2025 SEE COMMENT   Final    Cholesterol 2025 177  <200 mg/dL Final    HDL 2025 39 (L)  > OR = 50 mg/dL Final    Triglycerides 2025 221 (H)  <150 mg/dL Final    LDL Cholesterol 2025 105 (H)  mg/dL (calc) Final    HDL/Cholesterol Ratio 2025 4.5  <5.0 (calc) Final    Non HDL Chol. (LDL+VLDL) 2025 138 (H)  <130 mg/dL (calc) Final       Past Medical History:   Diagnosis Date    Anxiety     MRSA pneumonia     Pneumonia      Social History[1]  Past Surgical History:   Procedure Laterality Date    APPENDECTOMY      BRONCHOSCOPY       SECTION, LOW TRANSVERSE      DILATION AND CURETTAGE OF UTERUS N/A 2020    Procedure: DILATION AND CURETTAGE, UTERUS;  Surgeon: Srinivasan Stewart MD;  Location: [facility] OR;  Service: OB/GYN;  Laterality: N/A;    HYSTEROSCOPIC POLYPECTOMY OF UTERUS N/A 2020    Procedure: POLYPECTOMY, UTERUS, HYSTEROSCOPIC;  Surgeon: Srinivasan Stewart MD;  Location: [facility];  Service: OB/GYN;  Laterality: N/A;     "HYSTEROSCOPY N/A 8/12/2020    Procedure: HYSTEROSCOPY;  Surgeon: Srinivasan Stewrat MD;  Location: [facility] OR;  Service: OB/GYN;  Laterality: N/A;    TONSILLECTOMY       Family History   Problem Relation Name Age of Onset    COPD Mother      Asthma Mother      Heart disease Mother      Heart disease Brother      Heart disease Maternal Grandfather      Diabetes Paternal Grandmother      Heart disease Paternal Grandmother      Breast cancer Paternal Aunt      Breast cancer Paternal Aunt      Breast cancer Paternal Cousin      Immunodeficiency Neg Hx      Eczema Neg Hx      Rhinitis Neg Hx         All of your core healthy metrics are met.      Review of patient's allergies indicates:   Allergen Reactions    Azithromycin Hives    Bactrim [sulfamethoxazole-trimethoprim]      Rash     Ciprofloxacin      Doesn't remember ? Rash     Clindamycin Hives    Doxycycline Hives    Naltrexone-bupropion Nausea And Vomiting and Other (See Comments)     hands and face numbness    Penicillins Rash     Tiny raised bumps. Not urticarial- 10 years.      Current Medications[2]    Objective:      Vitals:    02/11/25 1216   BP: 116/72   Pulse: 78   SpO2: 97%   Weight: 107.2 kg (236 lb 6.4 oz)   Height: 5' 8" (1.727 m)     Physical Exam    General: No acute distress. Well-developed. Well-nourished.obese  HENT: Normocephalic. Atraumatic. Nares patent. Moist oral mucosa.  Ears: Bilateral TMs clear. Bilateral EACs clear.  Cardiovascular: Regular rate. Regular rhythm. No murmurs.   Respiratory: Normal respiratory effort. Clear to auscultation bilaterally. No rales. No rhonchi. No wheezing.  Extremities: No lower extremity edema.  Neurological: Alert & oriented x3. No slurred speech. Normal gait.  Psychiatric: Normal mood. Normal affect. Good insight. Good judgment.  Skin: Warm. Dry. Areas of shingle are dried. Erythema on the back of the neck.       Assessment:       Assessment & Plan    - Assessed shingles outbreak, noting multiple affected areas " including arm and inner thigh  - Evaluated concern about serotonin syndrome, deemed low risk due to current dosages  - Reviewed vaginal tear and agreed with previous doctor's recommendation for estrogen cream treatment    SHINGLES:  - Examined the shingles marks on the patient's arm and inner thigh, confirming the inner thigh outbreak to be more characteristic of shingles.  - Noted multiple shingles outbreaks on the patient's arm, inner thigh, and possibly neck, with the arm outbreak being the most severe, presenting a blister and stripe-like pattern.  - Explained that shingles attacks nerve dermatomes and typically affects one side of the body.  - Advised the patient to continue taking valacyclovir  for shingles treatment.  - Instructed the patient to report any changes in symptoms or new outbreaks.    NERVE PAIN:  - Recommend continuing gabapentin at night for the skin crawling sensation, indicative of ongoing nerve pain.  - Noted the patient's report of skin crawling sensation at night, possibly related to nerve pain from shingles.  - Recommend continuing gabapentin at night for nerve pain management.  - Advised the patient to monitor and report any changes in the frequency or intensity of the skin crawling sensation.    DEPRESSION:  - Refilled Lexapro and recommended maintaining the current dose, despite another provider's suggestion to wean off.  - Refilled bupropion (Wellbutrin).  - Discussed potential side effects of the current medication regimen with the patient.  - Noted that the patient is seeing a therapist for ongoing mental health support.  - Acknowledged the patient's expressed difficulty in dealing with children, indicating ongoing emotional challenges.  - Advised the patient to continue regular therapy sessions and to report any significant changes in mood or behavior.    TENSION HEADACHES:  - Refilled frovatriptan for tension headaches.  - Instructed the patient on proper usage and dosage of  frovatriptan.  - Advised the patient to maintain a headache diary to track frequency and severity of tension headaches.    OTHER INSTRUCTIONS:  - Discussed night sweats as a potential side effect of medication combination.  - Blood work ordered to rule out thyroid issues and other potential causes of night sweats.    FOLLOW UP:  - Follow up in 3 months.       Plan:       Anxiety  Comments:  klonopin  Orders:  -     CBC Auto Differential; Future; Expected date: 02/11/2025  -     Comprehensive Metabolic Panel; Future; Expected date: 02/11/2025  -     TSH w/reflex to FT4; Future; Expected date: 02/11/2025  -     Urinalysis, Reflex to Urine Culture Urine, Clean Catch; Future; Expected date: 02/11/2025  -     Lipid Panel; Future; Expected date: 02/11/2025    Depression, unspecified depression type  Comments:  lexapro. followed by psych  Orders:  -     CBC Auto Differential; Future; Expected date: 02/11/2025  -     Comprehensive Metabolic Panel; Future; Expected date: 02/11/2025  -     TSH w/reflex to FT4; Future; Expected date: 02/11/2025  -     Urinalysis, Reflex to Urine Culture Urine, Clean Catch; Future; Expected date: 02/11/2025  -     Lipid Panel; Future; Expected date: 02/11/2025    Insomnia, unspecified type  Comments:  ambien    Herpes zoster without complication  Comments:  valtrex    Post-herpetic polyneuropathy  Comments:  gabapentin      Follow up in about 3 months (around 5/11/2025), or if symptoms worsen or fail to improve, for medication management.        This note was generated with the assistance of ambient listening technology. Verbal consent was obtained by the patient and accompanying visitor(s) for the recording of patient appointment to facilitate this note. I attest to having reviewed and edited the generated note for accuracy, though some syntax or spelling errors may persist. Please contact the author of this note for any clarification.      2/17/2025 Peace Gray           [1]   Social  History  Socioeconomic History    Marital status:    Tobacco Use    Smoking status: Never    Smokeless tobacco: Never   Substance and Sexual Activity    Alcohol use: Yes     Comment: socially    Drug use: No    Sexual activity: Yes     Partners: Male     Birth control/protection: None   Social History Narrative    Plans from [facility]         GYN Visit & History 10 [facility]11/7/2019     Obesity    PCOS        Visit Summary:    Down 5 pounds    UDS/ reviewed    Reviewed labs with patient    Discussed diet and exercise        Prescriptions:    SIG: metformin 500 mg oral tablet, 30 days, Dispense #120 Tablet, 5 Refills    Directions: take once daily for 2 weeks then increase to BID for 2 weeks, then increase to 2 po BID    SIG: phentermine 37.5 mg oral tablet, 30 days, Dispense #30 Tablet, 0 Refills    Directions: Take 1 oral tablet once a day        Plan:    Return for follow up appointment in 1 month        Nancy Saravia NP     [facility]     Obesity    Irregular menses    hx of ovarian cysts        Visit Summary:    PCOS panel 1 week prior to return appointment    UDS/ reviewed    Discussed diet and exercise at length        Prescriptions:    SIG: phentermine 37.5 mg oral tablet, 30 days, Dispense #30 Tablet, 0 Refills    Directions: Take 1 oral tablet once a day    SIG: Prenatal 28 mg iron- 800 mcg oral tablet, 30 days, Dispense #30 Tablet, 0 Refills    Directions: Take 1 oral tablet once a day        Plan:    Return for follow up appointment in1 month    Weight loss goal set for 8-10 pounds        Nancy Saravia NP     GYN Exam (Annual/6Wk PP)10 [facility]     Annual    Depression/Anxiety    Obesity    Break-through bleeding with OCP        Visit Summary    Ordered:    Blood Drawing    Pap IG, Ct-Ng, rfx HPV ASCU    CBC With Differential/Platelet    Glucose, Serum    Lipid Panel    UA w/o Micro: within normal limits    UPT: negative        Prescriptions:    SIG:  NuvaRing 0.12-0.015 mg/24 hr ring,  days, Dispense #1 Ring, 3 Refills    Directions: one ring vaginally x 21 days, then remove and discard. Leave out for 7 days, then insert new ring.    Zoloft working for depression management.    Pt has a PCP for depression management, discussed counseling, exercise etc.        Return for follow up appointment in one year or prn.     GYN Visit & [id number] [facility]2016     [facility] k pneumonia. w same sensitivies as k pneumonia uti in .........gb us nl...renal us nl....co persisatnt pp...and frequency and urgency and nocgturia.....modeerat...x 1 y        a-relapsing k npuemonia uti,,..diarrhea x 1 mo            p-cipro 500 mg bid x 10 d...rtc 3 w for repaeat ucs and if still [prese nt will refer to dr mccallum......stool for ova and parasite and c deficil     GYN Visit & Ukrkntl10 [facility]2016     27 yo........daily cramping in pelvis ...moderate....intermittendt and daily....duration 1 h....not worse w activity...u/s nl...on ocp.....associated w dysmen...requires advil and improves w nsaia....no dysp...no dysuria....frequency and urgency..x 1 yr, not worsening, but w apin associated with right cvat....h/o kleb uti ......sedc rate 14 on ....bm 2-4x/d, x 2 mos....sp cs...appy....ho mrsa pneumonia w intubation and blood transfusion 2 yrs....        a-pelvic p[ain, dysmenorrhea, urinary frequency and urgency....poss renal etiology...r/o gb and renal dis        p-renal and gb us...ucs.....rtc 1 wk.....consider dx lap     GYN Visit & History2016     Pelvic pain: intermittent    Contraceptive counseling    urinary tract infection        Plan: Finish Keflex.    Pt did not take doxy as prescribed.    Recommended to take the full 10 days.    Reviewed u/s and labs with patient.    Track pain, continue OCP.    Return for follow up appointment in 2 months for follow up with Dr Horan or sooner if needed.    Terazol esent if needed for yeast r/t prolonged antibiotic use.      GYN Visit & History2016     27 yo........pp x 2mos.....ocp 2-3 wk....cramping w sharp exacerbagtion...right great than left...no n/v...post coital pelvic pain....fever x 1 d...pos appy 10 yr ago        a-pelvic pain....vag dc....        p-gc chl affirm ucs cbc sed rate...doxy 100 bid x 10...us....rtc 1 w     GYN Exam (Annual/6Wk PP)     Annual    Obesity        Plan: Pap with gc/ct    Adipex refilled    Zyrtec refilled.    Diet and exercise discussed.    Return for follow up appointment one year or prn.     GYN Visit & F/     Endometritis, resolved.  Obesity.  Family history of breast cancer.    Bilateral breast ultrasound at [facility].    Prescription for Contrave.    Return to clinic in one month.     GYN Exam (Annual/6Wk PP)     Annual exam.  Contraceptive management.  Strong family history of breast cancer.  Right breast cyst.  Endometritis.    CBC and sed.  Rate today.    Change birth control to Mircette 1 by mouth daily.    Doxycycline and Flagyl x2 weeks.    Ultrasound with radiology of bilateral breasts.    BRCA gene testing.     GYN Visit & F/     Irregular menstrual cycle        Plan: Beta today    If starts menses, restart OCP.    Condom use.    Return for follow up appointment prn pending results.     GYN Visit & F/     Yeast vulvovaginitis    Recent MRSA pneumonia, S/P 3 week ICU stay        Plan: Diflucan 150mg daily #1    Mycolog ointment esent    Continue PT and follow up with PCP as scheduled    Return for follow up appointment prn.     GYN Visit & F/     Bacterial Vaginosis        Plan: Flagyl 500mg I PO BID, pt states she does have rx at home    Repeat diflucan.    Pt has no insurance at this time.        Return for follow up appointment prn.     GYN Visit & [id number]     co uri...on tricycle....        o- heent adenopathy.....cx clean        a-sp luz maria 1 ..uri        p-pap...amp 500 tid.....rtc 6 mo p/p....     GYN  Visit & F/U5/7/2013     cx bx hpv w/o dysplasia.....no tob...rtc 6 mos pap...Gardasil at hd     DAY GYN Visit + History4/22/2013     colpo-prob luz maria 2.....bx at 600/900.........rtc 1 wk     Social Drivers of Health     Financial Resource Strain: Low Risk  (6/24/2024)    Received from [facility]    Overall Financial Resource Strain (CARDIA)     Difficulty of Paying Living Expenses: Not very hard   Food Insecurity: No Food Insecurity (6/24/2024)    Received from [facility]    Hunger Vital Sign     Worried About Running Out of Food in the Last Year: Never true     Ran Out of Food in the Last Year: Never true   Transportation Needs: No Transportation Needs (6/24/2024)    Received from [facility]    PRAPARE - Transportation     Lack of Transportation (Medical): No     Lack of Transportation (Non-Medical): No   Physical Activity: Insufficiently Active (6/24/2024)    Received from [facility]    Exercise Vital Sign     Days of Exercise per Week: 1 day     Minutes of Exercise per Session: 10 min   Stress: Stress Concern Present (6/24/2024)    Received from [facility]    [employer] [place] [employer] - Occupational Stress Questionnaire     Feeling of Stress : Rather much   Housing Stability: Low Risk  (3/26/2024)    Housing Stability Vital Sign     Unable to Pay for Housing in the Last Year: No     Number of Places Lived in the Last Year: 1     Unstable Housing in the Last Year: No   [2]   Current Outpatient Medications:     clonazePAM (KLONOPIN) 0.5 MG disintegrating tablet, Take 0.5 mg by mouth 2 (two) times daily as needed., Disp: , Rfl:     EScitalopram oxalate (LEXAPRO) 10 MG tablet, Take 1 tablet (10 mg total) by mouth once daily., Disp: 90 tablet, Rfl: 3    gabapentin (NEURONTIN) 300 MG capsule, Take 1 capsule (300 mg total) by  mouth 2 (two) times daily., Disp: 60 capsule, Rfl: 1    levocetirizine (XYZAL) 5 MG tablet, Take 5 mg by mouth every evening., Disp: , Rfl:     metoprolol succinate (TOPROL-XL) 25 MG 24 hr tablet, Take 0.5 tablets (12.5 mg total) by mouth once daily., Disp: 45 tablet, Rfl: 5    norethindrone-ethinyl estradiol (JUNEL FE 1/20) 1 mg-20 mcg (21)/75 mg (7) per tablet, Take 1 tablet by mouth., Disp: , Rfl:     pantoprazole (PROTONIX) 40 MG tablet, Take 1 tablet (40 mg total) by mouth once daily., Disp: 90 tablet, Rfl: 3    valACYclovir (VALTREX) 1000 MG tablet, Take 1,000 mg by mouth 3 (three) times daily., Disp: , Rfl:     zolpidem (AMBIEN) 10 mg Tab, Take 1 tablet (10 mg total) by mouth nightly as needed., Disp: 30 tablet, Rfl: 0    benzocaine-menthoL 15-3.6 mg Lozg, 1 each by Mucous Membrane route every 4 (four) hours as needed. (Patient not taking: Reported on 2/11/2025), Disp: 18 lozenge, Rfl: 0    busPIRone (BUSPAR) 10 MG tablet, Take 1 tablet by mouth 3 times daily as needed. (Patient not taking: Reported on 2/11/2025), Disp: , Rfl:     dextromethorphan-bupropion (AUVELITY)  mg TbIE, Take 1 each by mouth. (Patient not taking: Reported on 2/11/2025), Disp: , Rfl:     guaiFENesin (MUCINEX) 600 mg 12 hr tablet, Take 2 tablets (1,200 mg total) by mouth 2 (two) times daily. (Patient not taking: Reported on 2/11/2025), Disp: 30 tablet, Rfl: 0    vitamin D (VITAMIN D3) 1000 units Tab, Take 1,000 Units by mouth once daily. (Patient not taking: Reported on 2/11/2025), Disp: , Rfl:

## 2025-02-17 NOTE — TELEPHONE ENCOUNTER
2/17/25  1:54 AM  Result Note  Cholesterol has improved. Liver enzymes remain elevated but stable. Thyroid levels are normal. You are not anemic

## 2025-02-27 DIAGNOSIS — G47.00 INSOMNIA, UNSPECIFIED TYPE: Primary | ICD-10-CM

## 2025-02-27 RX ORDER — ZOLPIDEM TARTRATE 10 MG/1
10 TABLET ORAL NIGHTLY PRN
Qty: 30 TABLET | Refills: 0 | Status: SHIPPED | OUTPATIENT
Start: 2025-02-27

## 2025-04-07 ENCOUNTER — OFFICE VISIT (OUTPATIENT)
Dept: URGENT CARE | Facility: CLINIC | Age: 35
End: 2025-04-07
Payer: COMMERCIAL

## 2025-04-07 VITALS
DIASTOLIC BLOOD PRESSURE: 85 MMHG | WEIGHT: 236 LBS | SYSTOLIC BLOOD PRESSURE: 123 MMHG | HEIGHT: 68 IN | TEMPERATURE: 98 F | RESPIRATION RATE: 20 BRPM | BODY MASS INDEX: 35.77 KG/M2 | OXYGEN SATURATION: 96 % | HEART RATE: 100 BPM

## 2025-04-07 DIAGNOSIS — B96.89 BACTERIAL UPPER RESPIRATORY INFECTION: Primary | ICD-10-CM

## 2025-04-07 DIAGNOSIS — J06.9 BACTERIAL UPPER RESPIRATORY INFECTION: Primary | ICD-10-CM

## 2025-04-07 DIAGNOSIS — J02.9 SORE THROAT: ICD-10-CM

## 2025-04-07 LAB
CTP QC/QA: YES
S PYO RRNA THROAT QL PROBE: NEGATIVE

## 2025-04-07 PROCEDURE — 96372 THER/PROPH/DIAG INJ SC/IM: CPT | Mod: S$GLB,,, | Performed by: NURSE PRACTITIONER

## 2025-04-07 PROCEDURE — 87880 STREP A ASSAY W/OPTIC: CPT | Mod: QW,,, | Performed by: NURSE PRACTITIONER

## 2025-04-07 PROCEDURE — 99213 OFFICE O/P EST LOW 20 MIN: CPT | Mod: 25,S$GLB,, | Performed by: NURSE PRACTITIONER

## 2025-04-07 RX ORDER — DEXAMETHASONE SODIUM PHOSPHATE 4 MG/ML
8 INJECTION, SOLUTION INTRA-ARTICULAR; INTRALESIONAL; INTRAMUSCULAR; INTRAVENOUS; SOFT TISSUE
Status: COMPLETED | OUTPATIENT
Start: 2025-04-07 | End: 2025-04-07

## 2025-04-07 RX ORDER — DEXAMETHASONE SODIUM PHOSPHATE 4 MG/ML
8 INJECTION, SOLUTION INTRA-ARTICULAR; INTRALESIONAL; INTRAMUSCULAR; INTRAVENOUS; SOFT TISSUE
Status: DISCONTINUED | OUTPATIENT
Start: 2025-04-07 | End: 2025-04-07

## 2025-04-07 RX ORDER — AZITHROMYCIN 250 MG/1
TABLET, FILM COATED ORAL
Qty: 6 TABLET | Refills: 0 | Status: SHIPPED | OUTPATIENT
Start: 2025-04-07 | End: 2025-04-12

## 2025-04-07 RX ADMIN — DEXAMETHASONE SODIUM PHOSPHATE 8 MG: 4 INJECTION, SOLUTION INTRA-ARTICULAR; INTRALESIONAL; INTRAMUSCULAR; INTRAVENOUS; SOFT TISSUE at 10:04

## 2025-04-07 NOTE — PROGRESS NOTES
"Subjective:      Patient ID: Oneida Ingram is a 35 y.o. female.    Vitals:  height is 5' 8" (1.727 m) and weight is 107 kg (236 lb). Her temperature is 98.4 °F (36.9 °C). Her blood pressure is 123/85 and her pulse is 100. Her respiration is 20 and oxygen saturation is 96%.     Chief Complaint: Sore Throat and Otalgia    Patient is a 34 yo female who presents to clinic today for evaluation of sore throat and ear pain that began Saturday. Pt has been taking xyzal and using nasocort for the past few days.     Sore Throat   This is a new problem. The current episode started in the past 7 days. Associated symptoms include coughing and ear pain. Pertinent negatives include no abdominal pain, congestion, diarrhea, headaches, shortness of breath or vomiting.       Constitution: Negative for chills, sweating, fatigue and fever.   HENT:  Positive for ear pain and sore throat. Negative for congestion.    Neck: neck negative.   Cardiovascular: Negative.  Negative for chest pain and palpitations.   Eyes: Negative.    Respiratory:  Positive for cough. Negative for chest tightness and shortness of breath.    Gastrointestinal: Negative.  Negative for abdominal pain, nausea, vomiting and diarrhea.   Endocrine: negative.   Genitourinary: Negative.    Musculoskeletal: Negative.  Negative for muscle ache.   Skin: Negative.  Negative for color change, pale, rash and erythema.   Allergic/Immunologic: Negative.    Neurological: Negative.  Negative for dizziness, light-headedness, passing out, headaches, disorientation and altered mental status.   Hematologic/Lymphatic: Negative.    Psychiatric/Behavioral: Negative.  Negative for altered mental status, disorientation and confusion.       Objective:     Physical Exam   Constitutional: She is oriented to person, place, and time. She appears well-developed. She is cooperative.  Non-toxic appearance. She does not appear ill. No distress.   HENT:   Head: Normocephalic and atraumatic.   Ears: "   Right Ear: Hearing, tympanic membrane, external ear and ear canal normal.   Left Ear: Hearing, tympanic membrane, external ear and ear canal normal.   Nose: Nose normal. No mucosal edema, rhinorrhea, nasal deformity or congestion. No epistaxis. Right sinus exhibits no maxillary sinus tenderness and no frontal sinus tenderness. Left sinus exhibits no maxillary sinus tenderness and no frontal sinus tenderness.   Mouth/Throat: Uvula is midline, oropharynx is clear and moist and mucous membranes are normal. Mucous membranes are moist. No trismus in the jaw. Normal dentition. No uvula swelling. No oropharyngeal exudate or posterior oropharyngeal erythema. Oropharynx is clear.   Eyes: Conjunctivae and lids are normal. Pupils are equal, round, and reactive to light. Right eye exhibits no discharge. Left eye exhibits no discharge. No scleral icterus.   Neck: Trachea normal and phonation normal. Neck supple. No neck rigidity present.   Cardiovascular: Normal rate, regular rhythm, normal heart sounds and normal pulses.   Pulmonary/Chest: Effort normal and breath sounds normal. No respiratory distress. She has no wheezes. She has no rhonchi. She has no rales.   Abdominal: Normal appearance and bowel sounds are normal. She exhibits no distension. Soft. There is no abdominal tenderness.   Musculoskeletal: Normal range of motion.         General: Normal range of motion.      Cervical back: She exhibits no tenderness.   Lymphadenopathy:     She has no cervical adenopathy.   Neurological: She is alert and oriented to person, place, and time. She exhibits normal muscle tone.   Skin: Skin is warm, dry, intact, not diaphoretic, not pale and no rash. Capillary refill takes less than 2 seconds. No erythema   Psychiatric: Her speech is normal and behavior is normal. Judgment and thought content normal.   Nursing note and vitals reviewed.      Assessment:     1. Bacterial upper respiratory infection    2. Sore throat        Plan:        Bacterial upper respiratory infection    Sore throat  -     POCT rapid strep A    Other orders  -     Discontinue: dexAMETHasone injection 8 mg  -     azithromycin (Z-KINZA) 250 MG tablet; Take 2 tablets by mouth on day 1; Take 1 tablet by mouth on days 2-5  Dispense: 6 tablet; Refill: 0  -     dexAMETHasone injection 8 mg                  Labs: Pt refused all POCT   Take medications as prescribed.  Tylenol/Motrin per package instructions for any pain or fever.  Assure adequate hydration and rest.  Throat lozenges or Chloraseptic per package instructions for sore throat.    Warm salt water gargles every 2-3 hours as needed for sore throat.    Nasal saline flushes or irrigation as directed for nasal saline congestion and sinus related symptoms.  Follow-up with PCP in 1-2 days.  Return to clinic as needed.  To ED for any new or acutely worsening symptoms.  Patient in agreement with plan of care.

## 2025-05-19 DIAGNOSIS — G47.00 INSOMNIA, UNSPECIFIED TYPE: ICD-10-CM

## 2025-05-19 RX ORDER — ZOLPIDEM TARTRATE 10 MG/1
10 TABLET ORAL NIGHTLY PRN
Qty: 30 TABLET | Refills: 0 | Status: SHIPPED | OUTPATIENT
Start: 2025-05-19
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
## 2025-06-01 ENCOUNTER — PATIENT MESSAGE (OUTPATIENT)
Dept: FAMILY MEDICINE | Facility: CLINIC | Age: 35
End: 2025-06-01
Payer: COMMERCIAL

## 2025-06-09 ENCOUNTER — OFFICE VISIT (OUTPATIENT)
Dept: FAMILY MEDICINE | Facility: CLINIC | Age: 35
End: 2025-06-09
Payer: COMMERCIAL

## 2025-06-09 VITALS
DIASTOLIC BLOOD PRESSURE: 86 MMHG | WEIGHT: 220 LBS | BODY MASS INDEX: 33.34 KG/M2 | SYSTOLIC BLOOD PRESSURE: 130 MMHG | HEART RATE: 80 BPM | HEIGHT: 68 IN | OXYGEN SATURATION: 97 %

## 2025-06-09 DIAGNOSIS — D64.9 ANEMIA, UNSPECIFIED TYPE: ICD-10-CM

## 2025-06-09 DIAGNOSIS — G44.029 CHRONIC CLUSTER HEADACHE, NOT INTRACTABLE: ICD-10-CM

## 2025-06-09 DIAGNOSIS — R61 HYPERHIDROSIS: ICD-10-CM

## 2025-06-09 DIAGNOSIS — F32.A DEPRESSION, UNSPECIFIED DEPRESSION TYPE: Primary | ICD-10-CM

## 2025-06-09 DIAGNOSIS — Z79.899 HIGH RISK MEDICATION USE: ICD-10-CM

## 2025-06-09 DIAGNOSIS — G47.00 INSOMNIA, UNSPECIFIED TYPE: ICD-10-CM

## 2025-06-09 DIAGNOSIS — E55.9 VITAMIN D DEFICIENCY: ICD-10-CM

## 2025-06-09 PROCEDURE — 3008F BODY MASS INDEX DOCD: CPT | Mod: CPTII,S$GLB,, | Performed by: NURSE PRACTITIONER

## 2025-06-09 PROCEDURE — 3075F SYST BP GE 130 - 139MM HG: CPT | Mod: CPTII,S$GLB,, | Performed by: NURSE PRACTITIONER

## 2025-06-09 PROCEDURE — 3079F DIAST BP 80-89 MM HG: CPT | Mod: CPTII,S$GLB,, | Performed by: NURSE PRACTITIONER

## 2025-06-09 PROCEDURE — 3044F HG A1C LEVEL LT 7.0%: CPT | Mod: CPTII,S$GLB,, | Performed by: NURSE PRACTITIONER

## 2025-06-09 PROCEDURE — 1159F MED LIST DOCD IN RCRD: CPT | Mod: CPTII,S$GLB,, | Performed by: NURSE PRACTITIONER

## 2025-06-09 PROCEDURE — 1160F RVW MEDS BY RX/DR IN RCRD: CPT | Mod: CPTII,S$GLB,, | Performed by: NURSE PRACTITIONER

## 2025-06-09 PROCEDURE — 99214 OFFICE O/P EST MOD 30 MIN: CPT | Mod: S$GLB,,, | Performed by: NURSE PRACTITIONER

## 2025-06-09 RX ORDER — PRAZOSIN HYDROCHLORIDE 1 MG/1
1 CAPSULE ORAL
COMMUNITY
Start: 2025-04-30 | End: 2026-04-30

## 2025-06-09 RX ORDER — BUTALBITAL, ACETAMINOPHEN AND CAFFEINE 50; 325; 40 MG/1; MG/1; MG/1
1 TABLET ORAL EVERY 6 HOURS PRN
Qty: 20 TABLET | Refills: 0 | Status: SHIPPED | OUTPATIENT
Start: 2025-06-09 | End: 2025-07-09

## 2025-06-11 ENCOUNTER — PATIENT MESSAGE (OUTPATIENT)
Dept: FAMILY MEDICINE | Facility: CLINIC | Age: 35
End: 2025-06-11
Payer: COMMERCIAL

## 2025-06-14 LAB
25(OH)D3+25(OH)D2 SERPL-MCNC: 53 NG/ML (ref 30–100)
ALBUMIN SERPL-MCNC: 4 G/DL (ref 3.6–5.1)
ALBUMIN/GLOB SERPL: 1.5 (CALC) (ref 1–2.5)
ALP SERPL-CCNC: 69 U/L (ref 31–125)
ALT SERPL-CCNC: 24 U/L (ref 6–29)
AST SERPL-CCNC: 15 U/L (ref 10–30)
BASOPHILS # BLD AUTO: 41 CELLS/UL (ref 0–200)
BASOPHILS NFR BLD AUTO: 0.5 %
BILIRUB SERPL-MCNC: 0.5 MG/DL (ref 0.2–1.2)
BUN SERPL-MCNC: 11 MG/DL (ref 7–25)
BUN/CREAT SERPL: NORMAL (CALC) (ref 6–22)
CALCIUM SERPL-MCNC: 9.1 MG/DL (ref 8.6–10.2)
CHLORIDE SERPL-SCNC: 106 MMOL/L (ref 98–110)
CO2 SERPL-SCNC: 22 MMOL/L (ref 20–32)
CREAT SERPL-MCNC: 0.58 MG/DL (ref 0.5–0.97)
EGFR: 122 ML/MIN/1.73M2
EOSINOPHIL # BLD AUTO: 74 CELLS/UL (ref 15–500)
EOSINOPHIL NFR BLD AUTO: 0.9 %
ERYTHROCYTE [DISTWIDTH] IN BLOOD BY AUTOMATED COUNT: 12.3 % (ref 11–15)
FERRITIN SERPL-MCNC: 94 NG/ML (ref 16–154)
GLOBULIN SER CALC-MCNC: 2.6 G/DL (CALC) (ref 1.9–3.7)
GLUCOSE SERPL-MCNC: 90 MG/DL (ref 65–99)
HBA1C MFR BLD: 5.5 %
HCT VFR BLD AUTO: 41.4 % (ref 35–45)
HGB BLD-MCNC: 13.4 G/DL (ref 11.7–15.5)
LYMPHOCYTES # BLD AUTO: 2706 CELLS/UL (ref 850–3900)
LYMPHOCYTES NFR BLD AUTO: 33 %
MAGNESIUM SERPL-MCNC: 2.1 MG/DL (ref 1.5–2.5)
MCH RBC QN AUTO: 29.1 PG (ref 27–33)
MCHC RBC AUTO-ENTMCNC: 32.4 G/DL (ref 32–36)
MCV RBC AUTO: 90 FL (ref 80–100)
MONOCYTES # BLD AUTO: 410 CELLS/UL (ref 200–950)
MONOCYTES NFR BLD AUTO: 5 %
NEUTROPHILS # BLD AUTO: 4969 CELLS/UL (ref 1500–7800)
NEUTROPHILS NFR BLD AUTO: 60.6 %
PLATELET # BLD AUTO: 340 THOUSAND/UL (ref 140–400)
PMV BLD REES-ECKER: 11.7 FL (ref 7.5–12.5)
POTASSIUM SERPL-SCNC: 4.1 MMOL/L (ref 3.5–5.3)
PROT SERPL-MCNC: 6.6 G/DL (ref 6.1–8.1)
RBC # BLD AUTO: 4.6 MILLION/UL (ref 3.8–5.1)
SODIUM SERPL-SCNC: 138 MMOL/L (ref 135–146)
TSH SERPL-ACNC: 1.12 MIU/L
WBC # BLD AUTO: 8.2 THOUSAND/UL (ref 3.8–10.8)

## 2025-06-19 RX ORDER — PANTOPRAZOLE SODIUM 40 MG/1
40 TABLET, DELAYED RELEASE ORAL DAILY
Qty: 90 TABLET | Refills: 3 | Status: SHIPPED | OUTPATIENT
Start: 2025-06-19 | End: 2026-06-19

## 2025-06-21 LAB
GAMMA INTERFERON BACKGROUND BLD IA-ACNC: 0.01 IU/ML
M TB IFN-G BLD-IMP: NEGATIVE
M TB IFN-G CD4+ BCKGRND COR BLD-ACNC: 0.01 IU/ML
M TB IFN-G CD4+CD8+ BCKGRND COR BLD-ACNC: 0.01 IU/ML
MITOGEN IGNF BCKGRD COR BLD-ACNC: >10 IU/ML

## 2025-06-24 ENCOUNTER — RESULTS FOLLOW-UP (OUTPATIENT)
Dept: FAMILY MEDICINE | Facility: CLINIC | Age: 35
End: 2025-06-24

## 2025-06-24 NOTE — PROGRESS NOTES
SUBJECTIVE:    Patient ID: Oneida Ingram is a 35 y.o. female.    Chief Complaint: Night Sweats (Bottles brought//Pt is here for night sweats that have been going on for quite a while-has not tried taking anything for it//KE)      History of Present Illness    CHIEF COMPLAINT:  35 year old female presents today for follow up     NIGHT SWEATS:  She experiences severe night sweats occurring 4-5 nights per week, characterized by profuse sweating particularly on legs, despite maintaining a cold sleeping environment with air conditioning and fan use. She denies daytime sweating episodes.    ANXIETY:  She reports ongoing, uncontrolled anxiety that has persisted throughout her life. She uses Buspar as needed for anxiety management instead of Klonopin due to driving requirements, though notes Buspar is less effective.    MEDICATIONS:  She currently takes Abilify, Miniprice, Protonix, birth control, metoprolol, and Doszall. She uses Buspar as needed and occasional Ambien for severe insomnia. She discontinued Lexapro after an attempted increase to twice daily dosing resulted in increased anxiety, returning to once nightly dosing.    MENSTRUAL HISTORY:  She missed her period this month while on birth control. Her OB/GYN reassured that occasionally missing a period while on birth control is normal. A pregnancy test last week was negative.    RECENT MEDICAL HISTORY:  She reports a self-limited cold a couple weeks ago with sniffles and cough that has since resolved.      ROS:  General: -fever, -chills, -fatigue, -weight gain, -weight loss, +night sweats  Eyes: -vision changes, -redness, -discharge  ENT: -ear pain, -nasal congestion, -sore throat  Cardiovascular: -chest pain, -palpitations, -lower extremity edema  Respiratory: +cough, -shortness of breath  Gastrointestinal: -abdominal pain, -nausea, -vomiting, -diarrhea, -constipation, -blood in stool  Genitourinary: -dysuria, -hematuria, -frequency  Musculoskeletal: -joint pain,  -muscle pain  Skin: -rash, -lesion  Neurological: -headache, -dizziness, -numbness, -tingling  Psychiatric: +anxiety, -depression, -sleep difficulty, +nightmares  Female Genitourinary: +absent or irregular periods              Office Visit on 06/09/2025   Component Date Value Ref Range Status    WBC 06/13/2025 8.2  3.8 - 10.8 Thousand/uL Final    RBC 06/13/2025 4.60  3.80 - 5.10 Million/uL Final    Hemoglobin 06/13/2025 13.4  11.7 - 15.5 g/dL Final    Hematocrit 06/13/2025 41.4  35.0 - 45.0 % Final    MCV 06/13/2025 90.0  80.0 - 100.0 fL Final    MCH 06/13/2025 29.1  27.0 - 33.0 pg Final    MCHC 06/13/2025 32.4  32.0 - 36.0 g/dL Final    RDW 06/13/2025 12.3  11.0 - 15.0 % Final    Platelets 06/13/2025 340  140 - 400 Thousand/uL Final    MPV 06/13/2025 11.7  7.5 - 12.5 fL Final    Neutrophils, Abs 06/13/2025 4,969  1,500 - 7,800 cells/uL Final    Lymph # 06/13/2025 2,706  850 - 3,900 cells/uL Final    Mono # 06/13/2025 410  200 - 950 cells/uL Final    Eos # 06/13/2025 74  15 - 500 cells/uL Final    Baso # 06/13/2025 41  0 - 200 cells/uL Final    Neutrophils Relative 06/13/2025 60.6  % Final    Lymph % 06/13/2025 33.0  % Final    Mono % 06/13/2025 5.0  % Final    Eosinophil % 06/13/2025 0.9  % Final    Basophil % 06/13/2025 0.5  % Final    Glucose 06/13/2025 90  65 - 99 mg/dL Final    BUN 06/13/2025 11  7 - 25 mg/dL Final    Creatinine 06/13/2025 0.58  0.50 - 0.97 mg/dL Final    eGFR 06/13/2025 122  > OR = 60 mL/min/1.73m2 Final    BUN/Creatinine Ratio 06/13/2025 SEE NOTE:  6 - 22 (calc) Final    Sodium 06/13/2025 138  135 - 146 mmol/L Final    Potassium 06/13/2025 4.1  3.5 - 5.3 mmol/L Final    Chloride 06/13/2025 106  98 - 110 mmol/L Final    CO2 06/13/2025 22  20 - 32 mmol/L Final    Calcium 06/13/2025 9.1  8.6 - 10.2 mg/dL Final    Total Protein 06/13/2025 6.6  6.1 - 8.1 g/dL Final    Albumin 06/13/2025 4.0  3.6 - 5.1 g/dL Final    Globulin, Total 06/13/2025 2.6  1.9 - 3.7 g/dL (calc) Final    Albumin/Globulin  Ratio 06/13/2025 1.5  1.0 - 2.5 (calc) Final    Total Bilirubin 06/13/2025 0.5  0.2 - 1.2 mg/dL Final    Alkaline Phosphatase 06/13/2025 69  31 - 125 U/L Final    AST 06/13/2025 15  10 - 30 U/L Final    ALT 06/13/2025 24  6 - 29 U/L Final    TSH w/reflex to FT4 06/13/2025 1.12  mIU/L Final    Ferritin 06/13/2025 94  16 - 154 ng/mL Final    Vitamin D, 25-OH, Total 06/13/2025 53  30 - 100 ng/mL Final    Magnesium 06/13/2025 2.1  1.5 - 2.5 mg/dL Final    Hemoglobin A1C 06/13/2025 5.5  <5.7 % Final    QuantiFERON-TB Gold Plus 06/18/2025 NEGATIVE  NEGATIVE Final    NIL 06/18/2025 0.01  IU/mL Final    Mitogen - Nil 06/18/2025 >10.00  IU/mL Final    TB1 - Nil 06/18/2025 0.01  IU/mL Final    TB2 - Nil 06/18/2025 0.01  IU/mL Final   Office Visit on 04/07/2025   Component Date Value Ref Range Status    Rapid Strep A Screen 04/07/2025 Negative  Negative Final     Acceptable 04/07/2025 Yes   Final   Office Visit on 02/11/2025   Component Date Value Ref Range Status    WBC 02/11/2025 9.6  3.8 - 10.8 Thousand/uL Final    RBC 02/11/2025 4.77  3.80 - 5.10 Million/uL Final    Hemoglobin 02/11/2025 14.1  11.7 - 15.5 g/dL Final    Hematocrit 02/11/2025 42.5  35.0 - 45.0 % Final    MCV 02/11/2025 89.1  80.0 - 100.0 fL Final    MCH 02/11/2025 29.6  27.0 - 33.0 pg Final    MCHC 02/11/2025 33.2  32.0 - 36.0 g/dL Final    RDW 02/11/2025 12.5  11.0 - 15.0 % Final    Platelets 02/11/2025 383  140 - 400 Thousand/uL Final    MPV 02/11/2025 11.2  7.5 - 12.5 fL Final    Neutrophils, Abs 02/11/2025 5,299  1,500 - 7,800 cells/uL Final    Lymph # 02/11/2025 3,552  850 - 3,900 cells/uL Final    Mono # 02/11/2025 624  200 - 950 cells/uL Final    Eos # 02/11/2025 86  15 - 500 cells/uL Final    Baso # 02/11/2025 38  0 - 200 cells/uL Final    Neutrophils Relative 02/11/2025 55.2  % Final    Lymph % 02/11/2025 37.0  % Final    Mono % 02/11/2025 6.5  % Final    Eosinophil % 02/11/2025 0.9  % Final    Basophil % 02/11/2025 0.4  % Final     Glucose 02/11/2025 81  65 - 139 mg/dL Final    BUN 02/11/2025 9  7 - 25 mg/dL Final    Creatinine 02/11/2025 0.62  0.50 - 0.97 mg/dL Final    eGFR 02/11/2025 121  > OR = 60 mL/min/1.73m2 Final    BUN/Creatinine Ratio 02/11/2025 SEE NOTE:  6 - 22 (calc) Final    Sodium 02/11/2025 139  135 - 146 mmol/L Final    Potassium 02/11/2025 4.4  3.5 - 5.3 mmol/L Final    Chloride 02/11/2025 102  98 - 110 mmol/L Final    CO2 02/11/2025 25  20 - 32 mmol/L Final    Calcium 02/11/2025 9.6  8.6 - 10.2 mg/dL Final    Total Protein 02/11/2025 7.1  6.1 - 8.1 g/dL Final    Albumin 02/11/2025 4.4  3.6 - 5.1 g/dL Final    Globulin, Total 02/11/2025 2.7  1.9 - 3.7 g/dL (calc) Final    Albumin/Globulin Ratio 02/11/2025 1.6  1.0 - 2.5 (calc) Final    Total Bilirubin 02/11/2025 0.3  0.2 - 1.2 mg/dL Final    Alkaline Phosphatase 02/11/2025 73  31 - 125 U/L Final    AST 02/11/2025 46 (H)  10 - 30 U/L Final    ALT 02/11/2025 43 (H)  6 - 29 U/L Final    TSH w/reflex to FT4 02/11/2025 1.92  mIU/L Final    Color, UA 02/11/2025 DARK YELLOW  YELLOW Final    Appearance, UA 02/11/2025 CLEAR  CLEAR Final    Specific Gravity, UA 02/11/2025 1.025  1.001 - 1.035 Final    pH, UA 02/11/2025 < OR = 5.0  5.0 - 8.0 Final    Glucose, UA 02/11/2025 NEGATIVE  NEGATIVE Final    Bilirubin, UA 02/11/2025 NEGATIVE  NEGATIVE Final    Ketones, UA 02/11/2025 TRACE (A)  NEGATIVE Final    Occult Blood UA 02/11/2025 NEGATIVE  NEGATIVE Final    Protein, UA 02/11/2025 NEGATIVE  NEGATIVE Final    Nitrite, UA 02/11/2025 NEGATIVE  NEGATIVE Final    Leukocytes, UA 02/11/2025 NEGATIVE  NEGATIVE Final    WBC Casts, UA 02/11/2025 NONE SEEN  < OR = 5 /HPF Final    RBC Casts, UA 02/11/2025 NONE SEEN  < OR = 2 /HPF Final    Squam Epithel, UA 02/11/2025 0-5  < OR = 5 /HPF Final    Bacteria, UA 02/11/2025 NONE SEEN  NONE SEEN /HPF Final    Hyaline Casts, UA 02/11/2025 NONE SEEN  NONE SEEN /LPF Final    Service Cmt: 02/11/2025 SEE COMMENT   Final    Reflexive Urine Culture 02/11/2025 SEE  COMMENT   Final    Cholesterol 2025 177  <200 mg/dL Final    HDL 2025 39 (L)  > OR = 50 mg/dL Final    Triglycerides 2025 221 (H)  <150 mg/dL Final    LDL Cholesterol 2025 105 (H)  mg/dL (calc) Final    HDL/Cholesterol Ratio 2025 4.5  <5.0 (calc) Final    Non HDL Chol. (LDL+VLDL) 2025 138 (H)  <130 mg/dL (calc) Final       Past Medical History:   Diagnosis Date    Anxiety     MRSA pneumonia     Pneumonia      Past Surgical History:   Procedure Laterality Date    APPENDECTOMY      BRONCHOSCOPY       SECTION, LOW TRANSVERSE      DILATION AND CURETTAGE OF UTERUS N/A 2020    Procedure: DILATION AND CURETTAGE, UTERUS;  Surgeon: Srinivasan Stewart MD;  Location: [facility] OR;  Service: OB/GYN;  Laterality: N/A;    HYSTEROSCOPIC POLYPECTOMY OF UTERUS N/A 2020    Procedure: POLYPECTOMY, UTERUS, HYSTEROSCOPIC;  Surgeon: Srinivasan Stewart MD;  Location: [facility] OR;  Service: OB/GYN;  Laterality: N/A;    HYSTEROSCOPY N/A 2020    Procedure: HYSTEROSCOPY;  Surgeon: Srinivasan Stewart MD;  Location: [facility] OR;  Service: OB/GYN;  Laterality: N/A;    TONSILLECTOMY       Family History   Problem Relation Name Age of Onset    COPD Mother      Asthma Mother      Heart disease Mother      Heart disease Brother      Heart disease Maternal Grandfather      Diabetes Paternal Grandmother      Heart disease Paternal Grandmother      Breast cancer Paternal Aunt      Breast cancer Paternal Aunt      Breast cancer Paternal Cousin      Immunodeficiency Neg Hx      Eczema Neg Hx      Rhinitis Neg Hx         All of your core healthy metrics are met.      The 10-year CVD risk score (ANSELMO'Agoino, et al., 2008) is: 2.1%    Values used to calculate the score:      Age: 35 years      Sex: Female      Diabetic: No      Tobacco smoker: No      Systolic Blood Pressure: 130 mmHg      Is BP treated: No      HDL Cholesterol: 39 mg/dL      Total Cholesterol: 177 mg/dL     Marital Status:   Alcohol  "History:  reports current alcohol use.  Tobacco History:  reports that she has never smoked. She has never used smokeless tobacco.  Drug History:  reports no history of drug use.    Health Maintenance Topics with due status: Not Due       Topic Last Completion Date    Influenza Vaccine 11/14/2019    Cervical Cancer Screening 06/12/2023    RSV Vaccine (Age 60+ and Pregnant patients) Not Due     Immunization History   Administered Date(s) Administered    COVID-19, MRNA, LN-S, PF (Pfizer) (Purple Cap) 07/31/2021, 08/21/2021    Influenza (FLUBLOK) - Quadrivalent - Recombinant - PF *Preferred* (egg allergy) 11/14/2019    Pneumococcal Conjugate - 20 Valent 05/31/2023    Pneumococcal Polysaccharide - 23 Valent 07/13/2017       Review of patient's allergies indicates:   Allergen Reactions    Azithromycin Hives    Bactrim [sulfamethoxazole-trimethoprim]      Rash     Ciprofloxacin      Doesn't remember ? Rash     Clindamycin Hives    Doxycycline Hives    Naltrexone-bupropion Nausea And Vomiting and Other (See Comments)     hands and face numbness    Penicillins Rash     Tiny raised bumps. Not urticarial- 10 years.      Current Medications[1]        Objective:      Vitals:    06/09/25 1150   BP: 130/86   Pulse: 80   SpO2: 97%   Weight: 99.8 kg (220 lb)   Height: 5' 8" (1.727 m)       Physical Exam  Vitals and nursing note reviewed.   Constitutional:       General: She is not in acute distress.     Appearance: Normal appearance. She is well-developed. She is obese.   HENT:      Head: Normocephalic.      Right Ear: External ear normal.      Left Ear: External ear normal.   Eyes:      Conjunctiva/sclera: Conjunctivae normal.   Neck:      Vascular: No JVD.   Cardiovascular:      Rate and Rhythm: Normal rate and regular rhythm.      Heart sounds: No murmur heard.  Pulmonary:      Effort: Pulmonary effort is normal.      Breath sounds: Normal breath sounds.   Abdominal:      General: Bowel sounds are normal.      Palpations: " Abdomen is soft.   Musculoskeletal:         General: No deformity. Normal range of motion.      Cervical back: Normal range of motion and neck supple.   Lymphadenopathy:      Cervical: No cervical adenopathy.   Skin:     General: Skin is warm and dry.      Findings: No rash.   Neurological:      Mental Status: She is alert and oriented to person, place, and time.      Gait: Gait normal.   Psychiatric:         Speech: Speech normal.         Behavior: Behavior normal.          Assessment:       1. Depression, unspecified depression type    2. Insomnia, unspecified type    3. High risk medication use    4. Hyperhidrosis    5. Vitamin D deficiency    6. Anemia, unspecified type    7. Chronic cluster headache, not intractable           Assessment & Plan    - Considered night sweats as potential side effect of current medications, particularly antidepressants.  - Ruled out perimenopausal symptoms due to young age and history of PCOS.  - Plan comprehensive labs to investigate potential underlying causes before adjusting medications.  - Considered tuberculosis as differential diagnosis due to night sweats, though deemed unlikely.    NIGHT SWEATS (OTHER APOCRINE SWEAT DISORDERS):  - Oneida experiences night sweats 4-5 nights a week, described as being drenched in sweat despite air conditioning set to 62-63 degrees and a fan blowing.  - These persist despite medication changes and may be related to PTSD nightmares or medication side effects.  - Ordered bloodwork to check vitamin levels and hemoglobin A1C to rule out other causes.    GENERALIZED ANXIETY DISORDER:  - Oneida reports anxiety is never fully under control, even with medication, though it has been worse before.  - Anxiety persists despite current medication regimen.  - Continued Buspar as needed for anxiety management, with Klonopin also part of the treatment plan.    MENSTRUAL IRREGULARITIES:  - Oneida missed period this month.  - Pregnancy test taken last week was  negative.  - Discussed with OB/GYN who confirmed it's normal to occasionally miss a period while on birth control.  - Continued birth control regimen as prescribed.    TUBERCULOSIS SCREENING:  - Ordered TB Gold blood test to rule out tuberculosis due to the patient's cough and public exposure, despite low suspicion of tuberculosis.    FOLLOW-UP:  - Ordered complete labs panel including vitamin levels, hemoglobin A1C, and thyroid function tests.  - Follow up for labs at a later date due to scheduling conflict with dentist appointment.        Plan:       1. Depression, unspecified depression type  Comments:  continue current meds for now  Orders:  -     CBC Auto Differential; Future; Expected date: 06/09/2025  -     Comprehensive Metabolic Panel; Future; Expected date: 06/09/2025  -     TSH w/reflex to FT4; Future; Expected date: 06/09/2025  -     Ferritin; Future; Expected date: 06/09/2025  -     Vitamin D; Future; Expected date: 06/09/2025  -     Magnesium; Future; Expected date: 06/09/2025  -     Hemoglobin A1C; Future; Expected date: 06/09/2025    2. Insomnia, unspecified type  Comments:  ambien  Orders:  -     CBC Auto Differential; Future; Expected date: 06/09/2025  -     Comprehensive Metabolic Panel; Future; Expected date: 06/09/2025  -     TSH w/reflex to FT4; Future; Expected date: 06/09/2025  -     Ferritin; Future; Expected date: 06/09/2025  -     Vitamin D; Future; Expected date: 06/09/2025  -     Magnesium; Future; Expected date: 06/09/2025  -     Hemoglobin A1C; Future; Expected date: 06/09/2025    3. High risk medication use    4. Hyperhidrosis  Comments:  labs  Orders:  -     CBC Auto Differential; Future; Expected date: 06/09/2025  -     Comprehensive Metabolic Panel; Future; Expected date: 06/09/2025  -     TSH w/reflex to FT4; Future; Expected date: 06/09/2025  -     Ferritin; Future; Expected date: 06/09/2025  -     Vitamin D; Future; Expected date: 06/09/2025  -     Magnesium; Future; Expected  date: 06/09/2025  -     Hemoglobin A1C; Future; Expected date: 06/09/2025  -     QuantiFERON-TB Gold Plus; Future; Expected date: 06/09/2025    5. Vitamin D deficiency  Comments:  labs  Orders:  -     Vitamin D; Future; Expected date: 06/09/2025    6. Anemia, unspecified type  Comments:  labs  Orders:  -     Ferritin; Future; Expected date: 06/09/2025    7. Chronic cluster headache, not intractable  Comments:  esgic  Orders:  -     butalbital-acetaminophen-caffeine -40 mg (FIORICET, ESGIC) -40 mg per tablet; Take 1 tablet by mouth every 6 (six) hours as needed for Pain.  Dispense: 20 tablet; Refill: 0      Follow up in about 3 months (around 9/9/2025), or if symptoms worsen or fail to improve, for medication management.          Counseled on age and gender appropriate medical preventative services, including cancer screenings, immunizations, overall nutritional health, need for a consistent exercise regimen and an overall push towards maintaining a vigorous and active lifestyle.      This note was generated with the assistance of ambient listening technology. Verbal consent was obtained by the patient and accompanying visitor(s) for the recording of patient appointment to facilitate this note. I attest to having reviewed and edited the generated note for accuracy, though some syntax or spelling errors may persist. Please contact the author of this note for any clarification.       6/24/2025 Peace Gray NP         [1]   Current Outpatient Medications:     busPIRone (BUSPAR) 10 MG tablet, Take 1 tablet by mouth 3 times daily as needed., Disp: , Rfl:     dextromethorphan-bupropion (AUVELITY)  mg TbIE, Take 1 each by mouth., Disp: , Rfl:     levocetirizine (XYZAL) 5 MG tablet, Take 5 mg by mouth every evening., Disp: , Rfl:     metoprolol succinate (TOPROL-XL) 25 MG 24 hr tablet, Take 0.5 tablets (12.5 mg total) by mouth once daily., Disp: 45 tablet, Rfl: 5    norethindrone-ethinyl estradiol (JUNEL FE  1/20) 1 mg-20 mcg (21)/75 mg (7) per tablet, Take 1 tablet by mouth., Disp: , Rfl:     prazosin (MINIPRESS) 1 MG Cap, Take 1 mg by mouth., Disp: , Rfl:     zolpidem (AMBIEN) 10 mg Tab, Take 1 tablet (10 mg total) by mouth nightly as needed (sleep)., Disp: 30 tablet, Rfl: 0    butalbital-acetaminophen-caffeine -40 mg (FIORICET, ESGIC) -40 mg per tablet, Take 1 tablet by mouth every 6 (six) hours as needed for Pain., Disp: 20 tablet, Rfl: 0    pantoprazole (PROTONIX) 40 MG tablet, Take 1 tablet (40 mg total) by mouth once daily., Disp: 90 tablet, Rfl: 3    valACYclovir (VALTREX) 1000 MG tablet, Take 1,000 mg by mouth 3 (three) times daily. (Patient not taking: Reported on 6/9/2025), Disp: , Rfl:

## 2025-08-25 RX ORDER — METOPROLOL SUCCINATE 25 MG/1
12.5 TABLET, EXTENDED RELEASE ORAL DAILY
Qty: 45 TABLET | Refills: 5 | Status: SHIPPED | OUTPATIENT
Start: 2025-08-25 | End: 2026-08-25

## (undated) DEVICE — CATH 16FR URETHRL RED RUB

## (undated) DEVICE — CONTAINER SPECIMEN STRL 4OZ

## (undated) DEVICE — NDL 18GA

## (undated) DEVICE — SEE MEDLINE ITEM 152622

## (undated) DEVICE — SEE MEDLINE ITEM 157181

## (undated) DEVICE — GLOVE SURG ULTRA TOUCH 7

## (undated) DEVICE — GAUZE SPONGE XRAY 4X4

## (undated) DEVICE — DEVICE MYOSURE REACH SYS

## (undated) DEVICE — SLEEVE SCD EXPRESS CALF MEDIUM

## (undated) DEVICE — TUBE AQUILEX INFLOW

## (undated) DEVICE — SYR B-D DISP CONTROL 10CC100/C

## (undated) DEVICE — SPONGE NOVAPLUS LAP 18X18IN

## (undated) DEVICE — TRAY DRY SKIN SCRUB PREP

## (undated) DEVICE — TUBE AQUILEX OUTFLOW

## (undated) DEVICE — SYR BULB IRRIG ST 60 LF

## (undated) DEVICE — PACK DRAPE PERI/GYN TIBURON

## (undated) DEVICE — PAD SANITARY OB STERILE

## (undated) DEVICE — SEE MEDLINE ITEM 157116

## (undated) DEVICE — COVER SURG LIGHT HANDLE

## (undated) DEVICE — GLOVE SURGEONS ULTRA TOUCH 6.5

## (undated) DEVICE — LEGGING CLEAR POLY 2/PACK

## (undated) DEVICE — SEAL LENS SCOPE MYOSURE

## (undated) DEVICE — SOL 9P NACL IRR PIC IL

## (undated) DEVICE — SCRUB HIBICLENS 4% CHG 4OZ

## (undated) DEVICE — DRESSING TELFA PAD N ADH 2X3